# Patient Record
Sex: MALE | Race: ASIAN | NOT HISPANIC OR LATINO | URBAN - METROPOLITAN AREA
[De-identification: names, ages, dates, MRNs, and addresses within clinical notes are randomized per-mention and may not be internally consistent; named-entity substitution may affect disease eponyms.]

---

## 2020-10-05 ENCOUNTER — INPATIENT (INPATIENT)
Facility: HOSPITAL | Age: 55
LOS: 2 days | Discharge: HOME | End: 2020-10-08
Attending: HOSPITALIST | Admitting: HOSPITALIST
Payer: MEDICAID

## 2020-10-05 VITALS
HEART RATE: 121 BPM | OXYGEN SATURATION: 98 % | DIASTOLIC BLOOD PRESSURE: 112 MMHG | TEMPERATURE: 98 F | RESPIRATION RATE: 18 BRPM | SYSTOLIC BLOOD PRESSURE: 178 MMHG

## 2020-10-05 DIAGNOSIS — E78.5 HYPERLIPIDEMIA, UNSPECIFIED: ICD-10-CM

## 2020-10-05 DIAGNOSIS — Z95.818 PRESENCE OF OTHER CARDIAC IMPLANTS AND GRAFTS: Chronic | ICD-10-CM

## 2020-10-05 DIAGNOSIS — I10 ESSENTIAL (PRIMARY) HYPERTENSION: ICD-10-CM

## 2020-10-05 DIAGNOSIS — F10.239 ALCOHOL DEPENDENCE WITH WITHDRAWAL, UNSPECIFIED: ICD-10-CM

## 2020-10-05 DIAGNOSIS — F10.230 ALCOHOL DEPENDENCE WITH WITHDRAWAL, UNCOMPLICATED: ICD-10-CM

## 2020-10-05 DIAGNOSIS — Z29.9 ENCOUNTER FOR PROPHYLACTIC MEASURES, UNSPECIFIED: ICD-10-CM

## 2020-10-05 DIAGNOSIS — E10.65 TYPE 1 DIABETES MELLITUS WITH HYPERGLYCEMIA: ICD-10-CM

## 2020-10-05 LAB
ALBUMIN SERPL ELPH-MCNC: 4.7 G/DL — SIGNIFICANT CHANGE UP (ref 3.3–5)
ALP SERPL-CCNC: 62 U/L — SIGNIFICANT CHANGE UP (ref 40–120)
ALT FLD-CCNC: 28 U/L — SIGNIFICANT CHANGE UP (ref 4–41)
ANION GAP SERPL CALC-SCNC: 11 MMO/L — SIGNIFICANT CHANGE UP (ref 7–14)
ANION GAP SERPL CALC-SCNC: 15 MMO/L — HIGH (ref 7–14)
APPEARANCE UR: CLEAR — SIGNIFICANT CHANGE UP
AST SERPL-CCNC: 20 U/L — SIGNIFICANT CHANGE UP (ref 4–40)
B-OH-BUTYR SERPL-SCNC: 1.4 MMOL/L — HIGH (ref 0–0.4)
B-OH-BUTYR SERPL-SCNC: < 0.1 MMOL/L — SIGNIFICANT CHANGE UP (ref 0–0.4)
BACTERIA # UR AUTO: NEGATIVE — SIGNIFICANT CHANGE UP
BASE EXCESS BLDV CALC-SCNC: 2.6 MMOL/L — SIGNIFICANT CHANGE UP
BASOPHILS # BLD AUTO: 0.02 K/UL — SIGNIFICANT CHANGE UP (ref 0–0.2)
BASOPHILS NFR BLD AUTO: 0.2 % — SIGNIFICANT CHANGE UP (ref 0–2)
BILIRUB SERPL-MCNC: 0.5 MG/DL — SIGNIFICANT CHANGE UP (ref 0.2–1.2)
BILIRUB UR-MCNC: NEGATIVE — SIGNIFICANT CHANGE UP
BLOOD GAS VENOUS - CREATININE: 1.04 MG/DL — SIGNIFICANT CHANGE UP (ref 0.5–1.3)
BLOOD UR QL VISUAL: NEGATIVE — SIGNIFICANT CHANGE UP
BUN SERPL-MCNC: 19 MG/DL — SIGNIFICANT CHANGE UP (ref 7–23)
BUN SERPL-MCNC: 22 MG/DL — SIGNIFICANT CHANGE UP (ref 7–23)
CALCIUM SERPL-MCNC: 9 MG/DL — SIGNIFICANT CHANGE UP (ref 8.4–10.5)
CALCIUM SERPL-MCNC: 9.8 MG/DL — SIGNIFICANT CHANGE UP (ref 8.4–10.5)
CHLORIDE BLDV-SCNC: 99 MMOL/L — SIGNIFICANT CHANGE UP (ref 96–108)
CHLORIDE SERPL-SCNC: 92 MMOL/L — LOW (ref 98–107)
CHLORIDE SERPL-SCNC: 97 MMOL/L — LOW (ref 98–107)
CO2 SERPL-SCNC: 25 MMOL/L — SIGNIFICANT CHANGE UP (ref 22–31)
CO2 SERPL-SCNC: 27 MMOL/L — SIGNIFICANT CHANGE UP (ref 22–31)
COLOR SPEC: SIGNIFICANT CHANGE UP
CREAT SERPL-MCNC: 0.94 MG/DL — SIGNIFICANT CHANGE UP (ref 0.5–1.3)
CREAT SERPL-MCNC: 0.98 MG/DL — SIGNIFICANT CHANGE UP (ref 0.5–1.3)
EOSINOPHIL # BLD AUTO: 0.02 K/UL — SIGNIFICANT CHANGE UP (ref 0–0.5)
EOSINOPHIL NFR BLD AUTO: 0.2 % — SIGNIFICANT CHANGE UP (ref 0–6)
ETHANOL BLD-MCNC: < 10 MG/DL — SIGNIFICANT CHANGE UP
GAS PNL BLDV: 127 MMOL/L — LOW (ref 136–146)
GLUCOSE BLDC GLUCOMTR-MCNC: 125 MG/DL — HIGH (ref 70–99)
GLUCOSE BLDC GLUCOMTR-MCNC: 299 MG/DL — HIGH (ref 70–99)
GLUCOSE BLDC GLUCOMTR-MCNC: 317 MG/DL — HIGH (ref 70–99)
GLUCOSE BLDV-MCNC: 439 MG/DL — HIGH (ref 70–99)
GLUCOSE SERPL-MCNC: 369 MG/DL — HIGH (ref 70–99)
GLUCOSE SERPL-MCNC: 457 MG/DL — CRITICAL HIGH (ref 70–99)
GLUCOSE UR-MCNC: >1000 — HIGH
HCO3 BLDV-SCNC: 25 MMOL/L — SIGNIFICANT CHANGE UP (ref 20–27)
HCT VFR BLD CALC: 40.3 % — SIGNIFICANT CHANGE UP (ref 39–50)
HCT VFR BLDV CALC: 46.1 % — SIGNIFICANT CHANGE UP (ref 39–51)
HGB BLD-MCNC: 14.1 G/DL — SIGNIFICANT CHANGE UP (ref 13–17)
HGB BLDV-MCNC: 15.1 G/DL — SIGNIFICANT CHANGE UP (ref 13–17)
HYALINE CASTS # UR AUTO: NEGATIVE — SIGNIFICANT CHANGE UP
IMM GRANULOCYTES NFR BLD AUTO: 0.5 % — SIGNIFICANT CHANGE UP (ref 0–1.5)
KETONES UR-MCNC: HIGH
LACTATE BLDV-MCNC: 2.8 MMOL/L — HIGH (ref 0.5–2)
LEUKOCYTE ESTERASE UR-ACNC: NEGATIVE — SIGNIFICANT CHANGE UP
LYMPHOCYTES # BLD AUTO: 0.83 K/UL — LOW (ref 1–3.3)
LYMPHOCYTES # BLD AUTO: 8.9 % — LOW (ref 13–44)
MAGNESIUM SERPL-MCNC: 1.6 MG/DL — SIGNIFICANT CHANGE UP (ref 1.6–2.6)
MCHC RBC-ENTMCNC: 30.4 PG — SIGNIFICANT CHANGE UP (ref 27–34)
MCHC RBC-ENTMCNC: 35 % — SIGNIFICANT CHANGE UP (ref 32–36)
MCV RBC AUTO: 86.9 FL — SIGNIFICANT CHANGE UP (ref 80–100)
MONOCYTES # BLD AUTO: 0.47 K/UL — SIGNIFICANT CHANGE UP (ref 0–0.9)
MONOCYTES NFR BLD AUTO: 5 % — SIGNIFICANT CHANGE UP (ref 2–14)
NEUTROPHILS # BLD AUTO: 7.93 K/UL — HIGH (ref 1.8–7.4)
NEUTROPHILS NFR BLD AUTO: 85.2 % — HIGH (ref 43–77)
NITRITE UR-MCNC: NEGATIVE — SIGNIFICANT CHANGE UP
NRBC # FLD: 0 K/UL — SIGNIFICANT CHANGE UP (ref 0–0)
PCO2 BLDV: 51 MMHG — SIGNIFICANT CHANGE UP (ref 41–51)
PH BLDV: 7.36 PH — SIGNIFICANT CHANGE UP (ref 7.32–7.43)
PH UR: 6 — SIGNIFICANT CHANGE UP (ref 5–8)
PHOSPHATE SERPL-MCNC: 2.2 MG/DL — LOW (ref 2.5–4.5)
PHOSPHATE SERPL-MCNC: 3.8 MG/DL — SIGNIFICANT CHANGE UP (ref 2.5–4.5)
PLATELET # BLD AUTO: 174 K/UL — SIGNIFICANT CHANGE UP (ref 150–400)
PMV BLD: 8.9 FL — SIGNIFICANT CHANGE UP (ref 7–13)
PO2 BLDV: 32 MMHG — LOW (ref 35–40)
POTASSIUM BLDV-SCNC: 4.6 MMOL/L — HIGH (ref 3.4–4.5)
POTASSIUM SERPL-MCNC: 4.6 MMOL/L — SIGNIFICANT CHANGE UP (ref 3.5–5.3)
POTASSIUM SERPL-MCNC: 5 MMOL/L — SIGNIFICANT CHANGE UP (ref 3.5–5.3)
POTASSIUM SERPL-SCNC: 4.6 MMOL/L — SIGNIFICANT CHANGE UP (ref 3.5–5.3)
POTASSIUM SERPL-SCNC: 5 MMOL/L — SIGNIFICANT CHANGE UP (ref 3.5–5.3)
PROT SERPL-MCNC: 7.5 G/DL — SIGNIFICANT CHANGE UP (ref 6–8.3)
PROT UR-MCNC: 20 — SIGNIFICANT CHANGE UP
RBC # BLD: 4.64 M/UL — SIGNIFICANT CHANGE UP (ref 4.2–5.8)
RBC # FLD: 12 % — SIGNIFICANT CHANGE UP (ref 10.3–14.5)
RBC CASTS # UR COMP ASSIST: SIGNIFICANT CHANGE UP (ref 0–?)
SAO2 % BLDV: 55.4 % — LOW (ref 60–85)
SARS-COV-2 RNA SPEC QL NAA+PROBE: SIGNIFICANT CHANGE UP
SODIUM SERPL-SCNC: 132 MMOL/L — LOW (ref 135–145)
SODIUM SERPL-SCNC: 135 MMOL/L — SIGNIFICANT CHANGE UP (ref 135–145)
SP GR SPEC: 1.03 — SIGNIFICANT CHANGE UP (ref 1–1.04)
SQUAMOUS # UR AUTO: SIGNIFICANT CHANGE UP
TROPONIN T, HIGH SENSITIVITY: 16 NG/L — SIGNIFICANT CHANGE UP (ref ?–14)
UROBILINOGEN FLD QL: NORMAL — SIGNIFICANT CHANGE UP
WBC # BLD: 9.32 K/UL — SIGNIFICANT CHANGE UP (ref 3.8–10.5)
WBC # FLD AUTO: 9.32 K/UL — SIGNIFICANT CHANGE UP (ref 3.8–10.5)
WBC UR QL: SIGNIFICANT CHANGE UP (ref 0–?)

## 2020-10-05 PROCEDURE — 99285 EMERGENCY DEPT VISIT HI MDM: CPT

## 2020-10-05 PROCEDURE — 99223 1ST HOSP IP/OBS HIGH 75: CPT | Mod: GC

## 2020-10-05 PROCEDURE — 71045 X-RAY EXAM CHEST 1 VIEW: CPT | Mod: 26

## 2020-10-05 RX ORDER — ENOXAPARIN SODIUM 100 MG/ML
40 INJECTION SUBCUTANEOUS DAILY
Refills: 0 | Status: DISCONTINUED | OUTPATIENT
Start: 2020-10-05 | End: 2020-10-08

## 2020-10-05 RX ORDER — INSULIN LISPRO 100/ML
8 VIAL (ML) SUBCUTANEOUS ONCE
Refills: 0 | Status: COMPLETED | OUTPATIENT
Start: 2020-10-05 | End: 2020-10-05

## 2020-10-05 RX ORDER — INSULIN LISPRO 100/ML
VIAL (ML) SUBCUTANEOUS
Refills: 0 | Status: DISCONTINUED | OUTPATIENT
Start: 2020-10-05 | End: 2020-10-08

## 2020-10-05 RX ORDER — SODIUM,POTASSIUM PHOSPHATES 278-250MG
1 POWDER IN PACKET (EA) ORAL THREE TIMES A DAY
Refills: 0 | Status: COMPLETED | OUTPATIENT
Start: 2020-10-05 | End: 2020-10-06

## 2020-10-05 RX ORDER — INSULIN GLARGINE 100 [IU]/ML
24 INJECTION, SOLUTION SUBCUTANEOUS AT BEDTIME
Refills: 0 | Status: DISCONTINUED | OUTPATIENT
Start: 2020-10-06 | End: 2020-10-07

## 2020-10-05 RX ORDER — LOSARTAN POTASSIUM 100 MG/1
50 TABLET, FILM COATED ORAL DAILY
Refills: 0 | Status: DISCONTINUED | OUTPATIENT
Start: 2020-10-05 | End: 2020-10-08

## 2020-10-05 RX ORDER — INSULIN GLARGINE 100 [IU]/ML
24 INJECTION, SOLUTION SUBCUTANEOUS ONCE
Refills: 0 | Status: COMPLETED | OUTPATIENT
Start: 2020-10-05 | End: 2020-10-05

## 2020-10-05 RX ORDER — INSULIN LISPRO 100/ML
VIAL (ML) SUBCUTANEOUS AT BEDTIME
Refills: 0 | Status: DISCONTINUED | OUTPATIENT
Start: 2020-10-05 | End: 2020-10-08

## 2020-10-05 RX ORDER — DEXTROSE 50 % IN WATER 50 %
25 SYRINGE (ML) INTRAVENOUS ONCE
Refills: 0 | Status: DISCONTINUED | OUTPATIENT
Start: 2020-10-05 | End: 2020-10-08

## 2020-10-05 RX ORDER — GLUCAGON INJECTION, SOLUTION 0.5 MG/.1ML
1 INJECTION, SOLUTION SUBCUTANEOUS ONCE
Refills: 0 | Status: DISCONTINUED | OUTPATIENT
Start: 2020-10-05 | End: 2020-10-08

## 2020-10-05 RX ORDER — INSULIN GLARGINE 100 [IU]/ML
24 INJECTION, SOLUTION SUBCUTANEOUS AT BEDTIME
Refills: 0 | Status: DISCONTINUED | OUTPATIENT
Start: 2020-10-05 | End: 2020-10-05

## 2020-10-05 RX ORDER — DEXTROSE 50 % IN WATER 50 %
12.5 SYRINGE (ML) INTRAVENOUS ONCE
Refills: 0 | Status: DISCONTINUED | OUTPATIENT
Start: 2020-10-05 | End: 2020-10-08

## 2020-10-05 RX ORDER — INSULIN LISPRO 100/ML
4 VIAL (ML) SUBCUTANEOUS
Refills: 0 | Status: DISCONTINUED | OUTPATIENT
Start: 2020-10-05 | End: 2020-10-06

## 2020-10-05 RX ORDER — DEXTROSE 50 % IN WATER 50 %
15 SYRINGE (ML) INTRAVENOUS ONCE
Refills: 0 | Status: DISCONTINUED | OUTPATIENT
Start: 2020-10-05 | End: 2020-10-08

## 2020-10-05 RX ORDER — SODIUM CHLORIDE 9 MG/ML
2000 INJECTION INTRAMUSCULAR; INTRAVENOUS; SUBCUTANEOUS ONCE
Refills: 0 | Status: COMPLETED | OUTPATIENT
Start: 2020-10-05 | End: 2020-10-05

## 2020-10-05 RX ORDER — SODIUM CHLORIDE 9 MG/ML
1000 INJECTION, SOLUTION INTRAVENOUS
Refills: 0 | Status: DISCONTINUED | OUTPATIENT
Start: 2020-10-05 | End: 2020-10-08

## 2020-10-05 RX ORDER — FOLIC ACID 0.8 MG
1 TABLET ORAL DAILY
Refills: 0 | Status: DISCONTINUED | OUTPATIENT
Start: 2020-10-05 | End: 2020-10-08

## 2020-10-05 RX ORDER — AMLODIPINE BESYLATE 2.5 MG/1
5 TABLET ORAL DAILY
Refills: 0 | Status: DISCONTINUED | OUTPATIENT
Start: 2020-10-05 | End: 2020-10-08

## 2020-10-05 RX ORDER — THIAMINE MONONITRATE (VIT B1) 100 MG
100 TABLET ORAL DAILY
Refills: 0 | Status: COMPLETED | OUTPATIENT
Start: 2020-10-05 | End: 2020-10-08

## 2020-10-05 RX ORDER — THIAMINE MONONITRATE (VIT B1) 100 MG
500 TABLET ORAL ONCE
Refills: 0 | Status: COMPLETED | OUTPATIENT
Start: 2020-10-05 | End: 2020-10-05

## 2020-10-05 RX ORDER — ATORVASTATIN CALCIUM 80 MG/1
80 TABLET, FILM COATED ORAL AT BEDTIME
Refills: 0 | Status: DISCONTINUED | OUTPATIENT
Start: 2020-10-05 | End: 2020-10-08

## 2020-10-05 RX ORDER — INFLUENZA VIRUS VACCINE 15; 15; 15; 15 UG/.5ML; UG/.5ML; UG/.5ML; UG/.5ML
0.5 SUSPENSION INTRAMUSCULAR ONCE
Refills: 0 | Status: COMPLETED | OUTPATIENT
Start: 2020-10-05 | End: 2020-10-08

## 2020-10-05 RX ADMIN — SODIUM CHLORIDE 2000 MILLILITER(S): 9 INJECTION INTRAMUSCULAR; INTRAVENOUS; SUBCUTANEOUS at 13:17

## 2020-10-05 RX ADMIN — LOSARTAN POTASSIUM 50 MILLIGRAM(S): 100 TABLET, FILM COATED ORAL at 18:11

## 2020-10-05 RX ADMIN — Medication 1 TABLET(S): at 16:01

## 2020-10-05 RX ADMIN — Medication 50 MILLIGRAM(S): at 21:37

## 2020-10-05 RX ADMIN — INSULIN GLARGINE 24 UNIT(S): 100 INJECTION, SOLUTION SUBCUTANEOUS at 14:45

## 2020-10-05 RX ADMIN — Medication 1 MILLIGRAM(S): at 16:00

## 2020-10-05 RX ADMIN — Medication 1 PACKET(S): at 21:37

## 2020-10-05 RX ADMIN — Medication 8 UNIT(S): at 13:17

## 2020-10-05 RX ADMIN — Medication 6: at 18:31

## 2020-10-05 RX ADMIN — Medication 105 MILLIGRAM(S): at 14:45

## 2020-10-05 RX ADMIN — Medication 4 UNIT(S): at 18:31

## 2020-10-05 RX ADMIN — ATORVASTATIN CALCIUM 80 MILLIGRAM(S): 80 TABLET, FILM COATED ORAL at 21:37

## 2020-10-05 RX ADMIN — Medication 2 MILLIGRAM(S): at 11:50

## 2020-10-05 RX ADMIN — Medication 50 MILLIGRAM(S): at 16:00

## 2020-10-05 RX ADMIN — AMLODIPINE BESYLATE 5 MILLIGRAM(S): 2.5 TABLET ORAL at 19:36

## 2020-10-05 RX ADMIN — SODIUM CHLORIDE 2000 MILLILITER(S): 9 INJECTION INTRAMUSCULAR; INTRAVENOUS; SUBCUTANEOUS at 11:58

## 2020-10-05 NOTE — ED ADULT NURSE NOTE - OBJECTIVE STATEMENT
56 y/o m presents to the ED with lightheadedness. Patient states has h/o regular etoh use with previous admissions after stopping alcohol at o[facility], also with loop recorder for syncope, IDDM  htn-last etoh drink yesterday. This morning was driving around and began to feel lightheaded, improved significantly after an hour 54 y/o m presents to the ED with lightheadedness. Patient states has h/o regular etoh use with previous admissions after stopping alcohol at osh, also with loop recorder for syncope, IDDM  htn-last etoh drink yesterday. This morning was driving around and began to feel lightheaded, improved significantly after an hour  PIV Placed labs drawn as ordered Pt medicated with ativan see CIWA score

## 2020-10-05 NOTE — H&P ADULT - HISTORY OF PRESENT ILLNESS
Pt is a 56 yo M with a hx of alcohol use, HTN, uncontrolled DM, HLD presenting with lightheadedness and visual disturbances. Pt was hospitalized mid August 2020 at [facility] for alcohol withdrawal c/b seizure. Pt states that he typically drinks 1 shot/day but this weekend drank a bottle of vodka. Last drink last night 7pm. This morning was driving and reports feeling dizzy, having blurry vision and seeing a dark spot in his vision. Also reports mild nausea this am that has resolved. Denies vomiting, h/a, CP, SOB, abd pain, cough.    In ED: afebrile, -120s, -190/100, RR 14-20, % RA.  blood glucose 453,

## 2020-10-05 NOTE — ED PROVIDER NOTE - CLINICAL SUMMARY MEDICAL DECISION MAKING FREE TEXT BOX
Patient presents to the ED with lightheadedness since this am now improved, h/o syncope with loop recorder, dm, htn, etoh use with admissions for withdrawal. Patient tachycardic, hypertensive, +tongue fasciculation, dry mm, elevated fsg. Will check labs, ekg, cxr, hydrate to start treating volume depletion and hyperglycemia, ciwa protocol and ativan prn for likely etoh withdrawal, eval for diff including but not limited to elec disturbances, organ dysfunction, monitor on tele for arrythmia, and reass.

## 2020-10-05 NOTE — H&P ADULT - ASSESSMENT
Pt is a 56 yo M with a hx of alcohol use, HTN, uncontrolled DM, HLD presenting with lightheadedness, visual disturbances and nausea. Pt has hx of hospitalization for alcohol withdrawal c/b seizure in August. Last drink last night 7pm. Pt has tachycardia to 120, HTN to 190/100. Blood glucose 453, beta hydroxybutyrate 1.4.      Pt is a 56 yo M with a hx of alcohol use, HTN, uncontrolled DM, HLD presenting with lightheadedness, visual disturbances and nausea. Pt has hx of hospitalization for alcohol withdrawal c/b seizure in August. Last drink last night 7pm. Pt has tachycardia to 120, HTN to 190/100. Blood glucose 453, beta hydroxybutyrate 1.4. HbA1c 10.7.

## 2020-10-05 NOTE — H&P ADULT - NSHPSOCIALHISTORY_GEN_ALL_CORE
Pt lives at home with 23 year old son and brother in law. Wife recently passed away at [facility] due to COVID. Pt states that he began drinking heavily after her passing in April. He works as a  for a sheet [employer] company.  Pt denies hx of STDs. Denies tobacco use.

## 2020-10-05 NOTE — H&P ADULT - PROBLEM SELECTOR PLAN 1
-Librium taper w Ativan PRN  -s/p high dose thiamine in ED, c/w oral thiamine  -f/u EtOH level  -f/u repeat BMP  -c/w home folic acid 1mg  -multivitamin

## 2020-10-05 NOTE — H&P ADULT - ATTENDING COMMENTS
Briefly a 55M with ETOH abuse with recent hospitalization for ETOH withdrawal in Aug 2020, syncope in past now s/p ILR, HLD, DM2 on Lantus, HTN, presents with dizziness/lightheadedness and tremors concerning for ETOH withdrawal.  Pt also found to have mild DKA with hyperglycemia + urinary ketones and mildly elevated BHB in setting of missed doses of insulin.      #ETOH withdrawal: continue CIWA monitoring (score 0 currently), agree with librium taper with PRN Ativan.  SW/SBIRT evaluation for ETOH counseling programs.  Resume MVI/Thiamine/Folate.  Pt's wife recently passed away from COVID likely exacerbating ETOH use disorder.    Consider EP evaluation for ILR readings if dizziness persists.     #Insulin dependent DM2: missed doses of insulin.  Received Lantus 24U in ER and now on HISS. S/p 2L NS bolus, check repeat BMP now with BHB to ensure anion gap is closing.  Resume basal/bolus regimen

## 2020-10-05 NOTE — ED PROVIDER NOTE - NS ED ROS FT
ROS:  GENERAL: No fever, no chills  EYES: no change in vision  HEENT: no trouble swallowing, no trouble speaking  CARDIAC: no chest pain  PULMONARY: no cough, no shortness of breath  GI: no abdominal pain, no constipation  : No dysuria, no frequency, no change in appearance, or odor of urine  SKIN: no rashes  NEURO: no headache, no weakness, +lightheaded  MSK: No joint pain

## 2020-10-05 NOTE — ED PROVIDER NOTE - PHYSICAL EXAMINATION
GEN - NAD; nontoxic appearing; A+O x3   HEAD - NC/AT   EYES- PERRL, EOMI  ENT: Airway patent, dry mm, +tongue fasciculations, Oral cavity and pharynx normal.   NECK: Neck supple, no masses.  PULMONARY - CTA b/l, symmetric breath sounds.   CARDIAC -s1s2, RRR, no M,G,R  ABDOMEN - +BS, ND, NT, soft, no guarding, no rebound, no masses   BACK - no CVA tenderness, Normal  spine   EXTREMITIES - FROM, no edema   SKIN - no rash or bruising   NEUROLOGIC - ao3, cn2-12 intact, 5/5 strength in all extremities, sensation grossly intact  PSYCH -calm, cooperative

## 2020-10-05 NOTE — ED ADULT TRIAGE NOTE - CHIEF COMPLAINT QUOTE
p/t with ETOH, c/o of feeling dizzy since this am, mild tremors to extremities noted, last alcohol intake yesterday, fs [id number]

## 2020-10-05 NOTE — ED PROVIDER NOTE - OBJECTIVE STATEMENT
54 y/o m presents to the ED with lightheadedness. Patient states states has h/o regular etoh use with previous admissions after stopping alcohol at osh, also with loop recorder for syncope, IDDM (24U Lanus qhs), htn-last etoh drink yesterday. This morning was driving around and began to feel lightheaded, improved significantly after an hour but has not completely resolved, does have mild nausea associated. No ha, new vision changes (has baseline r. visual field blindness), cp, sob, cough, vomiting, abd pain, diarrhea, dysuria. Denies h/o withdrawal seizures. 56 y/o m presents to the ED with lightheadedness. Patient states has h/o regular etoh use with previous admissions after stopping alcohol at osh, also with loop recorder for syncope, IDDM (24U Lanus qhs), htn-last etoh drink yesterday. This morning was driving around and began to feel lightheaded, improved significantly after an hour but has not completely resolved, does have mild nausea associated. No ha, new vision changes (has baseline r. visual field blindness), cp, sob, cough, vomiting, abd pain, diarrhea, dysuria. Denies h/o withdrawal seizures.

## 2020-10-05 NOTE — H&P ADULT - NSHPPHYSICALEXAM_GEN_ALL_CORE
Vital Signs Last 24 Hrs  T(C): 36.8 (05 Oct 2020 15:58), Max: 37 (05 Oct 2020 11:45)  T(F): 98.2 (05 Oct 2020 15:58), Max: 98.6 (05 Oct 2020 11:45)  HR: 112 (05 Oct 2020 15:58) (111 - 121)  BP: 156/92 (05 Oct 2020 15:58) (156/92 - 190/100)  BP(mean): --  RR: 18 (05 Oct 2020 15:58) (14 - 20)  SpO2: 100% (05 Oct 2020 15:58) (98% - 100%)      GENERAL: NAD, lying in bed comfortably  HEAD:  Atraumatic, Normocephalic  EYES: EOMI, PERRLA, conjunctiva and sclera clear  ENT: Moist mucous membranes  NECK: Supple, No JVD  CHEST/LUNG: Clear to auscultation bilaterally; No rales, rhonchi, wheezing, or rubs. Unlabored respirations  HEART: +tachycardic, reg rhythm; No murmurs, rubs, or gallops  ABDOMEN: Bowel sounds present; NTND  EXTREMITIES:  2+ Peripheral Pulses, brisk capillary refill. No clubbing, cyanosis, or edema  NERVOUS SYSTEM:  Alert & Oriented X3, speech clear. No deficits. +1 tremulousness   MSK: FROM all 4 extremities, full and equal strength  SKIN: No rashes or lesions

## 2020-10-06 ENCOUNTER — TRANSCRIPTION ENCOUNTER (OUTPATIENT)
Age: 55
End: 2020-10-06

## 2020-10-06 LAB
ALBUMIN SERPL ELPH-MCNC: 3.9 G/DL — SIGNIFICANT CHANGE UP (ref 3.3–5)
ALP SERPL-CCNC: 49 U/L — SIGNIFICANT CHANGE UP (ref 40–120)
ALT FLD-CCNC: 21 U/L — SIGNIFICANT CHANGE UP (ref 4–41)
ANION GAP SERPL CALC-SCNC: 13 MMO/L — SIGNIFICANT CHANGE UP (ref 7–14)
AST SERPL-CCNC: 18 U/L — SIGNIFICANT CHANGE UP (ref 4–40)
BILIRUB SERPL-MCNC: 0.5 MG/DL — SIGNIFICANT CHANGE UP (ref 0.2–1.2)
BUN SERPL-MCNC: 18 MG/DL — SIGNIFICANT CHANGE UP (ref 7–23)
CALCIUM SERPL-MCNC: 8.7 MG/DL — SIGNIFICANT CHANGE UP (ref 8.4–10.5)
CHLORIDE SERPL-SCNC: 99 MMOL/L — SIGNIFICANT CHANGE UP (ref 98–107)
CO2 SERPL-SCNC: 26 MMOL/L — SIGNIFICANT CHANGE UP (ref 22–31)
CREAT SERPL-MCNC: 0.9 MG/DL — SIGNIFICANT CHANGE UP (ref 0.5–1.3)
CULTURE RESULTS: SIGNIFICANT CHANGE UP
GLUCOSE BLDC GLUCOMTR-MCNC: 112 MG/DL — HIGH (ref 70–99)
GLUCOSE SERPL-MCNC: 98 MG/DL — SIGNIFICANT CHANGE UP (ref 70–99)
HCT VFR BLD CALC: 38.9 % — LOW (ref 39–50)
HGB BLD-MCNC: 12.8 G/DL — LOW (ref 13–17)
MAGNESIUM SERPL-MCNC: 1.7 MG/DL — SIGNIFICANT CHANGE UP (ref 1.6–2.6)
MCHC RBC-ENTMCNC: 29.6 PG — SIGNIFICANT CHANGE UP (ref 27–34)
MCHC RBC-ENTMCNC: 32.9 % — SIGNIFICANT CHANGE UP (ref 32–36)
MCV RBC AUTO: 89.8 FL — SIGNIFICANT CHANGE UP (ref 80–100)
NRBC # FLD: 0 K/UL — SIGNIFICANT CHANGE UP (ref 0–0)
PHOSPHATE SERPL-MCNC: 3.7 MG/DL — SIGNIFICANT CHANGE UP (ref 2.5–4.5)
PLATELET # BLD AUTO: 158 K/UL — SIGNIFICANT CHANGE UP (ref 150–400)
PMV BLD: 9.2 FL — SIGNIFICANT CHANGE UP (ref 7–13)
POTASSIUM SERPL-MCNC: 3.5 MMOL/L — SIGNIFICANT CHANGE UP (ref 3.5–5.3)
POTASSIUM SERPL-SCNC: 3.5 MMOL/L — SIGNIFICANT CHANGE UP (ref 3.5–5.3)
PROT SERPL-MCNC: 6 G/DL — SIGNIFICANT CHANGE UP (ref 6–8.3)
RBC # BLD: 4.33 M/UL — SIGNIFICANT CHANGE UP (ref 4.2–5.8)
RBC # FLD: 12 % — SIGNIFICANT CHANGE UP (ref 10.3–14.5)
SODIUM SERPL-SCNC: 138 MMOL/L — SIGNIFICANT CHANGE UP (ref 135–145)
SPECIMEN SOURCE: SIGNIFICANT CHANGE UP
WBC # BLD: 4.76 K/UL — SIGNIFICANT CHANGE UP (ref 3.8–10.5)
WBC # FLD AUTO: 4.76 K/UL — SIGNIFICANT CHANGE UP (ref 3.8–10.5)

## 2020-10-06 PROCEDURE — 99232 SBSQ HOSP IP/OBS MODERATE 35: CPT | Mod: GC

## 2020-10-06 RX ORDER — INSULIN LISPRO 100/ML
8 VIAL (ML) SUBCUTANEOUS
Refills: 0 | Status: DISCONTINUED | OUTPATIENT
Start: 2020-10-06 | End: 2020-10-07

## 2020-10-06 RX ADMIN — ENOXAPARIN SODIUM 40 MILLIGRAM(S): 100 INJECTION SUBCUTANEOUS at 11:46

## 2020-10-06 RX ADMIN — Medication 1 PACKET(S): at 05:35

## 2020-10-06 RX ADMIN — Medication 1 PACKET(S): at 14:51

## 2020-10-06 RX ADMIN — AMLODIPINE BESYLATE 5 MILLIGRAM(S): 2.5 TABLET ORAL at 05:35

## 2020-10-06 RX ADMIN — Medication 4 UNIT(S): at 17:35

## 2020-10-06 RX ADMIN — Medication 100 MILLIGRAM(S): at 11:46

## 2020-10-06 RX ADMIN — Medication 6: at 17:35

## 2020-10-06 RX ADMIN — INSULIN GLARGINE 24 UNIT(S): 100 INJECTION, SOLUTION SUBCUTANEOUS at 21:50

## 2020-10-06 RX ADMIN — Medication 50 MILLIGRAM(S): at 03:48

## 2020-10-06 RX ADMIN — Medication 4 UNIT(S): at 11:48

## 2020-10-06 RX ADMIN — Medication 4 UNIT(S): at 08:50

## 2020-10-06 RX ADMIN — Medication 4: at 21:50

## 2020-10-06 RX ADMIN — Medication 1 TABLET(S): at 11:46

## 2020-10-06 RX ADMIN — LOSARTAN POTASSIUM 50 MILLIGRAM(S): 100 TABLET, FILM COATED ORAL at 05:35

## 2020-10-06 RX ADMIN — ATORVASTATIN CALCIUM 80 MILLIGRAM(S): 80 TABLET, FILM COATED ORAL at 21:50

## 2020-10-06 RX ADMIN — Medication 6: at 11:48

## 2020-10-06 RX ADMIN — Medication 1 MILLIGRAM(S): at 11:46

## 2020-10-06 RX ADMIN — Medication 50 MILLIGRAM(S): at 14:51

## 2020-10-06 NOTE — PROGRESS NOTE ADULT - SUBJECTIVE AND OBJECTIVE BOX
RATNA SOSASH  55y  Male      Patient is a 55y old Male who presents with a chief complaint of Lightheadedness (05 Oct 2020 16:37)      INTERVAL HPI/OVERNIGHT EVENTS: Pt seen at bedside. HEBER overnight. Pt states that he feels "much better than yesterday".  Reports some dizziness on standing when he gets up to use the bathroom. Denies blurry vision, tremulousness, insomnia, anxiety. Reports good appetite, good sleep.       REVIEW OF SYSTEMS:  CONSTITUTIONAL: No fever, weight loss, or fatigue  EYES: No eye pain, visual disturbances, or discharge  ENMT:  No difficulty hearing, tinnitus, vertigo; No sinus or throat pain  NECK: No pain or stiffness  BREASTS: No pain, masses, or nipple discharge  RESPIRATORY: No cough, wheezing, chills or hemoptysis; No shortness of breath  CARDIOVASCULAR: No chest pain, palpitations, dizziness, or leg swelling  GASTROINTESTINAL: No abdominal or epigastric pain. No nausea, vomiting, or hematemesis; No diarrhea or constipation. No melena or hematochezia.  GENITOURINARY: No dysuria, frequency, hematuria, or incontinence  NEUROLOGICAL: +headaches, +dizziness on standing, memory loss, loss of strength, numbness, or tremors  SKIN: No itching, burning, rashes, or lesions   LYMPH NODES: No enlarged glands  ENDOCRINE: No heat or cold intolerance; No hair loss  MUSCULOSKELETAL: No joint pain or swelling; No muscle, back, or extremity pain  PSYCHIATRIC: No depression, anxiety, mood swings, or difficulty sleeping  HEME/LYMPH: No easy bruising, or bleeding gums  ALLERY AND IMMUNOLOGIC: No hives or eczema  FAMILY HISTORY:    T(C): 36.4 (10-06-20 @ 05:34), Max: 37 (10-05-20 @ 11:45)  HR: 69 (10-06-20 @ 05:34) (69 - 121)  BP: 128/88 (10-06-20 @ 05:34) (116/83 - 190/100)  RR: 17 (10-06-20 @ 05:34) (14 - 20)  SpO2: 100% (10-06-20 @ 05:34) (98% - 100%)      PHYSICAL EXAM:  GENERAL: NAD, well-groomed, well-developed  HEAD:  Atraumatic, Normocephalic  EYES: EOMI, PERRLA, conjunctiva and sclera clear  ENMT: No tonsillar erythema, exudates, or enlargement; Moist mucous membranes, Good dentition, No lesions  NECK: Supple, No JVD, Normal thyroid  NERVOUS SYSTEM:  Alert & Oriented X3, Good concentration; Motor Strength 5/5 B/L upper and lower extremities; DTRs 2+ intact and symmetric  CHEST/LUNG: Clear to percussion bilaterally; No rales, rhonchi, wheezing, or rubs  HEART: Regular rate and rhythm; No murmurs, rubs, or gallops  ABDOMEN: Soft, Nontender, Nondistended; Bowel sounds present  EXTREMITIES:  2+ Peripheral Pulses, No clubbing, cyanosis, or edema  LYMPH: No lymphadenopathy noted  SKIN: No rashes or lesions    Consultant(s) Notes Reviewed:  [x ] YES  [ ] NO  Care Discussed with Consultants/Other Providers [ x] YES  [ ] NO    LABS:                        12.8   4.76  )-----------( 158      ( 06 Oct 2020 05:40 )             38.9     10-    138  |  99  |  18  ----------------------------<  98  3.5   |  26  |  0.90    Ca    8.7      06 Oct 2020 05:40  Phos  3.7     10-06  Mg     1.7     10-06    TPro  6.0  /  Alb  3.9  /  TBili  0.5  /  DBili  x   /  AST  18  /  ALT  21  /  AlkPhos  49  10-06      Urinalysis Basic - ( 05 Oct 2020 13:00 )    Color: LIGHT YELLOW / Appearance: CLEAR / S.031 / pH: 6.0  Gluc: >1000 / Ketone: MODERATE  / Bili: NEGATIVE / Urobili: NORMAL   Blood: NEGATIVE / Protein: 20 / Nitrite: NEGATIVE   Leuk Esterase: NEGATIVE / RBC: 0-2 / WBC 0-2   Sq Epi: OCC / Non Sq Epi: x / Bacteria: NEGATIVE      55y  Male    RADIOLOGY & ADDITIONAL TESTS:    Imaging Personally Reviewed:  [ ] YES  [ ] NO  amLODIPine   Tablet 5 milliGRAM(s) Oral daily  atorvastatin 80 milliGRAM(s) Oral at bedtime  chlordiazePOXIDE   Oral   chlordiazePOXIDE 50 milliGRAM(s) Oral every 8 hours  dextrose 40% Gel 15 Gram(s) Oral once PRN  dextrose 5%. 1000 milliLiter(s) IV Continuous <Continuous>  dextrose 50% Injectable 12.5 Gram(s) IV Push once  dextrose 50% Injectable 25 Gram(s) IV Push once  dextrose 50% Injectable 25 Gram(s) IV Push once  enoxaparin Injectable 40 milliGRAM(s) SubCutaneous daily  folic acid 1 milliGRAM(s) Oral daily  glucagon  Injectable 1 milliGRAM(s) IntraMuscular once PRN  influenza   Vaccine 0.5 milliLiter(s) IntraMuscular once  insulin glargine Injectable (LANTUS) 24 Unit(s) SubCutaneous at bedtime  insulin lispro (HumaLOG) corrective regimen sliding scale   SubCutaneous three times a day before meals  insulin lispro (HumaLOG) corrective regimen sliding scale   SubCutaneous at bedtime  insulin lispro Injectable (HumaLOG) 4 Unit(s) SubCutaneous three times a day before meals  LORazepam   Injectable 2 milliGRAM(s) IV Push every 2 hours PRN  LORazepam   Injectable 2 milliGRAM(s) IV Push every 1 hour PRN  losartan 50 milliGRAM(s) Oral daily  multivitamin 1 Tablet(s) Oral daily  potassium phosphate / sodium phosphate Powder (PHOS-NaK) 1 Packet(s) Oral three times a day  thiamine 100 milliGRAM(s) Oral daily

## 2020-10-06 NOTE — DISCHARGE NOTE PROVIDER - NSDCCPCAREPLAN_GEN_ALL_CORE_FT
PRINCIPAL DISCHARGE DIAGNOSIS  Diagnosis: Alcohol withdrawal  Assessment and Plan of Treatment:       SECONDARY DISCHARGE DIAGNOSES  Diagnosis: Essential hypertension  Assessment and Plan of Treatment: Essential hypertension    Diagnosis: Hyperglycemia due to type 1 diabetes mellitus  Assessment and Plan of Treatment: Hyperglycemia due to type 1 diabetes mellitus     PRINCIPAL DISCHARGE DIAGNOSIS  Diagnosis: Alcohol withdrawal  Assessment and Plan of Treatment: You came into the hospital with dizziness, blurry vision, shakey hands, unsteadiness walking and nausea.  These symptoms are due to alcohol withdrawal. In the hospital you were treated with librium, a medication that can treat the symptoms of alcohol withdrawal. You were kept in the hospital to monitor you as alcohol withdrawal can also cause seizures.      SECONDARY DISCHARGE DIAGNOSES  Diagnosis: Hyperglycemia due to type 1 diabetes mellitus  Assessment and Plan of Treatment: When you came to the hospital, your blood sugar was high, to 453. Your hemoglobin A1c was also high, 10.7, which shows that your blood sugar is not being controlled well. The goal hemoglobin A1c is less than 7. It is important to take your insulin as prescribed, check your blood sugar regularly and eat healthily.    Diagnosis: Essential hypertension  Assessment and Plan of Treatment: When you came to the hospital, your blood pressure was high, up to 190/100.  It is important for you to take your blood pressure medication (atenolol) every day as prescribed.     PRINCIPAL DISCHARGE DIAGNOSIS  Diagnosis: Alcohol withdrawal  Assessment and Plan of Treatment: You came into the hospital with dizziness, blurry vision, shakey hands, unsteadiness walking and nausea.  These symptoms are due to alcohol withdrawal. In the hospital you were treated with librium, a medication that can treat the symptoms of alcohol withdrawal. You were kept in the hospital to monitor you as alcohol withdrawal can also cause seizures.      SECONDARY DISCHARGE DIAGNOSES  Diagnosis: Hyperglycemia due to type 1 diabetes mellitus  Assessment and Plan of Treatment: When you came to the hospital, your blood sugar was high, to 453. Your hemoglobin A1c was also high, 10.7, which shows that your blood sugar is not being controlled well. The goal hemoglobin A1c is less than 7. It is important to take your insulin as prescribed, check your blood sugar regularly and eat healthily.    Diagnosis: Essential hypertension  Assessment and Plan of Treatment: When you came to the hospital, your blood pressure was high, up to 190/100.  It is important for you to take your blood pressure medication every day as prescribed.

## 2020-10-06 NOTE — DISCHARGE NOTE PROVIDER - NSDCMRMEDTOKEN_GEN_ALL_CORE_FT
amLODIPine 5 mg oral tablet: 1 tab(s) orally once a day  atorvastatin 80 mg oral tablet: 1 tab(s) orally once a day  Basaglar KwikPen 100 units/mL subcutaneous solution: 24 unit(s) subcutaneous once a day (at bedtime)  folic acid 1 mg oral tablet: 1 tab(s) orally once a day  Januvia 100 mg oral tablet: 1 tab(s) orally once a day  losartan 50 mg oral tablet: 1 tab(s) orally once a day  metFORMIN 1000 mg oral tablet: 1 tab(s) orally 2 times a day   amLODIPine 5 mg oral tablet: 1 tab(s) orally once a day  atorvastatin 80 mg oral tablet: 1 tab(s) orally once a day  Basaglar KwikPen 100 units/mL subcutaneous solution: 24 unit(s) subcutaneous once a day (at bedtime)  folic acid 1 mg oral tablet: 1 tab(s) orally once a day  losartan 50 mg oral tablet: 1 tab(s) orally once a day   amLODIPine 5 mg oral tablet: 1 tab(s) orally once a day  atorvastatin 80 mg oral tablet: 1 tab(s) orally once a day  Basaglar KwikPen 100 units/mL subcutaneous solution: 30 unit(s) subcutaneous once a day (at bedtime)   folic acid 1 mg oral tablet: 1 tab(s) orally once a day  HumaLOG KwikPen 100 units/mL injectable solution: 11 unit(s) injectable 3 times a day   losartan 50 mg oral tablet: 1 tab(s) orally once a day   alcohol swabs : Apply topically to affected area 4 times a day   amLODIPine 5 mg oral tablet: 1 tab(s) orally once a day  atorvastatin 80 mg oral tablet: 1 tab(s) orally once a day  Basaglar KwikPen 100 units/mL subcutaneous solution: 30 unit(s) subcutaneous once a day (at bedtime)   folic acid 1 mg oral tablet: 1 tab(s) orally once a day  HumaLOG KwikPen 100 units/mL injectable solution: 11 unit(s) injectable 3 times a day   Insulin Pen Needles, 4mm: 1 application subcutaneously 4 times a day. ** Use with insulin pen **   lancets: 1 application subcutaneously 4 times a day   losartan 50 mg oral tablet: 1 tab(s) orally once a day

## 2020-10-06 NOTE — PROGRESS NOTE ADULT - PROBLEM SELECTOR PLAN 1
-Librium taper w Ativan PRN  -s/p high dose thiamine in ED, c/w oral thiamine  -EtOH level <10  -repeat beta <0.1  -c/w home folic acid 1mg  -multivitamin

## 2020-10-06 NOTE — DISCHARGE NOTE PROVIDER - CARE PROVIDER_API CALL
[facility]  INTERNAL MEDICINE  [address] [place] [address], [place] [address]  Phone: [phone]  Fax: [phone]  Established Patient  Follow Up Time: 1 month   Charlene Francis  INTERNAL MEDICINE  [address], [address]  [place] [address]  Phone: [phone]  Fax: [phone]  Established Patient  Follow Up Time: 1 month    Anel Diaz)  EndocrinologyMetabDiabetes; Internal Medicine  [address]  [place] [address]  Phone: [phone]  Fax: [phone]  Follow Up Time:

## 2020-10-06 NOTE — PROGRESS NOTE ADULT - ASSESSMENT
Pt is a 56 yo M with a hx of alcohol use, HTN, uncontrolled DM, HLD presenting with lightheadedness, visual disturbances and nausea. Pt has hx of hospitalization for alcohol withdrawal c/b seizure in August. Last drink 10/4 7pm. In ED, had tachycardia to 120, HTN to 190/100. Found to be in mild DKA with blood glucose 453, beta hydroxybutyrate 1.4, ketones in urine due to recent missed insulin dose. HbA1c 10.7.

## 2020-10-06 NOTE — DISCHARGE NOTE PROVIDER - PROVIDER TOKENS
PROVIDER:[[id number]],FOLLOWUP:[1 month],ESTABLISHEDPATIENT:[T]] PROVIDER:[TOKEN:[[id number]],FOLLOWUP:[1 month],ESTABLISHEDPATIENT:[T]],PROVIDER:[TOKEN:[[id number]]]

## 2020-10-06 NOTE — DISCHARGE NOTE PROVIDER - HOSPITAL COURSE
Pt is a 56 yo M with a hx of alcohol use, HTN, uncontrolled DM, HLD presented with lightheadedness, visual disturbances and nausea. Pt has history of hospitalization for alcohol withdrawal in August. In the ED pt has tachycardia to 120, HTN to 190/100. Blood glucose 453, beta hydroxybutyrate 1.4. HbA1c 10.7.   Mr. Boris Hernandez is a 56 yo M with a hx of alcohol use, HTN, uncontrolled DM, HLD presented with lightheadedness, visual disturbances and nausea. Pt has history of hospitalization for alcohol withdrawal in August. In the ED pt was found to have tachycardia to 120, HTN to 190/100. Blood glucose 453, beta hydroxybutyrate 1.4. HbA1c 10.7.    Pt was treated with a librium taper    Mr. Boris Hernandez is a 54 yo M with a hx of alcohol use, HTN, uncontrolled DM, HLD  who presented with lightheadedness, visual disturbances and nausea. Pt has history of hospitalization for alcohol withdrawal in August. In the ED pt was found to have tachycardia to 120, HTN to 190/100. Blood glucose 453, beta hydroxybutyrate 1.4. HbA1c 10.7, CIWA 2.  Pt was treated with a librium taper  with resolution of his  withdrawal symptoms. Blood glucose was persistently elevated, endocrine consulted to adjust his insulin regimen.    Mr. Boris Hernandez is a 54 yo M with a hx of alcohol use, HTN, uncontrolled DM, HLD  who presented with lightheadedness, visual disturbances and nausea. Pt has history of hospitalization for alcohol withdrawal in August. In the ED pt was found to have tachycardia to 120, HTN to 190/100. Blood glucose 453, beta hydroxybutyrate 1.4. HbA1c 10.7, CIWA 2.  Pt was treated with a librium taper  with resolution of his  withdrawal symptoms. Blood glucose was persistently elevated, endocrine consulted to adjust his insulin regimen. Discharged on 30U of Basaglar QHS and 11U of Humalog premeals three times per day also sent additional diabetic supplies. Patient walking well, spoke with PCP and discussed patient has had a history of SDH in the past no indication for CTH inpatient as patient has no focal neurologic deficits despite initially feeling unsteady on his feet. Patient is hemodynamically stable and medically optimized for a safe discharge.    Consults:  - Endo  - SBIRT    Mr. Boris Hernandez is a 54 yo M with a hx of alcohol use, HTN, uncontrolled DM, HLD  who presented with lightheadedness, visual disturbances and nausea. Pt has history of hospitalization for alcohol withdrawal in August. In the ED pt was found to have tachycardia to 120, HTN to 190/100. Blood glucose 453, beta hydroxybutyrate 1.4. HbA1c 10.7, CIWA 2.  Pt was treated with a librium taper  with resolution of his  withdrawal symptoms. Blood glucose was persistently elevated, endocrine consulted to adjust his insulin regimen. Discharged on 30U of Basaglar QHS and 11U of Humalog premeals three times per day also sent additional diabetic supplies. Patient walking well, spoke with PCP and discussed patient has had a history of SDH in the past no indication for CTH inpatient as patient has no focal neurologic deficits despite initially feeling unsteady on his feet. Patient is hemodynamically stable and medically optimized for a safe discharge. Endocrine office will call patient to set up an appointment.     Consults:  - Endo  - SBIRT

## 2020-10-07 DIAGNOSIS — E11.10 TYPE 2 DIABETES MELLITUS WITH KETOACIDOSIS WITHOUT COMA: ICD-10-CM

## 2020-10-07 DIAGNOSIS — E78.49 OTHER HYPERLIPIDEMIA: ICD-10-CM

## 2020-10-07 PROCEDURE — 99233 SBSQ HOSP IP/OBS HIGH 50: CPT | Mod: GC

## 2020-10-07 PROCEDURE — 99223 1ST HOSP IP/OBS HIGH 75: CPT | Mod: GC

## 2020-10-07 RX ORDER — INSULIN GLARGINE 100 [IU]/ML
28 INJECTION, SOLUTION SUBCUTANEOUS AT BEDTIME
Refills: 0 | Status: DISCONTINUED | OUTPATIENT
Start: 2020-10-07 | End: 2020-10-07

## 2020-10-07 RX ORDER — INSULIN LISPRO 100/ML
11 VIAL (ML) SUBCUTANEOUS
Refills: 0 | Status: DISCONTINUED | OUTPATIENT
Start: 2020-10-07 | End: 2020-10-08

## 2020-10-07 RX ORDER — INSULIN GLARGINE 100 [IU]/ML
30 INJECTION, SOLUTION SUBCUTANEOUS AT BEDTIME
Refills: 0 | Status: DISCONTINUED | OUTPATIENT
Start: 2020-10-07 | End: 2020-10-08

## 2020-10-07 RX ADMIN — AMLODIPINE BESYLATE 5 MILLIGRAM(S): 2.5 TABLET ORAL at 05:12

## 2020-10-07 RX ADMIN — Medication 50 MILLIGRAM(S): at 03:15

## 2020-10-07 RX ADMIN — Medication 2: at 22:40

## 2020-10-07 RX ADMIN — Medication 8 UNIT(S): at 08:32

## 2020-10-07 RX ADMIN — Medication 1 MILLIGRAM(S): at 12:21

## 2020-10-07 RX ADMIN — Medication 1 TABLET(S): at 12:21

## 2020-10-07 RX ADMIN — ENOXAPARIN SODIUM 40 MILLIGRAM(S): 100 INJECTION SUBCUTANEOUS at 12:22

## 2020-10-07 RX ADMIN — Medication 50 MILLIGRAM(S): at 15:07

## 2020-10-07 RX ADMIN — Medication 2: at 17:25

## 2020-10-07 RX ADMIN — INSULIN GLARGINE 30 UNIT(S): 100 INJECTION, SOLUTION SUBCUTANEOUS at 22:41

## 2020-10-07 RX ADMIN — LOSARTAN POTASSIUM 50 MILLIGRAM(S): 100 TABLET, FILM COATED ORAL at 05:12

## 2020-10-07 RX ADMIN — ATORVASTATIN CALCIUM 80 MILLIGRAM(S): 80 TABLET, FILM COATED ORAL at 21:33

## 2020-10-07 RX ADMIN — Medication 4: at 08:31

## 2020-10-07 RX ADMIN — Medication 8 UNIT(S): at 12:21

## 2020-10-07 RX ADMIN — Medication 4: at 12:21

## 2020-10-07 RX ADMIN — Medication 100 MILLIGRAM(S): at 12:21

## 2020-10-07 RX ADMIN — Medication 11 UNIT(S): at 17:25

## 2020-10-07 NOTE — PROGRESS NOTE ADULT - SUBJECTIVE AND OBJECTIVE BOX
KOBI SOSA  55y  Male      Patient is a 55y old  Male who presents with a chief complaint of Lightheadedness (06 Oct 2020 13:37)      INTERVAL HPI/OVERNIGHT EVENTS: Pt seen at bedside. Pt had nosebleed overnight which he states is common for him. Pt continues to endorse some dizziness on standing. No n/v. Blood glucose yesterday uncontrolled on current insulin regimen.      REVIEW OF SYSTEMS:  CONSTITUTIONAL: No fever, weight loss, or fatigue  EYES: No eye pain, visual disturbances, or discharge  ENMT:  No difficulty hearing, tinnitus, vertigo; No sinus or throat pain  NECK: No pain or stiffness  BREASTS: No pain, masses, or nipple discharge  RESPIRATORY: No cough, wheezing, chills or hemoptysis; No shortness of breath  CARDIOVASCULAR: No chest pain, palpitations, dizziness, or leg swelling  GASTROINTESTINAL: No abdominal or epigastric pain. No nausea, vomiting, or hematemesis; No diarrhea or constipation. No melena or hematochezia.  GENITOURINARY: No dysuria, frequency, hematuria, or incontinence  NEUROLOGICAL: No headaches, memory loss, loss of strength, numbness, or tremors  SKIN: No itching, burning, rashes, or lesions   LYMPH NODES: No enlarged glands  ENDOCRINE: No heat or cold intolerance; No hair loss  MUSCULOSKELETAL: No joint pain or swelling; No muscle, back, or extremity pain  PSYCHIATRIC: No depression, anxiety, mood swings, or difficulty sleeping  HEME/LYMPH: No easy bruising, or bleeding gums  ALLERY AND IMMUNOLOGIC: No hives or eczema  FAMILY HISTORY:    T(C): 36.4 (10-07-20 @ 08:00), Max: 36.9 (10-06-20 @ 11:50)  HR: 87 (10-07-20 @ 08:00) (69 - 88)  BP: 137/99 (10-07-20 @ 08:00) (127/91 - 137/99)  RR: 16 (10-07-20 @ 08:00) (16 - 18)  SpO2: 100% (10-07-20 @ 08:00) (99% - 100%)  Wt(kg): --Vital Signs Last 24 Hrs      PHYSICAL EXAM:  GENERAL: NAD, well-groomed, well-developed  HEAD:  Atraumatic, Normocephalic  EYES: EOMI, PERRLA, conjunctiva and sclera clear  ENMT: No tonsillar erythema, exudates, or enlargement; Moist mucous membranes, Good dentition, No lesions  NECK: Supple, No JVD, Normal thyroid  NERVOUS SYSTEM: +Pt can walk but appears slightly unsteady, sensation intact in feet and legs. No tremors with outstretched arms. Alert & Oriented X3, Good concentration; Motor Strength 5/5 B/L upper and lower extremities  CHEST/LUNG: Clear to percussion bilaterally; No rales, rhonchi, wheezing, or rubs  HEART: Regular rate and rhythm; No murmurs, rubs, or gallops  ABDOMEN: Soft, Nontender, Nondistended; Bowel sounds present  EXTREMITIES:  2+ Peripheral Pulses, No clubbing, cyanosis, or edema  LYMPH: No lymphadenopathy noted  SKIN: No rashes or lesions    Consultant(s) Notes Reviewed:  [x ] YES  [ ] NO  Care Discussed with Consultants/Other Providers [ x] YES  [ ] NO    LABS:                        12.8   4.76  )-----------( 158      ( 06 Oct 2020 05:40 )             38.9     10-    138  |  99  |  18  ----------------------------<  98  3.5   |  26  |  0.90    Ca    8.7      06 Oct 2020 05:40  Phos  3.7     10-06  Mg     1.7     10-    TPro  6.0  /  Alb  3.9  /  TBili  0.5  /  DBili  x   /  AST  18  /  ALT  21  /  AlkPhos  49  10-06      Urinalysis Basic - ( 05 Oct 2020 13:00 )    Color: LIGHT YELLOW / Appearance: CLEAR / S.031 / pH: 6.0  Gluc: >1000 / Ketone: MODERATE  / Bili: NEGATIVE / Urobili: NORMAL   Blood: NEGATIVE / Protein: 20 / Nitrite: NEGATIVE   Leuk Esterase: NEGATIVE / RBC: 0-2 / WBC 0-2   Sq Epi: OCC / Non Sq Epi: x / Bacteria: NEGATIVE        RADIOLOGY & ADDITIONAL TESTS:    Imaging Personally Reviewed:  [ ] YES  [ ] NO  amLODIPine   Tablet 5 milliGRAM(s) Oral daily  atorvastatin 80 milliGRAM(s) Oral at bedtime  chlordiazePOXIDE 50 milliGRAM(s) Oral every 12 hours  dextrose 40% Gel 15 Gram(s) Oral once PRN  dextrose 5%. 1000 milliLiter(s) IV Continuous <Continuous>  dextrose 50% Injectable 12.5 Gram(s) IV Push once  dextrose 50% Injectable 25 Gram(s) IV Push once  dextrose 50% Injectable 25 Gram(s) IV Push once  enoxaparin Injectable 40 milliGRAM(s) SubCutaneous daily  folic acid 1 milliGRAM(s) Oral daily  glucagon  Injectable 1 milliGRAM(s) IntraMuscular once PRN  influenza   Vaccine 0.5 milliLiter(s) IntraMuscular once  insulin glargine Injectable (LANTUS) 24 Unit(s) SubCutaneous at bedtime  insulin lispro (HumaLOG) corrective regimen sliding scale   SubCutaneous three times a day before meals  insulin lispro (HumaLOG) corrective regimen sliding scale   SubCutaneous at bedtime  insulin lispro Injectable (HumaLOG) 8 Unit(s) SubCutaneous three times a day before meals  losartan 50 milliGRAM(s) Oral daily  multivitamin 1 Tablet(s) Oral daily  thiamine 100 milliGRAM(s) Oral daily

## 2020-10-07 NOTE — CONSULT NOTE ADULT - SUBJECTIVE AND OBJECTIVE BOX
HPI:  Pt is a 54 yo M with a hx of alcohol use, HTN, uncontrolled DM, HLD presenting with lightheadedness and visual disturbances. Pt was hospitalized mid August 2020 at [facility] for alcohol withdrawal c/b seizure. Consulted for DKA.    Endocrine History:  T2DM for 2-3 years follows with PCP with hgb a1c of 10.7 on basaglar 24 units metformin 1000 mg BID and januvia 100 mg qday. Checks predinner FS and it is usually 200s wiht no hypoglycemia. Patient has relapsed with alcohol and eating worse after his wife passed away earlier this year. No known complications of diabetes.         PAST MEDICAL & SURGICAL HISTORY:  EtOH dependence    HTN (hypertension)    Diabetes    Status post placement of implantable loop recorder        FAMILY HISTORY: Mother has T2DM       Social History: 1/2 bottle of vodka on weekends. No tobacco or illlicit drug use.     Outpatient Medications: Home Medications:  amLODIPine 5 mg oral tablet: 1 tab(s) orally once a day (05 Oct 2020 14:52)  atorvastatin 80 mg oral tablet: 1 tab(s) orally once a day (05 Oct 2020 14:51)  Basaglar KwikPen 100 units/mL subcutaneous solution: 24 unit(s) subcutaneous once a day (at bedtime) (05 Oct 2020 14:52)  folic acid 1 mg oral tablet: 1 tab(s) orally once a day (05 Oct 2020 14:51)  Januvia 100 mg oral tablet: 1 tab(s) orally once a day (05 Oct 2020 14:52)  losartan 50 mg oral tablet: 1 tab(s) orally once a day (05 Oct 2020 14:52)  metFORMIN 1000 mg oral tablet: 1 tab(s) orally 2 times a day (05 Oct 2020 14:52)      MEDICATIONS  (STANDING):  amLODIPine   Tablet 5 milliGRAM(s) Oral daily  atorvastatin 80 milliGRAM(s) Oral at bedtime  dextrose 5%. 1000 milliLiter(s) (50 mL/Hr) IV Continuous <Continuous>  dextrose 50% Injectable 12.5 Gram(s) IV Push once  dextrose 50% Injectable 25 Gram(s) IV Push once  dextrose 50% Injectable 25 Gram(s) IV Push once  enoxaparin Injectable 40 milliGRAM(s) SubCutaneous daily  folic acid 1 milliGRAM(s) Oral daily  influenza   Vaccine 0.5 milliLiter(s) IntraMuscular once  insulin glargine Injectable (LANTUS) 30 Unit(s) SubCutaneous at bedtime  insulin lispro (HumaLOG) corrective regimen sliding scale   SubCutaneous three times a day before meals  insulin lispro (HumaLOG) corrective regimen sliding scale   SubCutaneous at bedtime  insulin lispro Injectable (HumaLOG) 11 Unit(s) SubCutaneous three times a day before meals  losartan 50 milliGRAM(s) Oral daily  multivitamin 1 Tablet(s) Oral daily  thiamine 100 milliGRAM(s) Oral daily    MEDICATIONS  (PRN):  dextrose 40% Gel 15 Gram(s) Oral once PRN Blood Glucose LESS THAN 70 milliGRAM(s)/deciliter  glucagon  Injectable 1 milliGRAM(s) IntraMuscular once PRN Glucose LESS THAN 70 milligrams/deciliter      Allergies    No Known Allergies    Intolerances      Review of Systems:  Constitutional: No fever, chills   Neuro: No tremors, headache   Cardiovascular: No chest pain, palpitations  Respiratory: No SOB, no cough  GI: No nausea, vomiting, abdominal pain  : No dysuria, polyuria   Skin: no rash, ulcers   Psych: no depression, anxiety   Endocrine: no polyphagia, polydipsia     ALL OTHER SYSTEMS REVIEWED AND NEGATIVE        PHYSICAL EXAM:  VITALS: T(C): 36.4 (10-07-20 @ 12:53)  T(F): 97.6 (10-07-20 @ 12:53), Max: 98.2 (10-06-20 @ 17:50)  HR: 94 (10-07-20 @ 12:53) (69 - 94)  BP: 127/87 (10-07-20 @ 12:53) (127/87 - 137/99)  RR:  (16 - 17)  SpO2:  (98% - 100%)  Wt(kg): --  GENERAL: NAD, well-groomed, well-developed  EYES: No proptosis, anicteric  HEENT:  Atraumatic, Normocephalic, moist mucous membranes  RESPIRATORY: Clear to auscultation bilaterally; No rales, rhonchi, wheezing  CARDIOVASCULAR: Regular rate and rhythm; No murmurs  GI: Soft, nontender, non distended, normal bowel sounds  SKIN: Dry, intact, No rashes or lesions  NEURO: AOx3, moves all extremities spontaneously   PSYCH: Reactive affect, euthymic mood    POCT Blood Glucose.: 223 mg/dL (10-07-20 @ 12:10)  POCT Blood Glucose.: 233 mg/dL (10-07-20 @ 08:27)  POCT Blood Glucose.: 340 mg/dL (10-06-20 @ 21:48)  POCT Blood Glucose.: 257 mg/dL (10-06-20 @ 17:28)  POCT Blood Glucose.: 283 mg/dL (10-06-20 @ 11:46)  POCT Blood Glucose.: 112 mg/dL (10-06-20 @ 08:22)  POCT Blood Glucose.: 125 mg/dL (10-05-20 @ 21:17)  POCT Blood Glucose.: 299 mg/dL (10-05-20 @ 18:19)  POCT Blood Glucose.: 317 mg/dL (10-05-20 @ 13:58)  POCT Blood Glucose.: 453 mg/dL (10-05-20 @ 11:00)                            12.8   4.76  )-----------( 158      ( 06 Oct 2020 05:40 )             38.9       10-06    138  |  99  |  18  ----------------------------<  98  3.5   |  26  |  0.90    EGFR if : 113  EGFR if non : 97    Ca    8.7      10-06  Mg     1.7     10-06  Phos  3.7     10-06    TPro  6.0  /  Alb  3.9  /  TBili  0.5  /  DBili  x   /  AST  18  /  ALT  21  /  AlkPhos  49  10-06      Thyroid Function Tests:          Hemoglobin A1C     Radiology:

## 2020-10-07 NOTE — PROVIDER CONTACT NOTE (OTHER) - ASSESSMENT
mild nasal bleeding right nostril - not actively/profusely bleeding, not in distress, alert and responsive, v/s stable; as per patient, it happened before

## 2020-10-07 NOTE — CONSULT NOTE ADULT - ASSESSMENT
54 yo M with a hx of alcohol use, HTN, uncontrolled DM, HLD presenting with lightheadedness and visual disturbances. Pt was hospitalized mid August 2020 at [facility] for alcohol withdrawal c/b seizure. Consulted for DKA.    #DKA- DKA vs alcoholic ketoacidosis. Glucose 457 with AG of 15 and BHOB of 1.4. Lactate 2.8.   patient motivated to change his life around given this wake up call.   -lantus 30 units qhs  -humalog 11 units tidac  -humalog moderate dose correction scale tidac and qhs   -nutrition consult     discharge  basal bolus insulin. DC metformin and januvia.   [facility]  [address] [place] [address], [address], [place] [address]  [phone]    #HTN  -continue norvasc bp at goal     #HLD   -continue lipitor   -lipid panel     Harjinder Baez MD, Endocrine Fellow   Pager  from 9-5PM. After hours and on weekends please call [phone] 56 yo M with a hx of alcohol use, HTN, uncontrolled DM, HLD presenting with lightheadedness and visual disturbances. Pt was hospitalized mid August 2020 at [facility] for alcohol withdrawal c/b seizure. Consulted for DKA.    #DKA- DKA vs alcoholic ketoacidosis. Glucose 457 with AG of 15 and BHOB of 1.4. Lactate 2.8.   patient motivated to change his life around given this wake up call.   -lantus 30 units qhs  -humalog 11 units tidac  -humalog moderate dose correction scale tidac and qhs   -nutrition consult     discharge  basal bolus insulin. DC metformin and januvia given etoh use and risk for pancreatitis    [facility]  [address] [place] [address], [address], [place] [address]  [phone]    #HTN  -continue norvasc bp at goal     #HLD   -continue lipitor   -lipid panel     Harjinder Baez MD, Endocrine Fellow   Pager  from 9-5PM. After hours and on weekends please call [phone]

## 2020-10-07 NOTE — CONSULT NOTE ADULT - ATTENDING COMMENTS
Anel Diaz MD  Pager [phone] ([facility])/ [phone] ([facility]) [please provide 10 digit call back number]  Nights and weekends: [phone]  Please note that this patient may be followed by a different provider tomorrow. If no answer or after hours, please contact [phone].  For final dc reccomendations, please call [phone] or page the endocrine fellow on call.

## 2020-10-07 NOTE — PROGRESS NOTE ADULT - PROBLEM SELECTOR PLAN 2
-blood glucose yesterday highest 340  -c/w home lantus 24 u  -humalog 8 TID  - pt on metformin and januvia at home  -consult endocrine -blood glucose yesterday highest 340  -c/w home lantus 28u  -humalog 8 TID  - pt on metformin and januvia at home  -consult endocrine

## 2020-10-07 NOTE — PROGRESS NOTE ADULT - PROBLEM SELECTOR PLAN 1
-Librium taper w Ativan PRN  -s/p high dose thiamine in ED, c/w oral thiamine  -c/w home folic acid 1mg  -multivitamin

## 2020-10-08 ENCOUNTER — TRANSCRIPTION ENCOUNTER (OUTPATIENT)
Age: 55
End: 2020-10-08

## 2020-10-08 VITALS
SYSTOLIC BLOOD PRESSURE: 118 MMHG | OXYGEN SATURATION: 99 % | TEMPERATURE: 98 F | HEART RATE: 78 BPM | RESPIRATION RATE: 17 BRPM | DIASTOLIC BLOOD PRESSURE: 85 MMHG

## 2020-10-08 PROCEDURE — 99232 SBSQ HOSP IP/OBS MODERATE 35: CPT

## 2020-10-08 PROCEDURE — 99238 HOSP IP/OBS DSCHRG MGMT 30/<: CPT | Mod: GC

## 2020-10-08 RX ORDER — SITAGLIPTIN 50 MG/1
1 TABLET, FILM COATED ORAL
Qty: 0 | Refills: 0 | DISCHARGE

## 2020-10-08 RX ORDER — INSULIN GLARGINE 100 [IU]/ML
24 INJECTION, SOLUTION SUBCUTANEOUS
Qty: 0 | Refills: 0 | DISCHARGE

## 2020-10-08 RX ORDER — ISOPROPYL ALCOHOL, BENZOCAINE .7; .06 ML/ML; ML/ML
1 SWAB TOPICAL
Qty: 100 | Refills: 1
Start: 2020-10-08 | End: 2020-11-26

## 2020-10-08 RX ORDER — INSULIN LISPRO 100/ML
11 VIAL (ML) SUBCUTANEOUS
Qty: 900 | Refills: 0
Start: 2020-10-08 | End: 2020-11-06

## 2020-10-08 RX ORDER — INSULIN GLARGINE 100 [IU]/ML
30 INJECTION, SOLUTION SUBCUTANEOUS
Qty: 900 | Refills: 0
Start: 2020-10-08 | End: 2020-11-06

## 2020-10-08 RX ORDER — METFORMIN HYDROCHLORIDE 850 MG/1
1 TABLET ORAL
Qty: 0 | Refills: 0 | DISCHARGE

## 2020-10-08 RX ADMIN — INFLUENZA VIRUS VACCINE 0.5 MILLILITER(S): 15; 15; 15; 15 SUSPENSION INTRAMUSCULAR at 14:55

## 2020-10-08 RX ADMIN — ENOXAPARIN SODIUM 40 MILLIGRAM(S): 100 INJECTION SUBCUTANEOUS at 12:30

## 2020-10-08 RX ADMIN — Medication 1 TABLET(S): at 12:30

## 2020-10-08 RX ADMIN — Medication 11 UNIT(S): at 08:39

## 2020-10-08 RX ADMIN — Medication 2: at 12:31

## 2020-10-08 RX ADMIN — Medication 1 MILLIGRAM(S): at 12:29

## 2020-10-08 RX ADMIN — Medication 2: at 08:38

## 2020-10-08 RX ADMIN — AMLODIPINE BESYLATE 5 MILLIGRAM(S): 2.5 TABLET ORAL at 05:17

## 2020-10-08 RX ADMIN — Medication 11 UNIT(S): at 12:31

## 2020-10-08 RX ADMIN — LOSARTAN POTASSIUM 50 MILLIGRAM(S): 100 TABLET, FILM COATED ORAL at 05:17

## 2020-10-08 RX ADMIN — Medication 100 MILLIGRAM(S): at 12:38

## 2020-10-08 NOTE — DISCHARGE NOTE NURSING/CASE MANAGEMENT/SOCIAL WORK - PATIENT PORTAL LINK FT
You can access the FollowMyHealth Patient Portal offered by [facility] by registering at the following website: [email or web address]. By joining [facility]’s FollowMyHealth portal, you will also be able to view your health information using other applications (apps) compatible with our system.

## 2020-10-08 NOTE — SBIRT NOTE ADULT - NSSBIRTBRIEFINTDET_GEN_A_CORE
Screening results were reviewed with the patient and patient was provided information about healthy guidelines and potential negative consequences associated with level of risk. Motivation and readiness to reduce or stop use was discussed and goals and activities to make changes were suggested/offered. Provided patient with information for AA and [employer]. Pt receptive to engagement in consult and agreeable to reviewing healthy drinking guidelines. This writer provided pt with [facility] Addiction Services at [facility]: [address], [address], [address] [place] [address] p: [phone], [facility] Crisis walk in clinic p:[phone], [facility] Treatment/Care Services for Substance Use List. This writer additionally reviewed "Rethink Drinking" booklet from [employer] - [place] Department of Health and Human Services.

## 2020-10-08 NOTE — PROGRESS NOTE ADULT - ATTENDING COMMENTS
Pt seen and examined, chart and labs reviewed.  Pt feels much better today, no tremors noted on exam, reports resolution of dizziness.  Pt does express motivation to quit alcohol and denies any major depression or SI/HI despite loss of his wife this year 2/2 COVID.      #ETOH withdrawal: will expedite taper today, CIWA remains 0-2.  SBIRT to meet with pt re: ETOH cessation programs. Continue MVI/Thiamine/Folate  #Uncontrolled DM2: A1c 10 initially presented with mild DKA, now resolved.  Diabetes reeducation, pt already familiar with FSG checks and insulin administration. Will have RN to bedside teaching again  #HTN: BP acceptable, resume current PO meds  #Dispo: anticipate dc home in AM
Pt seen and examined, chart and labs reviewed.  Pt feels well, tremors resolved, no visual/auditory hallucinations/tongue fasciculations.  On ambulation, pt noted to feel slightly dizzy, but appeared stable with minimal fall risk.  FSG have been uncontrolled in past 24 hours despite basal/bolus regimen.  Pt does not check her FSG regularly but does take his meds as scheduled.  Pt also admits to poor dietary adherence.     #ETOH withdrawal: completed PO librium taper, may dc CIWA monitoring.  SBIRT to meet with pt re: ETOH cessation programs. Continue MVI/Thiamine/Folate  #Uncontrolled DM2: A1c 10 initially presented with mild DKA, now resolved.  Diabetes reeducation, pt already familiar with FSG checks and insulin administration. RN provided bedside teaching.  Would increase basal insulin; pt will likely need basal/bolus regimen at home, endo to be consulted.   #HTN: BP acceptable, resume current PO meds  #Dispo: anticipate dc home tomorrow
Pt seen and examined, chart and labs reviewed.  Pt feels well, tremors resolved, no visual/auditory hallucinations/tongue fasciculations.  On ambulation, pt noted to feel slightly dizzy, but appeared stable with minimal fall risk. FSG better controlled with uptitrated basal/bolus regimen.      #ETOH withdrawal: completed PO librium taper.  SBIRT to meet with pt re: ETOH cessation programs. Continue MVI/Thiamine/Folate  #Uncontrolled DM2: A1c 10 initially presented with mild DKA, now resolved.  Diabetes reeducation, pt already familiar with FSG checks and insulin administration. RN provided bedside teaching.  Pt is comfortable with basal/bolus regimen, inquiring about how to keep his insulin refrigerated while at work and he was amenable with carrying a cooler.    #HTN: BP acceptable, resume current PO meds  #Dispo: medically stable for dc home today. DC time: 22 minutes

## 2020-10-08 NOTE — PROGRESS NOTE ADULT - PROBLEM SELECTOR PROBLEM 2
Essential hypertension
Hyperglycemia due to type 1 diabetes mellitus

## 2020-10-08 NOTE — PROGRESS NOTE ADULT - NUTRITIONAL ASSESSMENT
This patient has been assessed with a concern for Malnutrition and has been determined to have a diagnosis/diagnoses of Moderate protein-calorie malnutrition.    This patient is being managed with:   Diet Regular-  Consistent Carbohydrate {No Snacks} (CSTCHO)  DASH/TLC {Sodium & Cholesterol Restricted} (DASH)  Entered: Oct  5 2020  2:41PM

## 2020-10-08 NOTE — PROGRESS NOTE ADULT - PROBLEM SELECTOR PLAN 2
-uncontrolled blood glucose level A1c 10.7, in hospital high blood glucose  -f/u endocrine recs below:    -lantus 30 units qhs  -humalog 11 units tidac  -humalog moderate dose correction scale tidac and qhs   -nutrition consult   -DC metformin and januvia given etoh use and risk for pancreatitis

## 2020-10-08 NOTE — PROGRESS NOTE ADULT - SUBJECTIVE AND OBJECTIVE BOX
KOBI SOSA  55y  Male      Patient is a 55y old  Male who presents with a chief complaint of Lightheadedness (06 Oct 2020 13:37)      INTERVAL HPI/OVERNIGHT EVENTS: Pt seen at bedside. Pt continues to endorse some dizziness on standing. No n/v.     REVIEW OF SYSTEMS:  CONSTITUTIONAL: No fever, weight loss, or fatigue  EYES: No eye pain, visual disturbances, or discharge  ENMT:  No difficulty hearing, tinnitus, vertigo; No sinus or throat pain  NECK: No pain or stiffness  BREASTS: No pain, masses, or nipple discharge  RESPIRATORY: No cough, wheezing, chills or hemoptysis; No shortness of breath  CARDIOVASCULAR: No chest pain, palpitations, dizziness, or leg swelling  GASTROINTESTINAL: No abdominal or epigastric pain. No nausea, vomiting, or hematemesis; No diarrhea or constipation. No melena or hematochezia.  GENITOURINARY: No dysuria, frequency, hematuria, or incontinence  NEUROLOGICAL: No headaches, memory loss, loss of strength, numbness, or tremors  SKIN: No itching, burning, rashes, or lesions   LYMPH NODES: No enlarged glands  ENDOCRINE: No heat or cold intolerance; No hair loss  MUSCULOSKELETAL: No joint pain or swelling; No muscle, back, or extremity pain  PSYCHIATRIC: No depression, anxiety, mood swings, or difficulty sleeping  HEME/LYMPH: No easy bruising, or bleeding gums  ALLERY AND IMMUNOLOGIC: No hives or eczema  FAMILY HISTORY:    Vital Signs Last 24 Hrs  T(C): 36.6 (08 Oct 2020 05:15), Max: 36.6 (08 Oct 2020 05:15)  T(F): 97.8 (08 Oct 2020 05:15), Max: 97.8 (08 Oct 2020 05:15)  HR: 78 (08 Oct 2020 05:15) (78 - 94)  BP: 128/92 (08 Oct 2020 05:15) (127/87 - 137/99)  BP(mean): --  RR: 16 (08 Oct 2020 05:15) (16 - 17)  SpO2: 100% (08 Oct 2020 05:15) (98% - 100%)    PHYSICAL EXAM:  GENERAL: NAD, well-groomed, well-developed  HEAD:  Atraumatic, Normocephalic  EYES: EOMI, PERRLA, conjunctiva and sclera clear  ENMT: No tonsillar erythema, exudates, or enlargement; Moist mucous membranes, Good dentition, No lesions  NECK: Supple, No JVD, Normal thyroid  NERVOUS SYSTEM: +Pt can walk but appears slightly unsteady, sensation intact in feet and legs, intact proprioception in toes. No tremors with outstretched arms. Alert & Oriented X3, Good concentration; Motor Strength 5/5 B/L upper and lower extremities  CHEST/LUNG: Clear to percussion bilaterally; No rales, rhonchi, wheezing, or rubs  HEART: Regular rate and rhythm; No murmurs, rubs, or gallops  ABDOMEN: Soft, Nontender, Nondistended; Bowel sounds present  EXTREMITIES:  2+ Peripheral Pulses, No clubbing, cyanosis, or edema  LYMPH: No lymphadenopathy noted  SKIN: No rashes or lesions    Consultant(s) Notes Reviewed:  [x ] YES  [ ] NO  Care Discussed with Consultants/Other Providers [ x] YES  [ ] NO    LABS:      CAPILLARY BLOOD GLUCOSE      POCT Blood Glucose.: 252 mg/dL (07 Oct 2020 22:39)  POCT Blood Glucose.: 165 mg/dL (07 Oct 2020 17:17)  POCT Blood Glucose.: 223 mg/dL (07 Oct 2020 12:10)  POCT Blood Glucose.: 233 mg/dL (07 Oct 2020 08:27)          RADIOLOGY & ADDITIONAL TESTS:    MEDICATIONS  (STANDING):  amLODIPine   Tablet 5 milliGRAM(s) Oral daily  atorvastatin 80 milliGRAM(s) Oral at bedtime  dextrose 5%. 1000 milliLiter(s) (50 mL/Hr) IV Continuous <Continuous>  dextrose 50% Injectable 12.5 Gram(s) IV Push once  dextrose 50% Injectable 25 Gram(s) IV Push once  dextrose 50% Injectable 25 Gram(s) IV Push once  enoxaparin Injectable 40 milliGRAM(s) SubCutaneous daily  folic acid 1 milliGRAM(s) Oral daily  influenza   Vaccine 0.5 milliLiter(s) IntraMuscular once  insulin glargine Injectable (LANTUS) 30 Unit(s) SubCutaneous at bedtime  insulin lispro (HumaLOG) corrective regimen sliding scale   SubCutaneous three times a day before meals  insulin lispro (HumaLOG) corrective regimen sliding scale   SubCutaneous at bedtime  insulin lispro Injectable (HumaLOG) 11 Unit(s) SubCutaneous three times a day before meals  losartan 50 milliGRAM(s) Oral daily  multivitamin 1 Tablet(s) Oral daily  thiamine 100 milliGRAM(s) Oral daily    MEDICATIONS  (PRN):  dextrose 40% Gel 15 Gram(s) Oral once PRN Blood Glucose LESS THAN 70 milliGRAM(s)/deciliter  glucagon  Injectable 1 milliGRAM(s) IntraMuscular once PRN Glucose LESS THAN 70 milligrams/deciliter

## 2020-10-08 NOTE — DIETITIAN INITIAL EVALUATION ADULT. - PERTINENT MEDS FT
MEDICATIONS  (STANDING):  amLODIPine   Tablet 5 milliGRAM(s) Oral daily  atorvastatin 80 milliGRAM(s) Oral at bedtime  dextrose 5%. 1000 milliLiter(s) (50 mL/Hr) IV Continuous <Continuous>  dextrose 50% Injectable 12.5 Gram(s) IV Push once  dextrose 50% Injectable 25 Gram(s) IV Push once  dextrose 50% Injectable 25 Gram(s) IV Push once  enoxaparin Injectable 40 milliGRAM(s) SubCutaneous daily  folic acid 1 milliGRAM(s) Oral daily  influenza   Vaccine 0.5 milliLiter(s) IntraMuscular once  insulin glargine Injectable (LANTUS) 30 Unit(s) SubCutaneous at bedtime  insulin lispro (HumaLOG) corrective regimen sliding scale   SubCutaneous three times a day before meals  insulin lispro (HumaLOG) corrective regimen sliding scale   SubCutaneous at bedtime  insulin lispro Injectable (HumaLOG) 11 Unit(s) SubCutaneous three times a day before meals  losartan 50 milliGRAM(s) Oral daily  multivitamin 1 Tablet(s) Oral daily  thiamine 100 milliGRAM(s) Oral daily

## 2020-10-08 NOTE — DIETITIAN INITIAL EVALUATION ADULT. - OTHER INFO
Pt. admitted w mild DKA.  Also with Hx of ETOH abuse.     States he typically eats a light breakfast, lunch which often consistent of rice/beans, and dinner as his largest meal.  Drinks water. No juice, no soda. Recently has started drinking Glucerna ~1 bottle/day, but not daily.   However, per Pt., decreased appetite x 2 months PTA with resultant weight loss attributed to recent passing of his wife.      Self monitors blood glucose no more than 1x daily.  Does this pre-meal in afternoon with values of at least 200mg/dL.  Denies ever experiencing hypoglycemia.    Pt. only able to teach back only a few sources of carbohydrates.    RDN provided extensive verbal & printed nutrition education re: therapeutic diet.  Discussed carbohydrate food sources, consistent [fiber dense] carbohydrate intake & carb. counting, hypoglycemia prevention/management, & nutrition label reading.  Discouraged consumption of concentrated sweetened beverages.  Also encouraged heart healthy food choices, self glucose monitoring and emphasized endocrinology f/u. Advised on increased risk of hypoglycemia with ETOH consumption.  Pt. receptive to education.     Pt.

## 2020-10-08 NOTE — SBIRT NOTE ADULT - NSSBIRTALCPASSREFTXDET_GEN_A_CORE
This writer provided warm hand off to [employer] :  - [phone] in order to link pt to 120 day care coordination program: [facility] - p: [phone]. Consents electronically signed and pt enrolled, awaiting assignment to care coordinator.

## 2020-10-08 NOTE — PROGRESS NOTE ADULT - PROBLEM SELECTOR PLAN 1
-Librium taper w Ativan PRN  -s/p high dose thiamine in ED, c/w oral thiamine  -c/w home folic acid 1mg  -multivitamin -Librium taper completed 10/7  -s/p high dose thiamine in ED, c/w oral thiamine  -c/w home folic acid 1mg  -multivitamin

## 2020-10-08 NOTE — SBIRT NOTE ADULT - NSSBIRTUNABLESCROTHER_GEN_A_CORE
HC attempted to reach out to patient telephonically. No answer, voicemail left. HC will reattempt contact in 1 hour.

## 2020-10-08 NOTE — PROGRESS NOTE ADULT - SUBJECTIVE AND OBJECTIVE BOX
Chief Complaint: uncontrolled dm2    History:  patient reports good appetite.  May be dc'd today.  Wants to follow up with endcorinology office- Dr. Diaz.  Denies n/v.  Denies s/s of hypoglycemia    MEDICATIONS  (STANDING):  amLODIPine   Tablet 5 milliGRAM(s) Oral daily  atorvastatin 80 milliGRAM(s) Oral at bedtime  dextrose 5%. 1000 milliLiter(s) (50 mL/Hr) IV Continuous <Continuous>  dextrose 50% Injectable 12.5 Gram(s) IV Push once  dextrose 50% Injectable 25 Gram(s) IV Push once  dextrose 50% Injectable 25 Gram(s) IV Push once  enoxaparin Injectable 40 milliGRAM(s) SubCutaneous daily  folic acid 1 milliGRAM(s) Oral daily  insulin glargine Injectable (LANTUS) 30 Unit(s) SubCutaneous at bedtime  insulin lispro (HumaLOG) corrective regimen sliding scale   SubCutaneous three times a day before meals  insulin lispro (HumaLOG) corrective regimen sliding scale   SubCutaneous at bedtime  insulin lispro Injectable (HumaLOG) 11 Unit(s) SubCutaneous three times a day before meals  losartan 50 milliGRAM(s) Oral daily  multivitamin 1 Tablet(s) Oral daily    MEDICATIONS  (PRN):  dextrose 40% Gel 15 Gram(s) Oral once PRN Blood Glucose LESS THAN 70 milliGRAM(s)/deciliter  glucagon  Injectable 1 milliGRAM(s) IntraMuscular once PRN Glucose LESS THAN 70 milligrams/deciliter      Allergies    No Known Allergies    Intolerances      Review of Systems:  Constitutional: No fever  ALL OTHER SYSTEMS REVIEWED AND NEGATIVE        PHYSICAL EXAM:  VITALS: T(C): 36.6 (10-08-20 @ 13:58)  T(F): 97.9 (10-08-20 @ 13:58), Max: 97.9 (10-08-20 @ 13:58)  HR: 78 (10-08-20 @ 13:58) (78 - 84)  BP: 118/85 (10-08-20 @ 13:58) (118/85 - 135/90)  RR:  (16 - 17)  SpO2:  (99% - 100%)  Wt(kg): --  GENERAL: NAD, well-developed  GI: Soft, nontender, non distended  SKIN: Dry, intact, No rashes or lesions  PSYCH: Alert and oriented x 3, normal affect, normal mood      CAPILLARY BLOOD GLUCOSE  POCT Blood Glucose.: 180 mg/dL (08 Oct 2020 12:10)  POCT Blood Glucose.: 161 mg/dL (08 Oct 2020 08:15)  POCT Blood Glucose.: 252 mg/dL (07 Oct 2020 22:39)  POCT Blood Glucose.: 165 mg/dL (07 Oct 2020 17:17)      10-06    138  |  99  |  18  ----------------------------<  98  3.5   |  26  |  0.90    EGFR if : 113  EGFR if non : 97    Ca    8.7      10-06  Mg     1.7     10-06  Phos  3.7     10-06    TPro  6.0  /  Alb  3.9  /  TBili  0.5  /  DBili  x   /  AST  18  /  ALT  21  /  AlkPhos  49  10-06

## 2020-10-08 NOTE — DIETITIAN INITIAL EVALUATION ADULT. - PERTINENT LABORATORY DATA
10-06 Na138 mmol/L Glu 98 mg/dL K+ 3.5 mmol/L Cr  0.90 mg/dL BUN 18 mg/dL 10-06 Phos 3.7 mg/dL 10-06 Alb 3.9 g/dL  Hba1c 10.7%      POCT Blood Glucose.: 180 mg/dL (08 Oct 2020 12:10)  POCT Blood Glucose.: 161 mg/dL (08 Oct 2020 08:15)  POCT Blood Glucose.: 252 mg/dL (07 Oct 2020 22:39)  POCT Blood Glucose.: 165 mg/dL (07 Oct 2020 17:17)

## 2020-10-08 NOTE — SBIRT NOTE ADULT - NSSBIRTALCPOSREINDET_GEN_A_CORE
Pt AAOx3, euthymic mood and congruent affect, speech clear and concise, behavior appropriate to this writer.   Full screen positive. Brief Intervention Performed. Passive Referral To Treatment Attempted. Screening results were reviewed with the patient and patient was provided information about healthy guidelines and potential negative consequences associated with level of risk. Motivation and readiness to reduce or stop use was discussed and goals and activities to make changes were suggested/offered.

## 2020-10-08 NOTE — CHART NOTE - FINDINGS AS BASED ON:
Food acceptance and intake status from observations by staff/Comprehensive nutrition assessment and consultation/Patient Education/Intervention secondary to interdisciplinary rounds

## 2020-10-08 NOTE — PROGRESS NOTE ADULT - ASSESSMENT
56 yo M with a hx of alcohol use, HTN, uncontrolled DM, HLD presenting with lightheadedness and visual disturbances. Pt was hospitalized mid August 2020 at [facility] for alcohol withdrawal c/b seizure. Consulted for DKA.    #DKA- DKA vs alcoholic ketoacidosis in setting of type 2 DM  -continue lantus 30 units qhs  -continue humalog 11 units tidac  -continue humalog moderate dose correction scale tidac and qhs   -nutrition consult - done    discharge  basal bolus insulin.  Please send  humalog kwikpen as test script to check insurance coverage.  Ok to send with current doses  If Humalog not covered- can try alternating with one of following  novolog/apidra/admelog/fiasp  Patient was on basaglar at home- can continue this - basaglar 30 units sq qhs- PEN   DC metformin and januvia given etoh use and risk for pancreatitis      [facility]  [address] [place] [address], [address], [place] [address]  [phone]    Office will call patient to schedule appointments with Dr. Diaz    #HTN  -continue norvasc bp at goal     #HLD   -continue lipitor   -lipid panel

## 2020-10-12 PROBLEM — E11.9 TYPE 2 DIABETES MELLITUS WITHOUT COMPLICATIONS: Chronic | Status: ACTIVE | Noted: 2020-10-05

## 2020-10-12 PROBLEM — F10.20 ALCOHOL DEPENDENCE, UNCOMPLICATED: Chronic | Status: ACTIVE | Noted: 2020-10-05

## 2020-10-12 PROBLEM — I10 ESSENTIAL (PRIMARY) HYPERTENSION: Chronic | Status: ACTIVE | Noted: 2020-10-05

## 2021-01-27 ENCOUNTER — APPOINTMENT (OUTPATIENT)
Dept: ENDOCRINOLOGY | Facility: CLINIC | Age: 56
End: 2021-01-27

## 2022-02-25 ENCOUNTER — INPATIENT (INPATIENT)
Facility: HOSPITAL | Age: 57
LOS: 5 days | Discharge: HOME | End: 2022-03-03
Attending: HOSPITALIST | Admitting: HOSPITALIST
Payer: MEDICAID

## 2022-02-25 VITALS
OXYGEN SATURATION: 94 % | HEART RATE: 110 BPM | RESPIRATION RATE: 20 BRPM | TEMPERATURE: 99 F | HEIGHT: 67 IN | SYSTOLIC BLOOD PRESSURE: 162 MMHG | DIASTOLIC BLOOD PRESSURE: 99 MMHG

## 2022-02-25 DIAGNOSIS — Z95.818 PRESENCE OF OTHER CARDIAC IMPLANTS AND GRAFTS: Chronic | ICD-10-CM

## 2022-02-25 DIAGNOSIS — I21.3 ST ELEVATION (STEMI) MYOCARDIAL INFARCTION OF UNSPECIFIED SITE: ICD-10-CM

## 2022-02-25 LAB
A1C WITH ESTIMATED AVERAGE GLUCOSE RESULT: 10.5 % — HIGH (ref 4–5.6)
ALBUMIN SERPL ELPH-MCNC: 4.1 G/DL — SIGNIFICANT CHANGE UP (ref 3.3–5)
ALP SERPL-CCNC: 63 U/L — SIGNIFICANT CHANGE UP (ref 40–120)
ALT FLD-CCNC: 74 U/L — HIGH (ref 4–41)
ANION GAP SERPL CALC-SCNC: 14 MMOL/L — SIGNIFICANT CHANGE UP (ref 7–14)
APPEARANCE UR: CLEAR — SIGNIFICANT CHANGE UP
APTT BLD: 112.5 SEC — HIGH (ref 27–36.3)
APTT BLD: 31.5 SEC — SIGNIFICANT CHANGE UP (ref 27–36.3)
APTT BLD: 32.7 SEC — SIGNIFICANT CHANGE UP (ref 27–36.3)
AST SERPL-CCNC: 259 U/L — HIGH (ref 4–40)
B PERT DNA SPEC QL NAA+PROBE: SIGNIFICANT CHANGE UP
B PERT+PARAPERT DNA PNL SPEC NAA+PROBE: SIGNIFICANT CHANGE UP
BASOPHILS # BLD AUTO: 0.03 K/UL — SIGNIFICANT CHANGE UP (ref 0–0.2)
BASOPHILS NFR BLD AUTO: 0.2 % — SIGNIFICANT CHANGE UP (ref 0–2)
BILIRUB SERPL-MCNC: 0.4 MG/DL — SIGNIFICANT CHANGE UP (ref 0.2–1.2)
BILIRUB UR-MCNC: NEGATIVE — SIGNIFICANT CHANGE UP
BLD GP AB SCN SERPL QL: NEGATIVE — SIGNIFICANT CHANGE UP
BLOOD GAS VENOUS COMPREHENSIVE RESULT: SIGNIFICANT CHANGE UP
BORDETELLA PARAPERTUSSIS (RAPRVP): SIGNIFICANT CHANGE UP
BUN SERPL-MCNC: 26 MG/DL — HIGH (ref 7–23)
C PNEUM DNA SPEC QL NAA+PROBE: SIGNIFICANT CHANGE UP
CALCIUM SERPL-MCNC: 9 MG/DL — SIGNIFICANT CHANGE UP (ref 8.4–10.5)
CHLORIDE SERPL-SCNC: 100 MMOL/L — SIGNIFICANT CHANGE UP (ref 98–107)
CK MB BLD-MCNC: 4.8 % — HIGH (ref 0–2.5)
CK MB CFR SERPL CALC: 149.2 NG/ML — HIGH
CK MB CFR SERPL CALC: 160.3 NG/ML — HIGH
CK SERPL-CCNC: 3078 U/L — HIGH (ref 30–200)
CO2 SERPL-SCNC: 22 MMOL/L — SIGNIFICANT CHANGE UP (ref 22–31)
COLOR SPEC: COLORLESS — SIGNIFICANT CHANGE UP
CREAT SERPL-MCNC: 1.18 MG/DL — SIGNIFICANT CHANGE UP (ref 0.5–1.3)
DIFF PNL FLD: NEGATIVE — SIGNIFICANT CHANGE UP
EOSINOPHIL # BLD AUTO: 0.02 K/UL — SIGNIFICANT CHANGE UP (ref 0–0.5)
EOSINOPHIL NFR BLD AUTO: 0.1 % — SIGNIFICANT CHANGE UP (ref 0–6)
ESTIMATED AVERAGE GLUCOSE: 255 — SIGNIFICANT CHANGE UP
FLUAV AG NPH QL: SIGNIFICANT CHANGE UP
FLUAV SUBTYP SPEC NAA+PROBE: SIGNIFICANT CHANGE UP
FLUBV AG NPH QL: SIGNIFICANT CHANGE UP
FLUBV RNA SPEC QL NAA+PROBE: SIGNIFICANT CHANGE UP
GLUCOSE BLDC GLUCOMTR-MCNC: 141 MG/DL — HIGH (ref 70–99)
GLUCOSE BLDC GLUCOMTR-MCNC: 208 MG/DL — HIGH (ref 70–99)
GLUCOSE BLDC GLUCOMTR-MCNC: 248 MG/DL — HIGH (ref 70–99)
GLUCOSE SERPL-MCNC: 256 MG/DL — HIGH (ref 70–99)
GLUCOSE UR QL: ABNORMAL
HADV DNA SPEC QL NAA+PROBE: SIGNIFICANT CHANGE UP
HCOV 229E RNA SPEC QL NAA+PROBE: SIGNIFICANT CHANGE UP
HCOV HKU1 RNA SPEC QL NAA+PROBE: SIGNIFICANT CHANGE UP
HCOV NL63 RNA SPEC QL NAA+PROBE: SIGNIFICANT CHANGE UP
HCOV OC43 RNA SPEC QL NAA+PROBE: SIGNIFICANT CHANGE UP
HCT VFR BLD CALC: 36.1 % — LOW (ref 39–50)
HCT VFR BLD CALC: 38 % — LOW (ref 39–50)
HCT VFR BLD CALC: 42.4 % — SIGNIFICANT CHANGE UP (ref 39–50)
HGB BLD-MCNC: 12 G/DL — LOW (ref 13–17)
HGB BLD-MCNC: 12.9 G/DL — LOW (ref 13–17)
HGB BLD-MCNC: 14.2 G/DL — SIGNIFICANT CHANGE UP (ref 13–17)
HMPV RNA SPEC QL NAA+PROBE: SIGNIFICANT CHANGE UP
HPIV1 RNA SPEC QL NAA+PROBE: SIGNIFICANT CHANGE UP
HPIV2 RNA SPEC QL NAA+PROBE: SIGNIFICANT CHANGE UP
HPIV3 RNA SPEC QL NAA+PROBE: SIGNIFICANT CHANGE UP
HPIV4 RNA SPEC QL NAA+PROBE: SIGNIFICANT CHANGE UP
IANC: 11.61 K/UL — HIGH (ref 1.5–8.5)
IMM GRANULOCYTES NFR BLD AUTO: 0.4 % — SIGNIFICANT CHANGE UP (ref 0–1.5)
INR BLD: 1.03 RATIO — SIGNIFICANT CHANGE UP (ref 0.88–1.16)
KETONES UR-MCNC: NEGATIVE — SIGNIFICANT CHANGE UP
LACTATE BLDV-MCNC: 0.8 MMOL/L — SIGNIFICANT CHANGE UP (ref 0.5–2)
LEUKOCYTE ESTERASE UR-ACNC: NEGATIVE — SIGNIFICANT CHANGE UP
LYMPHOCYTES # BLD AUTO: 1.67 K/UL — SIGNIFICANT CHANGE UP (ref 1–3.3)
LYMPHOCYTES # BLD AUTO: 11.7 % — LOW (ref 13–44)
M PNEUMO DNA SPEC QL NAA+PROBE: SIGNIFICANT CHANGE UP
MCHC RBC-ENTMCNC: 29.4 PG — SIGNIFICANT CHANGE UP (ref 27–34)
MCHC RBC-ENTMCNC: 29.7 PG — SIGNIFICANT CHANGE UP (ref 27–34)
MCHC RBC-ENTMCNC: 30.1 PG — SIGNIFICANT CHANGE UP (ref 27–34)
MCHC RBC-ENTMCNC: 33.2 GM/DL — SIGNIFICANT CHANGE UP (ref 32–36)
MCHC RBC-ENTMCNC: 33.5 GM/DL — SIGNIFICANT CHANGE UP (ref 32–36)
MCHC RBC-ENTMCNC: 33.9 GM/DL — SIGNIFICANT CHANGE UP (ref 32–36)
MCV RBC AUTO: 87.6 FL — SIGNIFICANT CHANGE UP (ref 80–100)
MCV RBC AUTO: 88.5 FL — SIGNIFICANT CHANGE UP (ref 80–100)
MCV RBC AUTO: 90 FL — SIGNIFICANT CHANGE UP (ref 80–100)
MONOCYTES # BLD AUTO: 0.87 K/UL — SIGNIFICANT CHANGE UP (ref 0–0.9)
MONOCYTES NFR BLD AUTO: 6.1 % — SIGNIFICANT CHANGE UP (ref 2–14)
NEUTROPHILS # BLD AUTO: 11.61 K/UL — HIGH (ref 1.8–7.4)
NEUTROPHILS NFR BLD AUTO: 81.5 % — HIGH (ref 43–77)
NITRITE UR-MCNC: NEGATIVE — SIGNIFICANT CHANGE UP
NRBC # BLD: 0 /100 WBCS — SIGNIFICANT CHANGE UP
NRBC # FLD: 0 K/UL — SIGNIFICANT CHANGE UP
NT-PROBNP SERPL-SCNC: 1473 PG/ML — HIGH
PH UR: 6 — SIGNIFICANT CHANGE UP (ref 5–8)
PLATELET # BLD AUTO: 186 K/UL — SIGNIFICANT CHANGE UP (ref 150–400)
PLATELET # BLD AUTO: 223 K/UL — SIGNIFICANT CHANGE UP (ref 150–400)
PLATELET # BLD AUTO: 250 K/UL — SIGNIFICANT CHANGE UP (ref 150–400)
POTASSIUM SERPL-MCNC: 5.1 MMOL/L — SIGNIFICANT CHANGE UP (ref 3.5–5.3)
POTASSIUM SERPL-SCNC: 5.1 MMOL/L — SIGNIFICANT CHANGE UP (ref 3.5–5.3)
PROCALCITONIN SERPL-MCNC: 0.46 NG/ML — HIGH (ref 0.02–0.1)
PROT SERPL-MCNC: 7.1 G/DL — SIGNIFICANT CHANGE UP (ref 6–8.3)
PROT UR-MCNC: NEGATIVE — SIGNIFICANT CHANGE UP
PROTHROM AB SERPL-ACNC: 11.9 SEC — SIGNIFICANT CHANGE UP (ref 10.5–13.4)
RAPID RVP RESULT: SIGNIFICANT CHANGE UP
RBC # BLD: 4.08 M/UL — LOW (ref 4.2–5.8)
RBC # BLD: 4.34 M/UL — SIGNIFICANT CHANGE UP (ref 4.2–5.8)
RBC # BLD: 4.71 M/UL — SIGNIFICANT CHANGE UP (ref 4.2–5.8)
RBC # FLD: 13.3 % — SIGNIFICANT CHANGE UP (ref 10.3–14.5)
RBC # FLD: 13.4 % — SIGNIFICANT CHANGE UP (ref 10.3–14.5)
RBC # FLD: 13.5 % — SIGNIFICANT CHANGE UP (ref 10.3–14.5)
RH IG SCN BLD-IMP: POSITIVE — SIGNIFICANT CHANGE UP
RSV RNA NPH QL NAA+NON-PROBE: SIGNIFICANT CHANGE UP
RSV RNA SPEC QL NAA+PROBE: SIGNIFICANT CHANGE UP
RV+EV RNA SPEC QL NAA+PROBE: SIGNIFICANT CHANGE UP
SARS-COV-2 RNA SPEC QL NAA+PROBE: SIGNIFICANT CHANGE UP
SARS-COV-2 RNA SPEC QL NAA+PROBE: SIGNIFICANT CHANGE UP
SODIUM SERPL-SCNC: 136 MMOL/L — SIGNIFICANT CHANGE UP (ref 135–145)
SP GR SPEC: 1.02 — SIGNIFICANT CHANGE UP (ref 1–1.05)
TROPONIN T, HIGH SENSITIVITY RESULT: 1917 NG/L — CRITICAL HIGH
TROPONIN T, HIGH SENSITIVITY RESULT: CRITICAL HIGH NG/L
TSH SERPL-MCNC: 1.15 UIU/ML — SIGNIFICANT CHANGE UP (ref 0.27–4.2)
UROBILINOGEN FLD QL: SIGNIFICANT CHANGE UP
WBC # BLD: 12.62 K/UL — HIGH (ref 3.8–10.5)
WBC # BLD: 14.26 K/UL — HIGH (ref 3.8–10.5)
WBC # BLD: 15.8 K/UL — HIGH (ref 3.8–10.5)
WBC # FLD AUTO: 12.62 K/UL — HIGH (ref 3.8–10.5)
WBC # FLD AUTO: 14.26 K/UL — HIGH (ref 3.8–10.5)
WBC # FLD AUTO: 15.8 K/UL — HIGH (ref 3.8–10.5)

## 2022-02-25 PROCEDURE — 93306 TTE W/DOPPLER COMPLETE: CPT | Mod: 26

## 2022-02-25 PROCEDURE — 93458 L HRT ARTERY/VENTRICLE ANGIO: CPT | Mod: 26,59

## 2022-02-25 PROCEDURE — 92941 PRQ TRLML REVSC TOT OCCL AMI: CPT | Mod: LC

## 2022-02-25 PROCEDURE — 93010 ELECTROCARDIOGRAM REPORT: CPT

## 2022-02-25 PROCEDURE — 33967 INSERT I-AORT PERCUT DEVICE: CPT

## 2022-02-25 PROCEDURE — 99222 1ST HOSP IP/OBS MODERATE 55: CPT | Mod: 25

## 2022-02-25 PROCEDURE — 71045 X-RAY EXAM CHEST 1 VIEW: CPT | Mod: 26

## 2022-02-25 PROCEDURE — 99291 CRITICAL CARE FIRST HOUR: CPT | Mod: 25

## 2022-02-25 RX ORDER — DEXTROSE 50 % IN WATER 50 %
12.5 SYRINGE (ML) INTRAVENOUS ONCE
Refills: 0 | Status: DISCONTINUED | OUTPATIENT
Start: 2022-02-25 | End: 2022-03-03

## 2022-02-25 RX ORDER — DEXTROSE 50 % IN WATER 50 %
25 SYRINGE (ML) INTRAVENOUS ONCE
Refills: 0 | Status: DISCONTINUED | OUTPATIENT
Start: 2022-02-25 | End: 2022-03-03

## 2022-02-25 RX ORDER — HEPARIN SODIUM 5000 [USP'U]/ML
INJECTION INTRAVENOUS; SUBCUTANEOUS
Qty: 25000 | Refills: 0 | Status: DISCONTINUED | OUTPATIENT
Start: 2022-02-25 | End: 2022-02-25

## 2022-02-25 RX ORDER — HEPARIN SODIUM 5000 [USP'U]/ML
4000 INJECTION INTRAVENOUS; SUBCUTANEOUS ONCE
Refills: 0 | Status: COMPLETED | OUTPATIENT
Start: 2022-02-25 | End: 2022-02-25

## 2022-02-25 RX ORDER — DEXTROSE 50 % IN WATER 50 %
15 SYRINGE (ML) INTRAVENOUS ONCE
Refills: 0 | Status: DISCONTINUED | OUTPATIENT
Start: 2022-02-25 | End: 2022-03-03

## 2022-02-25 RX ORDER — INSULIN LISPRO 100/ML
VIAL (ML) SUBCUTANEOUS AT BEDTIME
Refills: 0 | Status: DISCONTINUED | OUTPATIENT
Start: 2022-02-25 | End: 2022-03-03

## 2022-02-25 RX ORDER — SODIUM CHLORIDE 9 MG/ML
1000 INJECTION, SOLUTION INTRAVENOUS
Refills: 0 | Status: DISCONTINUED | OUTPATIENT
Start: 2022-02-25 | End: 2022-03-03

## 2022-02-25 RX ORDER — HEPARIN SODIUM 5000 [USP'U]/ML
1000 INJECTION INTRAVENOUS; SUBCUTANEOUS
Qty: 25000 | Refills: 0 | Status: DISCONTINUED | OUTPATIENT
Start: 2022-02-25 | End: 2022-02-28

## 2022-02-25 RX ORDER — FUROSEMIDE 40 MG
40 TABLET ORAL DAILY
Refills: 0 | Status: DISCONTINUED | OUTPATIENT
Start: 2022-02-25 | End: 2022-02-26

## 2022-02-25 RX ORDER — ACETAMINOPHEN 500 MG
1000 TABLET ORAL ONCE
Refills: 0 | Status: COMPLETED | OUTPATIENT
Start: 2022-02-25 | End: 2022-02-25

## 2022-02-25 RX ORDER — VANCOMYCIN HCL 1 G
1000 VIAL (EA) INTRAVENOUS EVERY 12 HOURS
Refills: 0 | Status: DISCONTINUED | OUTPATIENT
Start: 2022-02-25 | End: 2022-03-01

## 2022-02-25 RX ORDER — TICAGRELOR 90 MG/1
90 TABLET ORAL EVERY 12 HOURS
Refills: 0 | Status: DISCONTINUED | OUTPATIENT
Start: 2022-02-26 | End: 2022-03-03

## 2022-02-25 RX ORDER — ASPIRIN/CALCIUM CARB/MAGNESIUM 324 MG
324 TABLET ORAL ONCE
Refills: 0 | Status: COMPLETED | OUTPATIENT
Start: 2022-02-25 | End: 2022-02-25

## 2022-02-25 RX ORDER — PIPERACILLIN AND TAZOBACTAM 4; .5 G/20ML; G/20ML
3.38 INJECTION, POWDER, LYOPHILIZED, FOR SOLUTION INTRAVENOUS EVERY 8 HOURS
Refills: 0 | Status: DISCONTINUED | OUTPATIENT
Start: 2022-02-25 | End: 2022-02-26

## 2022-02-25 RX ORDER — ASPIRIN/CALCIUM CARB/MAGNESIUM 324 MG
81 TABLET ORAL DAILY
Refills: 0 | Status: DISCONTINUED | OUTPATIENT
Start: 2022-02-26 | End: 2022-03-03

## 2022-02-25 RX ORDER — PIPERACILLIN AND TAZOBACTAM 4; .5 G/20ML; G/20ML
3.38 INJECTION, POWDER, LYOPHILIZED, FOR SOLUTION INTRAVENOUS ONCE
Refills: 0 | Status: COMPLETED | OUTPATIENT
Start: 2022-02-25 | End: 2022-02-25

## 2022-02-25 RX ORDER — ATORVASTATIN CALCIUM 80 MG/1
80 TABLET, FILM COATED ORAL AT BEDTIME
Refills: 0 | Status: DISCONTINUED | OUTPATIENT
Start: 2022-02-25 | End: 2022-03-03

## 2022-02-25 RX ORDER — GLUCAGON INJECTION, SOLUTION 0.5 MG/.1ML
1 INJECTION, SOLUTION SUBCUTANEOUS ONCE
Refills: 0 | Status: DISCONTINUED | OUTPATIENT
Start: 2022-02-25 | End: 2022-03-03

## 2022-02-25 RX ORDER — CHLORHEXIDINE GLUCONATE 213 G/1000ML
1 SOLUTION TOPICAL
Refills: 0 | Status: DISCONTINUED | OUTPATIENT
Start: 2022-02-25 | End: 2022-03-03

## 2022-02-25 RX ORDER — HEPARIN SODIUM 5000 [USP'U]/ML
1000 INJECTION INTRAVENOUS; SUBCUTANEOUS
Qty: 25000 | Refills: 0 | Status: DISCONTINUED | OUTPATIENT
Start: 2022-02-25 | End: 2022-02-25

## 2022-02-25 RX ORDER — ACETAMINOPHEN 500 MG
650 TABLET ORAL EVERY 6 HOURS
Refills: 0 | Status: DISCONTINUED | OUTPATIENT
Start: 2022-02-25 | End: 2022-03-03

## 2022-02-25 RX ORDER — INSULIN GLARGINE 100 [IU]/ML
10 INJECTION, SOLUTION SUBCUTANEOUS AT BEDTIME
Refills: 0 | Status: DISCONTINUED | OUTPATIENT
Start: 2022-02-25 | End: 2022-02-27

## 2022-02-25 RX ORDER — HEPARIN SODIUM 5000 [USP'U]/ML
4000 INJECTION INTRAVENOUS; SUBCUTANEOUS EVERY 6 HOURS
Refills: 0 | Status: DISCONTINUED | OUTPATIENT
Start: 2022-02-25 | End: 2022-02-25

## 2022-02-25 RX ORDER — TICAGRELOR 90 MG/1
180 TABLET ORAL ONCE
Refills: 0 | Status: COMPLETED | OUTPATIENT
Start: 2022-02-25 | End: 2022-02-25

## 2022-02-25 RX ORDER — INSULIN LISPRO 100/ML
VIAL (ML) SUBCUTANEOUS
Refills: 0 | Status: DISCONTINUED | OUTPATIENT
Start: 2022-02-25 | End: 2022-03-03

## 2022-02-25 RX ADMIN — TICAGRELOR 180 MILLIGRAM(S): 90 TABLET ORAL at 10:47

## 2022-02-25 RX ADMIN — HEPARIN SODIUM 800 UNIT(S)/HR: 5000 INJECTION INTRAVENOUS; SUBCUTANEOUS at 10:48

## 2022-02-25 RX ADMIN — Medication 1000 MILLIGRAM(S): at 17:54

## 2022-02-25 RX ADMIN — PIPERACILLIN AND TAZOBACTAM 25 GRAM(S): 4; .5 INJECTION, POWDER, LYOPHILIZED, FOR SOLUTION INTRAVENOUS at 21:24

## 2022-02-25 RX ADMIN — Medication 250 MILLIGRAM(S): at 18:03

## 2022-02-25 RX ADMIN — CHLORHEXIDINE GLUCONATE 1 APPLICATION(S): 213 SOLUTION TOPICAL at 16:40

## 2022-02-25 RX ADMIN — Medication 650 MILLIGRAM(S): at 17:10

## 2022-02-25 RX ADMIN — HEPARIN SODIUM 10 UNIT(S)/HR: 5000 INJECTION INTRAVENOUS; SUBCUTANEOUS at 19:22

## 2022-02-25 RX ADMIN — Medication 650 MILLIGRAM(S): at 14:09

## 2022-02-25 RX ADMIN — INSULIN GLARGINE 10 UNIT(S): 100 INJECTION, SOLUTION SUBCUTANEOUS at 21:24

## 2022-02-25 RX ADMIN — HEPARIN SODIUM 4000 UNIT(S): 5000 INJECTION INTRAVENOUS; SUBCUTANEOUS at 10:47

## 2022-02-25 RX ADMIN — Medication 400 MILLIGRAM(S): at 16:39

## 2022-02-25 RX ADMIN — ATORVASTATIN CALCIUM 80 MILLIGRAM(S): 80 TABLET, FILM COATED ORAL at 21:23

## 2022-02-25 RX ADMIN — PIPERACILLIN AND TAZOBACTAM 200 GRAM(S): 4; .5 INJECTION, POWDER, LYOPHILIZED, FOR SOLUTION INTRAVENOUS at 16:38

## 2022-02-25 RX ADMIN — Medication 324 MILLIGRAM(S): at 10:47

## 2022-02-25 NOTE — ED ADULT TRIAGE NOTE - CHIEF COMPLAINT QUOTE
pt reports intermittent generalized chest pain radiating to back that began two days ago with worsening symptoms today. pt also reports SOB that began yesterday. pt denies fevers, chills, n/v/d. PMH of HTN, diabetes, loop recorder.

## 2022-02-25 NOTE — H&P ADULT - ATTENDING COMMENTS
55 year old man with history of ETOH abuse, fever, chills, and chest pain with late presentation inferolateral MI.  SP [facility] with PCI x LCX. LVEDP 36 with lasix 40 mg IV x 1    Meds:  ASA  Brilinta  Heparin gtt  Lasix 40 mg IV BID  Atorvastatin  Vanco  Gabrielasyn    #Neuro/Psych-Known ETOH use  Monitor for signs of withdrawl  #CV-Inferolateral STEMI  -Continue ASA/Brilinta/Statin  -FU TTE  -IABP in place  -Continue Lasix 40 mg IV BID  #ID- Febrile with WBC and LLung opacity  Repeat RVP  Continue ABx

## 2022-02-25 NOTE — H&P ADULT - NSHPPHYSICALEXAM_GEN_ALL_CORE
PHYSICAL EXAM:  GENERAL: NAD  HEAD:  Atraumatic, Normocephalic  EYES: EOMI, Sclera clear  ENT: Moist mucous membranes  NECK: Supple, No JVD; no palpable pre-auricular, post-auricular, occipital, mandibular, submental, supra-clavicular, or infra-clavicular lymph nodes   CHEST/LUNG: Clear to auscultation bilaterally; No rales, rhonchi, wheezing, or rubs. Unlabored respirations  HEART: Regular rate and rhythm; could not fully discern murmurs w. intraballon pump in place  ABDOMEN: Soft, Nontender to light and deep palpation, Nondistended but obese abdomen  EXTREMITIES: 2+ Peripheral radial pulses, brisk capillary refill. No clubbing, cyanosis, or bilateral LE edema; R groin without signs of infection or hematoma   NERVOUS SYSTEM: Alert & Oriented x3, speech clear  SKIN: No rashes or lesions PHYSICAL EXAM:  GENERAL: NAD  HEAD:  Atraumatic, Normocephalic  EYES: EOMI, Sclera clear  ENT: Moist mucous membranes  NECK: No palpable pre-auricular, post-auricular, occipital, mandibular, submental, supra-clavicular, or infra-clavicular lymph nodes   CHEST/LUNG: Clear to auscultation bilaterally; No rales, rhonchi, wheezing, or rubs. Unlabored respirations  HEART: Regular rate and rhythm; could not fully discern murmurs w. intraballoon pump in place  ABDOMEN: Soft, Nontender to light and deep palpation, Nondistended but obese abdomen  EXTREMITIES: 2+ Peripheral radial pulses, No bilateral LE edema; R groin without signs of infection or hematoma   NERVOUS SYSTEM: Alert & Oriented x3, speech clear  SKIN: No rashes or lesions

## 2022-02-25 NOTE — H&P ADULT - HISTORY OF PRESENT ILLNESS
54 yo M with a hx of alcohol use, HTN, uncontrolled DM, hospitalization for alcohol withdrawal; TIA on aspirin presented w. acute onset of exertional pressure-like 10/10 chest pain associated with sweats and shortness of breath that started yesterday and persisted constantly throughout the night. Patient says he tried Advil and rest with mild alleviation. However, he experienced no fevers, chills, dizziness,      54 yo M with a hx of alcohol use, HTN, uncontrolled DM, hospitalization for alcohol withdrawal; TIA on aspirin presented w. acute onset of exertional pressure-like 10/10 chest pain w. radiating and associated with sweats and shortness of breath that started yesterday and persisted constantly throughout the night. Patient says he tried Advil and rest with mild alleviation. 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 Yesterday, he also experienced subjective fever and dry coughing. However, patient reports absence of headache, sudden weakness, dizziness, lightheadedness, abdominal pain, urinary symptoms, GI symptoms, or LE edema. His last EtOH drink was ~2 weeks ago.    CCU Course: Patient was febrile to 101.6F with shivers but otherwise asymptomatic; UA negative; Blood cultures collected. Hemodynamically stable and A&Ox4 and saturating within normal range on room air.

## 2022-02-25 NOTE — H&P ADULT - NSHPREVIEWOFSYSTEMS_GEN_ALL_CORE
REVIEW OF SYSTEMS: As indicated above; otherwise negative    CONSTITUTIONAL: No weakness, fevers or chills  EYES/ENT: No visual changes; no vertigo or throat pain   NECK: No pain or stiffness  RESPIRATORY: No cough, wheezing, hemoptysis; No shortness of breath  CARDIOVASCULAR: No chest pain or palpitations  GASTROINTESTINAL: No abdominal or epigastric pain. No nausea, vomiting, or hematemesis; no diarrhea or constipation; no melena or hematochezia.  GENITOURINARY: No dysuria, frequency or hematuria  NEUROLOGICAL: No numbness or weakness  SKIN: No itching, rashes REVIEW OF SYSTEMS: As indicated above; otherwise negative    CONSTITUTIONAL: Chills  EYES/ENT: No visual changes; no vertigo or throat pain   RESPIRATORY: No cough, wheezing, hemoptysis; No shortness of breath  CARDIOVASCULAR: No chest pain or palpitations  GASTROINTESTINAL: No abdominal or epigastric pain. No nausea, vomiting, or hematemesis; no diarrhea or constipation; no melena or hematochezia.  GENITOURINARY: No dysuria, frequency or hematuria  NEUROLOGICAL: No numbness or weakness  SKIN: No itching, rashes

## 2022-02-25 NOTE — ED PROVIDER NOTE - PHYSICAL EXAMINATION
CONSTITUTIONAL: Well-developed; well-nourished; in acute distress.   SKIN: warm, dry  HEAD: Normocephalic; atraumatic.  EYES: no conjunctival injection. PERRL.   ENT: No nasal discharge; airway clear.  NECK: Supple; non tender.  CARD: S1, S2 normal; no murmurs, gallops, or rubs.   RESP: No wheezes, rales or rhonchi.   ABD: soft ntnd, no guarding, no distention, no rigidity.   EXT:  No cyanosis or edema.   NEURO: Alert, oriented, grossly unremarkable  PSYCH: Cooperative, appropriate.

## 2022-02-25 NOTE — ED PROVIDER NOTE - PROGRESS NOTE DETAILS
[place] DO PGY-2: ED attending Dr. Crystal spoke w/ cards regarding STEMI Miller, PGY3 - pt evaluated by cardiology bedside, plan aspirin/brilinta > cath lab ED intake-[facility] The patient is a 56y Male non smoker who has a past medical and surgery history of DM2 HTN HLD EtOH abuse implantable loop recorder PTED with [facility] that has been present since Sunday but became worse since 2pm yesterday; pain has been constant    Vital Signs Last 24 Hrs  T(F): 98.7 HR: 110 BP: 162/99 RR: 20 SpO2: 94% (25 Feb 2022 10:00) (94% - 94%)  PE: as described; my additions and exceptions are noted in the chart    DATA:  EKG: 10:22 am aware of EKG; EKG showed NSR@96 possible LAE; c/w 10/5/20 EKG new CAMERON present in v4-6 and inferior leads c/w ILWMI  LAB: Pending at time of evaluation    IMPRESSION/RISK:  Dx=ILWSTEMI    Consideration include Stat cath lab activation needed   Plan  cardiology fellow notified 10:20am after EKG sent thru [email or web address]   patient to go to spot 11 for definitive care  Pt signed out to Dr Mercedes 10:25am

## 2022-02-25 NOTE — ED PROVIDER NOTE - ATTENDING CONTRIBUTION TO CARE
CRITICAL CARE TIME WAS NECESSARY TO TREAT OR PREVENT LIFE THREATENING DETERIORATION FROM THE FOLLOWING CONDITIONS:  [X  ] Heart Failure and/or Circulatory Collapse and or MI/NSTEMI  [  ] Sepsis [  ] Respiratory failure  [  ]CNS Failure or Compromise    [  ]Dehydration [  ]Renal Failure [  ]Hepatic Failure Sepsis [  ]Endocrine Crisis  [  ]Metabolic Crisis  [  ]Toxidrome   [  ]Trauma    CRITICAL CARE TIME WAS SPENT BY ME IN THE FOLLOWING ACTIVITIES:  [X  ] Performance of the History and Physical Examination [X  ] Review of Old Charts [   ] IV Access and or Obtaining Necessary Diagnostic Tests   [X  ] Ordering and Performing Treatments and Interventions:   Specifically:  [  ] Ventilator Management [  ] Vascular Access Procedures [  ] NGT Placement  [  ] Transcutaneous Pacing  [ X ] Establishment of Goals of Care with the Patient or Their Designated Representative  [X  ] Developing and Evaluating Treatment Plan with Consultants and/or the Primary Provider  [X  ] Serial Reevaluation of the Patients Condition and Response to Therapeutic Measures   Specifically: [ X ] Interpretation of Cardiac and Respiratory Parameters  [X  ]Ordering and Interpretating  Diagnostic Studies  Comments: CRITICAL CARE TIME WAS NECESSARY TO TREAT OR PREVENT LIFE THREATENING DETERIORATION FROM THE FOLLOWING CONDITIONS:  [X  ] Heart Failure and/or Circulatory Collapse and or MI/NSTEMI  [  ] Sepsis [  ] Respiratory failure  [  ]CNS Failure or Compromise    [  ]Dehydration [  ]Renal Failure [  ]Hepatic Failure Sepsis [  ]Endocrine Crisis  [  ]Metabolic Crisis  [  ]Toxidrome   [  ]Trauma    CRITICAL CARE TIME WAS SPENT BY ME IN THE FOLLOWING ACTIVITIES:  [X  ] Performance of the History and Physical Examination [X  ] Review of Old Charts [   ] IV Access and or Obtaining Necessary Diagnostic Tests   [X  ] Ordering and Performing Treatments and Interventions:   Specifically:  [  ] Ventilator Management [  ] Vascular Access Procedures [  ] NGT Placement  [  ] Transcutaneous Pacing  [ X ] Establishment of Goals of Care with the Patient or Their Designated Representative  [X  ] Developing and Evaluating Treatment Plan with Consultants and/or the Primary Provider  [X  ] Serial Reevaluation of the Patients Condition and Response to Therapeutic Measures   Specifically: [ X ] Interpretation of Cardiac and Respiratory Parameters  [X  ]Ordering and Interpretating  Diagnostic Studies  Comments:    Dr. Mercedes: Pt was seen and evaluated by me. Pt is a 57 y/o with PMHx of HTN, DM type 2, and previous TIA on aspirin who presented to the ED for chest pain X 2 days. 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 Pt denies any fever, chills, nausea, vomiting, or abd pain. Lungs CTA b/l. RRR. Abd soft, non-tender. No LE swelling or calf tenderness. Abnormal EKG showing CAMERON in V5, V6, II, III, & avF  Concern for MI  Labs, EKG, CXR, Cardio, ASA

## 2022-02-25 NOTE — H&P ADULT - NSHPLABSRESULTS_GEN_ALL_CORE
LABS:                        14.2   14.26 )-----------( 223      ( 25 Feb 2022 10:40 )             42.4     02-25    136  |  100  |  26<H>  ----------------------------<  256<H>  5.1   |  22  |  1.18    Ca    9.0      25 Feb 2022 10:40    TPro  7.1  /  Alb  4.1  /  TBili  0.4  /  DBili  x   /  AST  259<H>  /  ALT  74<H>  /  AlkPhos  63  02-25    PT/INR - ( 25 Feb 2022 10:40 )   PT: 11.9 sec;   INR: 1.03 ratio         PTT - ( 25 Feb 2022 10:40 )  PTT:31.5 sec  CARDIAC MARKERS ( 25 Feb 2022 10:40 )  x     / x     / x     / x     / 160.3 ng/mL

## 2022-02-25 NOTE — H&P ADULT - NSHPSOCIALHISTORY_GEN_ALL_CORE
Drinks ~4 beers and 1 pint of vodka on weekends. Has not had any EtOH for over the past 2 weeks. 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

## 2022-02-25 NOTE — H&P ADULT - NSICDXPASTMEDICALHX_GEN_ALL_CORE_FT
PAST MEDICAL HISTORY:  Diabetes     EtOH dependence     HTN (hypertension)     Transient cerebral ischemic attack

## 2022-02-25 NOTE — PATIENT PROFILE ADULT - FALL HARM RISK - UNIVERSAL INTERVENTIONS
Bed in lowest position, wheels locked, appropriate side rails in place/Call bell, personal items and telephone in reach/Instruct patient to call for assistance before getting out of bed or chair/Non-slip footwear when patient is out of bed/[place] to call system/Physically safe environment - no spills, clutter or unnecessary equipment/Purposeful Proactive Rounding/Room/bathroom lighting operational, light cord in reach

## 2022-02-25 NOTE — ED PROVIDER NOTE - CLINICAL SUMMARY MEDICAL DECISION MAKING FREE TEXT BOX
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 no hx stents. ekg concerning for lateral STEMI. STEMI code activated. plan labs cxr aspirin/brilinta cath lab

## 2022-02-25 NOTE — H&P ADULT - ASSESSMENT
55 y M with a hx of alcohol use, HTN, uncontrolled DM, hospitalization for alcohol withdrawal; TIA on aspirin presented with acute onset of typical angina and found to have an inferolateral STEMI w. 100% occlusion both proximal LCxs and distal OM /p DESx1. Admitted to CCU for further management.     NEURO:  -Currently A&Ox3  -No active issues    CV:  #Typical angina w. inferolateral wall STEMI (+trops, +EKG changes)  - S/p DESx1 stent:  - On heparin gtt for intraballoon aortic pump    - DAPT: ASA 81 & ticagrelor 90mg BID for at least 1 year  - Hold off B-blockers and reassess for tomorrow   - Hold off ACE-I  will introduce as tolerated once patient is on BB's and renal function with appropriate function  - Started atorvastatin 80mg QD  -Trend troponin to peak  - Ordered TTE stat    PULM:  -No active issues    RENAL:  -SCr 1.18 with unknown baseline  -unremarkable electrolyte derangements  -trend BMP  -euvolemic on exam; monitor urine output    GI:  #Transaminitis: likely in setting of recent inferolateral MI and EtOH use history  -trend LFT's  -DASH/TLC carbs consistent diet       ENDO:  #DM2: HbA1c   - Low Insulin Sliding Scale      HEMATOLOGIC:  #mild normochromic normocytic anemia in setting of recent inferolateral MI Hgb 12.9  -monitor for now    #DVT prophylaxis  -on heparin and DAPT    ID:  #leukocytosis i.s.o inferolaterla MI  -WBC 15.80 from 14.3  -Patient is shivering but w.o fevers or signs of infection  continue to monitor off antibiotics for now      SKIN:  #Lines/tubes: IV peripheral access and intraballoon aortic pump    Dispo:  -pending clinical improvement 55 y M with a hx of alcohol use, HTN, uncontrolled DM, hospitalization for alcohol withdrawal; TIA on aspirin presented with acute onset of typical angina and found to have an inferolateral STEMI w. 100% occlusion both proximal LCxs and distal OM /p DESx1. Admitted to CCU for further management.     NEURO:  -Currently A&Ox3  -No active issues    CV:  #Typical angina w. inferolateral wall STEMI (+trops, +EKG changes)  - S/p DESx1 stent:  - On heparin gtt for intraballoon aortic pump; ordered CXR for morning to assess balloon placement     - DAPT: ASA 81 & ticagrelor 90mg BID for at least 1 year  - Hold off B-blockers and reassess for tomorrow   - Hold off ACE-I will introduce as tolerated once patient is on BB's and renal function with appropriate function  - Started atorvastatin 80mg QD  - Trend troponin to peak  - Ordered TTE stat    PULM:  -WBC elevated w. coughing and subjective fevers that started yesterday and now shivering  -CXR appears to have consolidation in left lung field but questionable; pending finalized recs    RENAL:  -SCr 1.18 with unknown baseline  -unremarkable electrolyte derangements  -trend BMP  -euvolemic on exam; monitor urine output strict i&    GI:  #Transaminitis: likely in setting of recent inferolateral MI and EtOH use history  -Trend LFT's  -DASH/TLC carbs consistent diet     ENDO:  #DM2: HbA1c   - Low Insulin Sliding Scale    HEMATOLOGIC:  #mild normochromic normocytic anemia in setting of recent inferolateral STEMI Hgb 12.9  -monitor for now    #DVT prophylaxis  -on heparin and DAPT    ID:  #SIRS+ w. leukocytosis and fever 101.6F i.s.o inferolateral STEMI  -WBC 15.80 from 14.3  MI and/or possible infection  -CXR appears to have probable pneumonia but finalized read pending   -Patient is shivering and had coughing yesterday and subjective fevers  -UA unremarkable; f.u BCx pending   -plan to start broad spectrum abx    SKIN:  #Lines/tubes: IV peripheral access and intraballoon aortic pump    Dispo:  -pending clinical improvement

## 2022-02-25 NOTE — ED ADULT NURSE REASSESSMENT NOTE - NS ED NURSE REASSESS COMMENT FT1
pt awake and AxOx4, taken to cath lab at this time with cardiology MD and ED RN at bedside. Pt transported on zoll and on 2L NC.

## 2022-02-25 NOTE — ED PROVIDER NOTE - NS ED ROS FT
CONSTITUTIONAL - No fever, No diaphoresis, No weight change  SKIN - No rash  HEMATOLOGIC - No abnormal bleeding or bruising  EYES - No eye pain, No blurred vision  ENT - No change in hearing, No sore throat, No neck pain, No rhinorrhea, No ear pain  RESPIRATORY +shortness of breath, No cough  CARDIAC  +chest pain, No palpitations  GI - No abdominal pain, No nausea, No vomiting, No diarrhea, No constipation  - No dysuria, no frequency, no hematuria.   MUSCULOSKELETAL - No joint pain, No swelling, No back pain  NEUROLOGIC - No numbness, No focal weakness, No headache, No dizziness

## 2022-02-25 NOTE — ED PROVIDER NOTE - CROS ED [facility] ALL NEG
[FreeTextEntry1] : Problem\par 1) High grade cervical dysplasia\par \par Previous Therapy\par 1) pap  4/29/21\par    a) ASCUS\par 2) Colposcopy 5/13/21\par    a) Cervix 9, 12 CIN2/3\par    b) Cervix 6, ECC wnl\par \par \par Here today for follow up. Initially planned LEEP procedure today but patient is hesitant. Feeling well, has questions about HPV vaccination as well as observation versus treatment.  negative...

## 2022-02-25 NOTE — ED PROVIDER NOTE - SEVERE SEPSIS ALERT DETAILS
Dr. Mercedes: Noted elevated WBC and lactate but given presentation more likely an MI over infectious related.

## 2022-02-25 NOTE — ED ADULT NURSE NOTE - OBJECTIVE STATEMENT
pt presenting to room 11 AxOx4 ambulatory at baseline with c/o left sided CP, SOB x several days. PMH loop recorder s/p stroke, HTN, DM. Denies any other cardiac history. Pt states his wife  several days ago due to covid. Pt arriving to room with labored breathing. Pallor/diaphoresis not noted. O2 sat 88% on RA- placed on 2L NC with improvement to 98%. IV established with 20g to RAC and 18g to LAC. Labs drawn and sent. VS as noted. pt sinus tachy on cardiac monitor and placed on zoll. Medications administered as per MD orders. Cardiology at bedside. Pt to be taken to cath lab due to EKG showing STEMI.

## 2022-02-25 NOTE — ED PROVIDER NOTE - OBJECTIVE STATEMENT
57 y/o M w/ hx of HTN and DM p/w CP for 2 days. Acutely worse yesterday. Also endorsing sob. Never smoker. Does not follow up w/ outside cardiologist. EKG showing STEMI. NKDA 57 y/o M w/ hx of HTN DM, TIA on aspirin p/w CP for 2 days. 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 Never smoker. Does not follow up w/ outside cardiologist. EKG showing STEMI. NKDA  Denies recent illness.

## 2022-02-26 LAB
ALBUMIN SERPL ELPH-MCNC: 3.3 G/DL — SIGNIFICANT CHANGE UP (ref 3.3–5)
ALP SERPL-CCNC: 47 U/L — SIGNIFICANT CHANGE UP (ref 40–120)
ALT FLD-CCNC: 52 U/L — HIGH (ref 4–41)
ANION GAP SERPL CALC-SCNC: 11 MMOL/L — SIGNIFICANT CHANGE UP (ref 7–14)
APTT BLD: 47.9 SEC — HIGH (ref 27–36.3)
APTT BLD: 57 SEC — HIGH (ref 27–36.3)
APTT BLD: 64.3 SEC — HIGH (ref 27–36.3)
AST SERPL-CCNC: 162 U/L — HIGH (ref 4–40)
BILIRUB SERPL-MCNC: 0.7 MG/DL — SIGNIFICANT CHANGE UP (ref 0.2–1.2)
BUN SERPL-MCNC: 25 MG/DL — HIGH (ref 7–23)
CALCIUM SERPL-MCNC: 8.1 MG/DL — LOW (ref 8.4–10.5)
CHLORIDE SERPL-SCNC: 98 MMOL/L — SIGNIFICANT CHANGE UP (ref 98–107)
CHOLEST SERPL-MCNC: 171 MG/DL — SIGNIFICANT CHANGE UP
CK MB BLD-MCNC: 2.5 % — SIGNIFICANT CHANGE UP (ref 0–2.5)
CK MB CFR SERPL CALC: 43.3 NG/ML — HIGH
CK SERPL-CCNC: 1706 U/L — HIGH (ref 30–200)
CO2 SERPL-SCNC: 24 MMOL/L — SIGNIFICANT CHANGE UP (ref 22–31)
CREAT SERPL-MCNC: 1.52 MG/DL — HIGH (ref 0.5–1.3)
GLUCOSE BLDC GLUCOMTR-MCNC: 233 MG/DL — HIGH (ref 70–99)
GLUCOSE BLDC GLUCOMTR-MCNC: 285 MG/DL — HIGH (ref 70–99)
GLUCOSE BLDC GLUCOMTR-MCNC: 301 MG/DL — HIGH (ref 70–99)
GLUCOSE BLDC GLUCOMTR-MCNC: 316 MG/DL — HIGH (ref 70–99)
GLUCOSE SERPL-MCNC: 270 MG/DL — HIGH (ref 70–99)
HCT VFR BLD CALC: 32.9 % — LOW (ref 39–50)
HDLC SERPL-MCNC: 82 MG/DL — SIGNIFICANT CHANGE UP
HGB BLD-MCNC: 11.2 G/DL — LOW (ref 13–17)
LEGIONELLA AG UR QL: NEGATIVE — SIGNIFICANT CHANGE UP
LIPID PNL WITH DIRECT LDL SERPL: 78 MG/DL — SIGNIFICANT CHANGE UP
MAGNESIUM SERPL-MCNC: 1.7 MG/DL — SIGNIFICANT CHANGE UP (ref 1.6–2.6)
MCHC RBC-ENTMCNC: 30 PG — SIGNIFICANT CHANGE UP (ref 27–34)
MCHC RBC-ENTMCNC: 34 GM/DL — SIGNIFICANT CHANGE UP (ref 32–36)
MCV RBC AUTO: 88.2 FL — SIGNIFICANT CHANGE UP (ref 80–100)
NON HDL CHOLESTEROL: 89 MG/DL — SIGNIFICANT CHANGE UP
NRBC # BLD: 0 /100 WBCS — SIGNIFICANT CHANGE UP
NRBC # FLD: 0 K/UL — SIGNIFICANT CHANGE UP
PHOSPHATE SERPL-MCNC: 2.7 MG/DL — SIGNIFICANT CHANGE UP (ref 2.5–4.5)
PLATELET # BLD AUTO: 175 K/UL — SIGNIFICANT CHANGE UP (ref 150–400)
POTASSIUM SERPL-MCNC: 3.8 MMOL/L — SIGNIFICANT CHANGE UP (ref 3.5–5.3)
POTASSIUM SERPL-SCNC: 3.8 MMOL/L — SIGNIFICANT CHANGE UP (ref 3.5–5.3)
PROT SERPL-MCNC: 5.8 G/DL — LOW (ref 6–8.3)
RBC # BLD: 3.73 M/UL — LOW (ref 4.2–5.8)
RBC # FLD: 13.6 % — SIGNIFICANT CHANGE UP (ref 10.3–14.5)
SODIUM SERPL-SCNC: 133 MMOL/L — LOW (ref 135–145)
TRIGL SERPL-MCNC: 57 MG/DL — SIGNIFICANT CHANGE UP
TROPONIN T, HIGH SENSITIVITY RESULT: 5591 NG/L — CRITICAL HIGH
WBC # BLD: 11.46 K/UL — HIGH (ref 3.8–10.5)
WBC # FLD AUTO: 11.46 K/UL — HIGH (ref 3.8–10.5)

## 2022-02-26 PROCEDURE — 71045 X-RAY EXAM CHEST 1 VIEW: CPT | Mod: 26

## 2022-02-26 PROCEDURE — 99233 SBSQ HOSP IP/OBS HIGH 50: CPT

## 2022-02-26 RX ORDER — AZITHROMYCIN 500 MG/1
500 TABLET, FILM COATED ORAL DAILY
Refills: 0 | Status: DISCONTINUED | OUTPATIENT
Start: 2022-02-26 | End: 2022-02-27

## 2022-02-26 RX ORDER — FOLIC ACID 0.8 MG
1 TABLET ORAL DAILY
Refills: 0 | Status: DISCONTINUED | OUTPATIENT
Start: 2022-02-27 | End: 2022-03-03

## 2022-02-26 RX ORDER — THIAMINE MONONITRATE (VIT B1) 100 MG
100 TABLET ORAL DAILY
Refills: 0 | Status: COMPLETED | OUTPATIENT
Start: 2022-02-27 | End: 2022-03-01

## 2022-02-26 RX ORDER — METOPROLOL TARTRATE 50 MG
12.5 TABLET ORAL DAILY
Refills: 0 | Status: DISCONTINUED | OUTPATIENT
Start: 2022-02-26 | End: 2022-02-26

## 2022-02-26 RX ORDER — AMLODIPINE BESYLATE 2.5 MG/1
1 TABLET ORAL
Qty: 0 | Refills: 0 | DISCHARGE

## 2022-02-26 RX ORDER — CEFEPIME 1 G/1
1000 INJECTION, POWDER, FOR SOLUTION INTRAMUSCULAR; INTRAVENOUS EVERY 12 HOURS
Refills: 0 | Status: COMPLETED | OUTPATIENT
Start: 2022-02-26 | End: 2022-03-02

## 2022-02-26 RX ORDER — FOLIC ACID 0.8 MG
1 TABLET ORAL
Qty: 0 | Refills: 0 | DISCHARGE

## 2022-02-26 RX ORDER — MAGNESIUM SULFATE 500 MG/ML
2 VIAL (ML) INJECTION ONCE
Refills: 0 | Status: COMPLETED | OUTPATIENT
Start: 2022-02-26 | End: 2022-02-26

## 2022-02-26 RX ORDER — POTASSIUM CHLORIDE 20 MEQ
20 PACKET (EA) ORAL ONCE
Refills: 0 | Status: COMPLETED | OUTPATIENT
Start: 2022-02-26 | End: 2022-02-26

## 2022-02-26 RX ORDER — ATORVASTATIN CALCIUM 80 MG/1
1 TABLET, FILM COATED ORAL
Qty: 0 | Refills: 0 | DISCHARGE

## 2022-02-26 RX ADMIN — PIPERACILLIN AND TAZOBACTAM 25 GRAM(S): 4; .5 INJECTION, POWDER, LYOPHILIZED, FOR SOLUTION INTRAVENOUS at 05:03

## 2022-02-26 RX ADMIN — Medication 8: at 11:41

## 2022-02-26 RX ADMIN — Medication 650 MILLIGRAM(S): at 07:56

## 2022-02-26 RX ADMIN — Medication 650 MILLIGRAM(S): at 18:09

## 2022-02-26 RX ADMIN — ATORVASTATIN CALCIUM 80 MILLIGRAM(S): 80 TABLET, FILM COATED ORAL at 21:21

## 2022-02-26 RX ADMIN — Medication 6: at 07:56

## 2022-02-26 RX ADMIN — Medication 650 MILLIGRAM(S): at 00:06

## 2022-02-26 RX ADMIN — Medication 40 MILLIGRAM(S): at 05:02

## 2022-02-26 RX ADMIN — HEPARIN SODIUM 12 UNIT(S)/HR: 5000 INJECTION INTRAVENOUS; SUBCUTANEOUS at 11:11

## 2022-02-26 RX ADMIN — HEPARIN SODIUM 12 UNIT(S)/HR: 5000 INJECTION INTRAVENOUS; SUBCUTANEOUS at 03:33

## 2022-02-26 RX ADMIN — CEFEPIME 100 MILLIGRAM(S): 1 INJECTION, POWDER, FOR SOLUTION INTRAMUSCULAR; INTRAVENOUS at 17:12

## 2022-02-26 RX ADMIN — HEPARIN SODIUM 12 UNIT(S)/HR: 5000 INJECTION INTRAVENOUS; SUBCUTANEOUS at 19:00

## 2022-02-26 RX ADMIN — CHLORHEXIDINE GLUCONATE 1 APPLICATION(S): 213 SOLUTION TOPICAL at 06:17

## 2022-02-26 RX ADMIN — INSULIN GLARGINE 10 UNIT(S): 100 INJECTION, SOLUTION SUBCUTANEOUS at 21:18

## 2022-02-26 RX ADMIN — Medication 4: at 21:19

## 2022-02-26 RX ADMIN — TICAGRELOR 90 MILLIGRAM(S): 90 TABLET ORAL at 05:02

## 2022-02-26 RX ADMIN — Medication 20 MILLIEQUIVALENT(S): at 05:02

## 2022-02-26 RX ADMIN — Medication 81 MILLIGRAM(S): at 11:10

## 2022-02-26 RX ADMIN — Medication 1 MILLIGRAM(S): at 23:13

## 2022-02-26 RX ADMIN — TICAGRELOR 90 MILLIGRAM(S): 90 TABLET ORAL at 17:12

## 2022-02-26 RX ADMIN — Medication 650 MILLIGRAM(S): at 16:56

## 2022-02-26 RX ADMIN — Medication 650 MILLIGRAM(S): at 08:50

## 2022-02-26 RX ADMIN — Medication 250 MILLIGRAM(S): at 17:12

## 2022-02-26 RX ADMIN — Medication 650 MILLIGRAM(S): at 00:36

## 2022-02-26 RX ADMIN — Medication 25 GRAM(S): at 03:25

## 2022-02-26 RX ADMIN — AZITHROMYCIN 255 MILLIGRAM(S): 500 TABLET, FILM COATED ORAL at 11:10

## 2022-02-26 RX ADMIN — Medication 250 MILLIGRAM(S): at 05:03

## 2022-02-26 RX ADMIN — Medication 4: at 16:53

## 2022-02-26 NOTE — PROGRESS NOTE ADULT - SUBJECTIVE AND OBJECTIVE BOX
Patient is a 56y old  Male who presents with a chief complaint of STEMI (2022 13:25)      INTERVAL HISTORYOVERNIGHT EVENTS:  -No acute events    SUBJECTIVE  - Patient seen and evaluated at bedside  - Patient reports presence of   - Patient reports absence of fevers, chills, HA, lighted headedness, dizziness, nausea, emesis, chest pain, dyspnea, palpitations, abd pain, diarrhea, urinary symptoms, skin color changes or rashes, or LE edema     MEDICATIONS:  MEDICATIONS  (STANDING):  aspirin enteric coated 81 milliGRAM(s) Oral daily  atorvastatin 80 milliGRAM(s) Oral at bedtime  chlorhexidine 4% Liquid 1 Application(s) Topical <User Schedule>  dextrose 40% Gel 15 Gram(s) Oral once  dextrose 5%. 1000 milliLiter(s) (50 mL/Hr) IV Continuous <Continuous>  dextrose 5%. 1000 milliLiter(s) (100 mL/Hr) IV Continuous <Continuous>  dextrose 50% Injectable 25 Gram(s) IV Push once  dextrose 50% Injectable 12.5 Gram(s) IV Push once  dextrose 50% Injectable 25 Gram(s) IV Push once  furosemide   Injectable 40 milliGRAM(s) IV Push daily  glucagon  Injectable 1 milliGRAM(s) IntraMuscular once  heparin  Infusion 1000 Unit(s)/Hr (12 mL/Hr) IV Continuous <Continuous>  insulin glargine Injectable (LANTUS) 10 Unit(s) SubCutaneous at bedtime  insulin lispro (ADMELOG) corrective regimen sliding scale   SubCutaneous three times a day before meals  insulin lispro (ADMELOG) corrective regimen sliding scale   SubCutaneous at bedtime  piperacillin/tazobactam IVPB.. 3.375 Gram(s) IV Intermittent every 8 hours  ticagrelor 90 milliGRAM(s) Oral every 12 hours  vancomycin  IVPB 1000 milliGRAM(s) IV Intermittent every 12 hours    MEDICATIONS  (PRN):  acetaminophen     Tablet .. 650 milliGRAM(s) Oral every 6 hours PRN Temp greater or equal to 38C (100.4F), Moderate Pain (4 - 6)      ALLERGIES:  No Known Allergies    OBJECTIVE:  ICU Vital Signs Last 24 Hrs  T(C): 37.5 (2022 04:00), Max: 39.2 (2022 16:25)  T(F): 99.5 (2022 04:00), Max: 102.5 (2022 16:25)  HR: 82 (2022 06:00) (76 - 110)  BP: 150/107 (2022 10:44) (150/107 - 162/99)  RR: 21 (2022 06:00) (18 - 34)  SpO2: 94% (2022 06:00) (94% - 100%)    POCT Blood Glucose.: 248 mg/dL (2022 21:22)  POCT Blood Glucose.: 141 mg/dL (2022 17:02)  POCT Blood Glucose.: 208 mg/dL (2022 12:56)  POCT Blood Glucose.: 240 mg/dL (2022 10:04)  POCT Blood Glucose.: 248 mg/dL (2022 21:22)    I&O's Summary    2022 07:01  -  2022 06:40  --------------------------------------------------------  IN: 1104 mL / OUT: 1850 mL / NET: -746 mL      Daily Height in cm: 170.18 (2022 12:30)    Daily Weight in k.9 (2022 05:00)    PHYSICAL EXAM:  General: NAD, well-groomed, well-developed  Eyes: Conjunctiva and sclera clear  ENMT: Moist mucous membranes  Neck: No palpable pre-auricular, post-auricular, occipital, mandibular, submental, supra-clavicular, or infra-clavicular lymph nodes   Chest: Clear to auscultation bilaterally; no rales, rhonchi, or wheezing  Heart: Regular rate and rhythm; normal S1 and S2; no murmurs, rubs, or gallops  Abd: Soft, nontender, nondistended  Nervous System: AAOX3  Psych: Appropriate affect   Ext: no peripheral LE edema bilaterally  Lines/Tubes: IV peripheral    LABS:                        11.2   11.46 )-----------( 175      ( 2022 02:19 )             32.9     02-26    133<L>  |  98  |  25<H>  ----------------------------<  270<H>  3.8   |  24  |  1.52<H>    Ca    8.1<L>      2022 02:20  Phos  2.7       Mg     1.70         TPro  5.8<L>  /  Alb  3.3  /  TBili  0.7  /  DBili  x   /  AST  162<H>  /  ALT  52<H>  /  AlkPhos  47      LIVER FUNCTIONS - ( 2022 02:20 )  Alb: 3.3 g/dL / Pro: 5.8 g/dL / ALK PHOS: 47 U/L / ALT: 52 U/L / AST: 162 U/L / GGT: x           PT/INR - ( 2022 10:40 )   PT: 11.9 sec;   INR: 1.03 ratio         PTT - ( 2022 02:20 )  PTT:47.9 sec  CKMB Units: 43.3 ng/mL ( @ 02:20)  Creatine Kinase, Serum: 1706 U/L ( @ 02:20)  Creatine Kinase, Serum: 3078 U/L ( @ 15:05)  CKMB Units: 149.2 ng/mL ( @ 15:05)  CKMB Units: 160.3 ng/mL ( @ 10:40)    CARDIAC MARKERS ( 2022 02:20 )  x     / x     / 1706 U/L / x     / 43.3 ng/mL  CARDIAC MARKERS ( 2022 15:05 )  x     / x     / 3078 U/L / x     / 149.2 ng/mL  CARDIAC MARKERS ( 2022 10:40 )  x     / x     / x     / x     / 160.3 ng/mL      Urinalysis Basic - ( 2022 13:23 )    Color: Colorless / Appearance: Clear / S.019 / pH: x  Gluc: x / Ketone: Negative  / Bili: Negative / Urobili: <2 mg/dL   Blood: x / Protein: Negative / Nitrite: Negative   Leuk Esterase: Negative / RBC: x / WBC x   Sq Epi: x / Non Sq Epi: x / Bacteria: x      RADIOLOGY & ADDITIONAL TESTS:    Consultant(s) Notes Reviewed:  Yes    Care Discussed with Consultants/Other Providers [ x] YES  [ ] NO   Patient is a 56y old  Male who presents with a chief complaint of STEMI (2022 13:25)      INTERVAL HISTORYOVERNIGHT EVENTS:  -No acute events    SUBJECTIVE  - Patient seen and evaluated at bedside  - Patient reports presence of feeling cold  - Patient reports absence of fevers, dizziness, nausea, chest pain, dyspnea, palpitations, abd pain, diarrhea, urinary symptoms, or LE edema     MEDICATIONS:  MEDICATIONS  (STANDING):  aspirin enteric coated 81 milliGRAM(s) Oral daily  atorvastatin 80 milliGRAM(s) Oral at bedtime  chlorhexidine 4% Liquid 1 Application(s) Topical <User Schedule>  dextrose 40% Gel 15 Gram(s) Oral once  dextrose 5%. 1000 milliLiter(s) (50 mL/Hr) IV Continuous <Continuous>  dextrose 5%. 1000 milliLiter(s) (100 mL/Hr) IV Continuous <Continuous>  dextrose 50% Injectable 25 Gram(s) IV Push once  dextrose 50% Injectable 12.5 Gram(s) IV Push once  dextrose 50% Injectable 25 Gram(s) IV Push once  furosemide   Injectable 40 milliGRAM(s) IV Push daily  glucagon  Injectable 1 milliGRAM(s) IntraMuscular once  heparin  Infusion 1000 Unit(s)/Hr (12 mL/Hr) IV Continuous <Continuous>  insulin glargine Injectable (LANTUS) 10 Unit(s) SubCutaneous at bedtime  insulin lispro (ADMELOG) corrective regimen sliding scale   SubCutaneous three times a day before meals  insulin lispro (ADMELOG) corrective regimen sliding scale   SubCutaneous at bedtime  piperacillin/tazobactam IVPB.. 3.375 Gram(s) IV Intermittent every 8 hours  ticagrelor 90 milliGRAM(s) Oral every 12 hours  vancomycin  IVPB 1000 milliGRAM(s) IV Intermittent every 12 hours    MEDICATIONS  (PRN):  acetaminophen     Tablet .. 650 milliGRAM(s) Oral every 6 hours PRN Temp greater or equal to 38C (100.4F), Moderate Pain (4 - 6)      ALLERGIES:  No Known Allergies    OBJECTIVE:  ICU Vital Signs Last 24 Hrs  T(C): 37.5 (2022 04:00), Max: 39.2 (2022 16:25)  T(F): 99.5 (2022 04:00), Max: 102.5 (2022 16:25)  HR: 82 (2022 06:00) (76 - 110)  BP: 150/107 (2022 10:44) (150/107 - 162/99)  RR: 21 (2022 06:00) (18 - 34)  SpO2: 94% (2022 06:00) (94% - 100%)    POCT Blood Glucose.: 248 mg/dL (2022 21:22)  POCT Blood Glucose.: 141 mg/dL (2022 17:02)  POCT Blood Glucose.: 208 mg/dL (2022 12:56)  POCT Blood Glucose.: 240 mg/dL (2022 10:04)  POCT Blood Glucose.: 248 mg/dL (2022 21:22)    I&O's Summary    2022 07:01  -  2022 06:40  --------------------------------------------------------  IN: 1104 mL / OUT: 1850 mL / NET: -746 mL      Daily Height in cm: 170.18 (2022 12:30)    Daily Weight in k.9 (2022 05:00)    PHYSICAL EXAM:  GENERAL: NAD  HEAD:  Atraumatic, Normocephalic  EYES: EOMI, Sclera clear  ENT: Moist mucous membranes  NECK: Supple, No JVD; no palpable pre-auricular, post-auricular, occipital, mandibular, submental, supra-clavicular, or infra-clavicular lymph nodes   CHEST/LUNG: Clear to auscultation bilaterally; No rales, rhonchi, wheezing, or rubs. Unlabored respirations  HEART: Regular rate and rhythm; could not fully discern murmurs w. intraballoon pump in place  ABDOMEN: Soft, Nontender to light and deep palpation, Nondistended but obese abdomen  EXTREMITIES: 2+ Peripheral radial pulses, brisk capillary refill. No clubbing, cyanosis, or bilateral LE edema; R groin without signs of infection or hematoma   NERVOUS SYSTEM: Alert & Oriented x3, speech clear  SKIN: No rashes or lesions    LABS:                        11.2   11.46 )-----------( 175      ( 2022 02:19 )             32.9         133<L>  |  98  |  25<H>  ----------------------------<  270<H>  3.8   |  24  |  1.52<H>    Ca    8.1<L>      2022 02:20  Phos  2.7       Mg     1.70         TPro  5.8<L>  /  Alb  3.3  /  TBili  0.7  /  DBili  x   /  AST  162<H>  /  ALT  52<H>  /  AlkPhos  47      LIVER FUNCTIONS - ( 2022 02:20 )  Alb: 3.3 g/dL / Pro: 5.8 g/dL / ALK PHOS: 47 U/L / ALT: 52 U/L / AST: 162 U/L / GGT: x           PT/INR - ( 2022 10:40 )   PT: 11.9 sec;   INR: 1.03 ratio         PTT - ( 2022 02:20 )  PTT:47.9 sec  CKMB Units: 43.3 ng/mL ( @ 02:20)  Creatine Kinase, Serum: 1706 U/L ( @ 02:20)  Creatine Kinase, Serum: 3078 U/L ( @ 15:05)  CKMB Units: 149.2 ng/mL ( @ 15:05)  CKMB Units: 160.3 ng/mL ( @ 10:40)    CARDIAC MARKERS ( 2022 02:20 )  x     / x     / 1706 U/L / x     / 43.3 ng/mL  CARDIAC MARKERS ( 2022 15:05 )  x     / x     / 3078 U/L / x     / 149.2 ng/mL  CARDIAC MARKERS ( 2022 10:40 )  x     / x     / x     / x     / 160.3 ng/mL      Urinalysis Basic - ( 2022 13:23 )    Color: Colorless / Appearance: Clear / S.019 / pH: x  Gluc: x / Ketone: Negative  / Bili: Negative / Urobili: <2 mg/dL   Blood: x / Protein: Negative / Nitrite: Negative   Leuk Esterase: Negative / RBC: x / WBC x   Sq Epi: x / Non Sq Epi: x / Bacteria: x      RADIOLOGY & ADDITIONAL TESTS:    Consultant(s) Notes Reviewed:  Yes    Care Discussed with Consultants/Other Providers [ x] YES  [ ] NO

## 2022-02-26 NOTE — PROGRESS NOTE ADULT - ASSESSMENT
55 y M with a hx of alcohol use, HTN, uncontrolled DM, hospitalization for alcohol withdrawal; TIA on aspirin presented with acute onset of typical angina and found to have an inferolateral STEMI w. 100% occlusion both proximal LCxs and distal OM /p DESx1. Admitted to CCU for further management.     NEURO:  -Currently A&Ox3  -No active issues    CV:  #Typical angina w. inferolateral wall STEMI (+trops, +EKG changes)  - S/p DESx1 stent to proximal LCx  -TTE indicates severe anterolateral hypokinesis, severe apical hypokinesis, and diaphragmatic akinesis. EF 35%  - On heparin gtt for intraballoon aortic pump; ordered CXR for morning to assess balloon placement     - DAPT: ASA 81 & ticagrelor 90mg BID for at least 1 year  - Hold off B-blockers and reassess for tomorrow   - Hold off ACE-I will introduce as tolerated once patient is on BB's and renal function with appropriate function  - Started atorvastatin 80mg QD  - Trend troponin to peak    PULM:  -WBC elevated w. coughing and subjective fevers that started yesterday and now shivering  -CXR appears to have consolidation in left lung field but questionable; pending finalized recs    RENAL:  -SCr 1.18 with unknown baseline  -unremarkable electrolyte derangements  -trend BMP  -euvolemic on exam; monitor urine output strict i&    GI:  #Transaminitis: likely in setting of recent inferolateral MI and EtOH use history  -Trend LFT's  -DASH/TLC carbs consistent diet     ENDO:  #DM2: HbA1c   - Low Insulin Sliding Scale    HEMATOLOGIC:  #mild normochromic normocytic anemia in setting of recent inferolateral STEMI Hgb 12.9  -monitor for now    #DVT prophylaxis  -on heparin and DAPT    ID:  #SIRS+ w. leukocytosis and fever 101.6F i.s.o inferolateral STEMI  -WBC 15.80 from 14.3  MI and/or possible infection  -CXR appears to have probable pneumonia but finalized read pending   -Patient is shivering and had coughing yesterday and subjective fevers  -UA unremarkable; f.u BCx pending   -plan to start broad spectrum abx    SKIN:  #Lines/tubes: IV peripheral access and intraballoon aortic pump    Dispo:  -pending clinical improvement 55 y M with a hx of alcohol use, HTN, uncontrolled DM, hospitalization for alcohol withdrawal; TIA on aspirin presented with acute onset of typical angina and found to have an inferolateral STEMI w. 100% occlusion both proximal LCxs and distal OM /p DESx1. Admitted to CCU for further management.     NEURO:  -Currently A&Ox3  -No active issues    CV:  #Typical angina w. inferolateral wall STEMI (+trops, +EKG changes)  - S/p DESx1 stent to proximal LCx  -TTE indicates severe anterolateral hypokinesis, severe apical hypokinesis, and diaphragmatic akinesis. EF 35%  - On heparin gtt for intraballoon aortic pump; ordered CXR for morning to assess balloon placement     - DAPT: ASA 81 & ticagrelor 90mg BID for at least 1 year  - Hold off B-blockers and reassess for tomorrow   - Hold off ACE-I will introduce as tolerated once patient is on BB's and renal function with appropriate function  - Started atorvastatin 80mg QD  - Trend troponin to peak    PULM:  -WBC elevated w. coughing and subjective fevers that started yesterday and now shivering  -CXR appears unremarkable for pneumonia    RENAL:  #HOA likely prerenal i.s.o recent MI  -SCr 1.52 from 1.18 with unknown baseline  -unremarkable electrolyte derangements  -trend BMP  -euvolemic on exam; strict I&O's; monitor urine output       GI:  #Transaminitis: likely in setting of recent inferolateral MI and EtOH use history  -Trend LFT's  -DASH/TLC carbs consistent diet     ENDO:  #DM2: HbA1c   - Low Insulin Sliding Scale    HEMATOLOGIC:  #mild normochromic normocytic anemia in setting of recent inferolateral STEMI Hgb 12.9  -monitor for now    #DVT prophylaxis  -on heparin and DAPT    ID:  #SIRS+ w. leukocytosis (~15) and fever 101.6F i.s.o inferolateral STEMI  -downtrending WBC 11.5 from 12.6 likely MI and/or possible infection  -CXR appears unremarkable   -UA unremarkable; f.u BCx pending   -Cont. broad spectrum abx    SKIN:  #Lines/tubes: IV peripheral access and intraballoon aortic pump    Dispo:  -pending clinical improvement 55 y M with a hx of alcohol use, HTN, uncontrolled DM, hospitalization for alcohol withdrawal; TIA on aspirin presented with acute onset of typical angina and found to have an inferolateral STEMI w. 100% occlusion both proximal LCxs and distal OM /p DESx1. Admitted to CCU for further management.     NEURO:  -Currently A&Ox3  -No active issues    CV:  #Typical angina w. inferolateral wall STEMI (+trops, +EKG changes)  - S/p DESx1 stent to proximal LCx  -TTE indicates severe anterolateral hypokinesis, severe apical hypokinesis, and diaphragmatic akinesis. EF 35%  - On heparin gtt for intraballoon aortic pump; ordered CXR for morning to assess balloon placement     - DAPT: ASA 81 & ticagrelor 90mg BID for at least 1 year  - Hold off B-blockers and reassess for tomorrow   - Hold off ACE-I will introduce as tolerated once patient is on BB's and renal function with appropriate function  - Started atorvastatin 80mg QD  - Trend troponin to peak    PULM:  -WBC elevated w. coughing and subjective fevers that started yesterday and now shivering  -CXR appears unremarkable for pneumonia    RENAL:  #HOA likely prerenal i.s.o recent MI  -SCr 1.52 from 1.18 with unknown baseline  -unremarkable electrolyte derangements  -trend BMP  -euvolemic on exam; strict I&O's; monitor urine output     GI:  #Transaminitis: likely in setting of recent inferolateral MI and EtOH use history  -Trend LFT's  -DASH/TLC carbs consistent diet     ENDO:  #DM2: HbA1c   - Low Insulin Sliding Scale    HEMATOLOGIC:  #mild normochromic normocytic anemia in setting of recent inferolateral STEMI Hgb 12.9  -monitor for now    #DVT prophylaxis  -on heparin and DAPT    ID:  #SIRS+ w. leukocytosis (~15) and fever 101.6F i.s.o inferolateral STEMI  -downtrending WBC 11.5 from 12.6 likely MI and/or possible infection  -CXR appears unremarkable   -UA unremarkable; f.u BCx pending   -Cont. broad spectrum abx    SKIN:  #Lines/tubes: IV peripheral access and intraballoon aortic pump    Dispo:  -pending clinical improvement 55 y M with a hx of alcohol use, HTN, uncontrolled DM, hospitalization for alcohol withdrawal; TIA on aspirin presented with acute onset of typical angina and found to have an inferolateral STEMI w. 100% occlusion both proximal LCxs and distal OM /p DESx1. Admitted to CCU for further management.     NEURO:  -Currently A&Ox3  -No active issues    CV:  #Typical angina w. inferolateral wall STEMI (+trops, +EKG changes)  - S/p DESx1 stent to proximal LCx  -TTE indicates severe anterolateral hypokinesis, severe apical hypokinesis, and diaphragmatic akinesis. EF 35%  - On heparin gtt for intraballoon aortic pump; ordered CXR for morning to assess balloon placement     - DAPT: ASA 81 & ticagrelor 90mg BID for at least 1 year  - Hold off B-blockers and reassess tomorrow   - Hold off ACE-I but will introduce as tolerated once patient is on BB's and renal function with appropriate function  - Cont w. atorvastatin 80mg QD  - troponins peaked to >10,000 but downtrending to ~5500    PULM:  -WBC elevated initially had. coughing prior to admission and subjective fevers as well; shivering upon CCU admission  -CXR appears unremarkable for pneumonia    RENAL:  #HOA likely prerenal i.s.o recent MI vs infection vs medications  -SCr 1.52 from 1.18 with unknown baseline  -unremarkable electrolyte derangements  -trend SCr; avoid nephrotoxic meds and renally dose accordingly if need be  -euvolemic on exam; strict I&O's; monitor urine output     GI:  #Transaminitis: likely in setting of recent inferolateral MI and EtOH use history  -Trend LFT's  -DASH/TLC carbs consistent diet     ENDO:  #DM2: HbA1c   - Low Insulin Sliding Scale    HEMATOLOGIC:  #mild normochromic normocytic anemia in setting of recent inferolateral STEMI Hgb 12.9  -monitor for now    #DVT prophylaxis  -on heparin and DAPT    ID:  #SIRS+ w. leukocytosis (~15) and fever 101.6F i.s.o inferolateral STEMI  -downtrending WBC 11.5 from 12.6 likely MI and/or possible infection  -CXR appears unremarkable  -UA unremarkable; f.u BCx pending; ordered urine legionella ag   -Cont w. vancomycin (2/25- ); dc-ed Zosyn (2/25-2/26); added cefepime and azithromycin as broader empiric coverage    -f.u vanc trough     SKIN:  #Lines/tubes: IV peripheral access and intraballoon aortic pump    Dispo:  -pending clinical improvement

## 2022-02-27 LAB
ALBUMIN SERPL ELPH-MCNC: 3.2 G/DL — LOW (ref 3.3–5)
ALP SERPL-CCNC: 50 U/L — SIGNIFICANT CHANGE UP (ref 40–120)
ALT FLD-CCNC: 40 U/L — SIGNIFICANT CHANGE UP (ref 4–41)
ANION GAP SERPL CALC-SCNC: 10 MMOL/L — SIGNIFICANT CHANGE UP (ref 7–14)
APTT BLD: 47.5 SEC — HIGH (ref 27–36.3)
APTT BLD: 53.3 SEC — HIGH (ref 27–36.3)
APTT BLD: 54.4 SEC — HIGH (ref 27–36.3)
AST SERPL-CCNC: 66 U/L — HIGH (ref 4–40)
BILIRUB SERPL-MCNC: 0.6 MG/DL — SIGNIFICANT CHANGE UP (ref 0.2–1.2)
BUN SERPL-MCNC: 22 MG/DL — SIGNIFICANT CHANGE UP (ref 7–23)
CALCIUM SERPL-MCNC: 8.4 MG/DL — SIGNIFICANT CHANGE UP (ref 8.4–10.5)
CHLORIDE SERPL-SCNC: 97 MMOL/L — LOW (ref 98–107)
CK MB BLD-MCNC: 1.6 % — SIGNIFICANT CHANGE UP (ref 0–2.5)
CK MB CFR SERPL CALC: 11.3 NG/ML — HIGH
CK SERPL-CCNC: 698 U/L — HIGH (ref 30–200)
CO2 SERPL-SCNC: 24 MMOL/L — SIGNIFICANT CHANGE UP (ref 22–31)
CREAT SERPL-MCNC: 1.24 MG/DL — SIGNIFICANT CHANGE UP (ref 0.5–1.3)
GLUCOSE BLDC GLUCOMTR-MCNC: 236 MG/DL — HIGH (ref 70–99)
GLUCOSE BLDC GLUCOMTR-MCNC: 263 MG/DL — HIGH (ref 70–99)
GLUCOSE BLDC GLUCOMTR-MCNC: 264 MG/DL — HIGH (ref 70–99)
GLUCOSE BLDC GLUCOMTR-MCNC: 264 MG/DL — HIGH (ref 70–99)
GLUCOSE SERPL-MCNC: 242 MG/DL — HIGH (ref 70–99)
HCT VFR BLD CALC: 33.8 % — LOW (ref 39–50)
HGB BLD-MCNC: 11.1 G/DL — LOW (ref 13–17)
MAGNESIUM SERPL-MCNC: 2.3 MG/DL — SIGNIFICANT CHANGE UP (ref 1.6–2.6)
MCHC RBC-ENTMCNC: 29.5 PG — SIGNIFICANT CHANGE UP (ref 27–34)
MCHC RBC-ENTMCNC: 32.8 GM/DL — SIGNIFICANT CHANGE UP (ref 32–36)
MCV RBC AUTO: 89.9 FL — SIGNIFICANT CHANGE UP (ref 80–100)
NRBC # BLD: 0 /100 WBCS — SIGNIFICANT CHANGE UP
NRBC # FLD: 0 K/UL — SIGNIFICANT CHANGE UP
PHOSPHATE SERPL-MCNC: 2.8 MG/DL — SIGNIFICANT CHANGE UP (ref 2.5–4.5)
PLATELET # BLD AUTO: 156 K/UL — SIGNIFICANT CHANGE UP (ref 150–400)
POTASSIUM SERPL-MCNC: 4 MMOL/L — SIGNIFICANT CHANGE UP (ref 3.5–5.3)
POTASSIUM SERPL-SCNC: 4 MMOL/L — SIGNIFICANT CHANGE UP (ref 3.5–5.3)
PROT SERPL-MCNC: 6.1 G/DL — SIGNIFICANT CHANGE UP (ref 6–8.3)
RBC # BLD: 3.76 M/UL — LOW (ref 4.2–5.8)
RBC # FLD: 13.2 % — SIGNIFICANT CHANGE UP (ref 10.3–14.5)
SODIUM SERPL-SCNC: 131 MMOL/L — LOW (ref 135–145)
TROPONIN T, HIGH SENSITIVITY RESULT: 4453 NG/L — CRITICAL HIGH
VANCOMYCIN TROUGH SERPL-MCNC: 10.8 UG/ML — SIGNIFICANT CHANGE UP (ref 10–20)
WBC # BLD: 9.28 K/UL — SIGNIFICANT CHANGE UP (ref 3.8–10.5)
WBC # FLD AUTO: 9.28 K/UL — SIGNIFICANT CHANGE UP (ref 3.8–10.5)

## 2022-02-27 PROCEDURE — 71045 X-RAY EXAM CHEST 1 VIEW: CPT | Mod: 26

## 2022-02-27 PROCEDURE — 99233 SBSQ HOSP IP/OBS HIGH 50: CPT

## 2022-02-27 RX ORDER — INSULIN GLARGINE 100 [IU]/ML
12 INJECTION, SOLUTION SUBCUTANEOUS AT BEDTIME
Refills: 0 | Status: DISCONTINUED | OUTPATIENT
Start: 2022-02-27 | End: 2022-02-28

## 2022-02-27 RX ORDER — LOSARTAN POTASSIUM 100 MG/1
12.5 TABLET, FILM COATED ORAL DAILY
Refills: 0 | Status: DISCONTINUED | OUTPATIENT
Start: 2022-02-27 | End: 2022-02-28

## 2022-02-27 RX ADMIN — HEPARIN SODIUM 12.5 UNIT(S)/HR: 5000 INJECTION INTRAVENOUS; SUBCUTANEOUS at 05:10

## 2022-02-27 RX ADMIN — CEFEPIME 100 MILLIGRAM(S): 1 INJECTION, POWDER, FOR SOLUTION INTRAMUSCULAR; INTRAVENOUS at 05:16

## 2022-02-27 RX ADMIN — HEPARIN SODIUM 12.5 UNIT(S)/HR: 5000 INJECTION INTRAVENOUS; SUBCUTANEOUS at 07:38

## 2022-02-27 RX ADMIN — TICAGRELOR 90 MILLIGRAM(S): 90 TABLET ORAL at 17:03

## 2022-02-27 RX ADMIN — Medication 6: at 07:39

## 2022-02-27 RX ADMIN — CHLORHEXIDINE GLUCONATE 1 APPLICATION(S): 213 SOLUTION TOPICAL at 05:59

## 2022-02-27 RX ADMIN — Medication 650 MILLIGRAM(S): at 12:37

## 2022-02-27 RX ADMIN — HEPARIN SODIUM 14.5 UNIT(S)/HR: 5000 INJECTION INTRAVENOUS; SUBCUTANEOUS at 19:32

## 2022-02-27 RX ADMIN — Medication 650 MILLIGRAM(S): at 11:27

## 2022-02-27 RX ADMIN — TICAGRELOR 90 MILLIGRAM(S): 90 TABLET ORAL at 05:16

## 2022-02-27 RX ADMIN — Medication 650 MILLIGRAM(S): at 00:41

## 2022-02-27 RX ADMIN — Medication 2: at 21:27

## 2022-02-27 RX ADMIN — Medication 1 TABLET(S): at 11:28

## 2022-02-27 RX ADMIN — Medication 250 MILLIGRAM(S): at 05:59

## 2022-02-27 RX ADMIN — Medication 4: at 17:04

## 2022-02-27 RX ADMIN — Medication 650 MILLIGRAM(S): at 20:20

## 2022-02-27 RX ADMIN — Medication 100 MILLIGRAM(S): at 11:28

## 2022-02-27 RX ADMIN — HEPARIN SODIUM 15.5 UNIT(S)/HR: 5000 INJECTION INTRAVENOUS; SUBCUTANEOUS at 20:20

## 2022-02-27 RX ADMIN — Medication 1 MILLIGRAM(S): at 21:26

## 2022-02-27 RX ADMIN — CEFEPIME 100 MILLIGRAM(S): 1 INJECTION, POWDER, FOR SOLUTION INTRAMUSCULAR; INTRAVENOUS at 17:03

## 2022-02-27 RX ADMIN — Medication 650 MILLIGRAM(S): at 00:00

## 2022-02-27 RX ADMIN — LOSARTAN POTASSIUM 12.5 MILLIGRAM(S): 100 TABLET, FILM COATED ORAL at 11:27

## 2022-02-27 RX ADMIN — Medication 250 MILLIGRAM(S): at 17:03

## 2022-02-27 RX ADMIN — INSULIN GLARGINE 12 UNIT(S): 100 INJECTION, SOLUTION SUBCUTANEOUS at 21:26

## 2022-02-27 RX ADMIN — Medication 6: at 11:28

## 2022-02-27 RX ADMIN — Medication 81 MILLIGRAM(S): at 11:28

## 2022-02-27 RX ADMIN — Medication 1 MILLIGRAM(S): at 11:28

## 2022-02-27 RX ADMIN — ATORVASTATIN CALCIUM 80 MILLIGRAM(S): 80 TABLET, FILM COATED ORAL at 21:26

## 2022-02-27 RX ADMIN — Medication 650 MILLIGRAM(S): at 19:47

## 2022-02-27 NOTE — PROGRESS NOTE ADULT - SUBJECTIVE AND OBJECTIVE BOX
Patient is a 56y old  Male who presents with a chief complaint of STEMI (2022 06:40)    HPI:  54 yo M with a hx of alcohol use, HTN, uncontrolled DM, hospitalization for alcohol withdrawal; TIA on aspirin presented w. acute onset of exertional pressure-like 10/10 chest pain w. radiating and associated with sweats and shortness of breath that started yesterday and persisted constantly throughout the night. Patient says he tried Advil and rest with mild alleviation. 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 Yesterday, he also experienced subjective fever and dry coughing. However, patient reports absence of headache, sudden weakness, dizziness, lightheadedness, abdominal pain, urinary symptoms, GI symptoms, or LE edema. His last EtOH drink was ~2 weeks ago.    CCU Course: Patient was febrile to 101.6F with shivers but otherwise asymptomatic; UA negative; Blood cultures collected. Hemodynamically stable and A&Ox4 and saturating within normal range on room air.     (2022 13:25)       INTERVAL HPI/OVERNIGHT EVENTS:   No overnight events   Afebrile, hemodynamically stable     Subjective:    ICU Vital Signs Last 24 Hrs  T(C): 37.7 (2022 08:00), Max: 39.2 (2022 16:42)  T(F): 99.9 (2022 08:00), Max: 102.5 (2022 16:42)  HR: 90 (2022 08:00) (74 - 95)  BP: --  BP(mean): --  ABP: --  ABP(mean): --  RR: 27 (2022 08:00) (18 - 29)  SpO2: 95% (2022 08:00) (90% - 99%)    I&O's Summary    2022 07:01  -  2022 07:00  --------------------------------------------------------  IN: 1777.5 mL / OUT: 1950 mL / NET: -172.5 mL    2022 07:01  -  2022 08:13  --------------------------------------------------------  IN: 12.5 mL / OUT: 0 mL / NET: 12.5 mL    Daily     Daily Weight in k.7 (2022 00:00)    Adult Advanced Hemodynamics Last 24 Hrs  CVP(mm Hg): --  CVP(cm H2O): --  CO: --  CI: --  PA: --  PA(mean): --  PCWP: --  SVR: --  SVRI: --  PVR: --  PVRI: --    EKG/Telemetry Events: sinus rhythm    MEDICATIONS  (STANDING):  aspirin enteric coated 81 milliGRAM(s) Oral daily  atorvastatin 80 milliGRAM(s) Oral at bedtime  azithromycin  IVPB 500 milliGRAM(s) IV Intermittent daily  cefepime   IVPB 1000 milliGRAM(s) IV Intermittent every 12 hours  chlorhexidine 4% Liquid 1 Application(s) Topical <User Schedule>  dextrose 40% Gel 15 Gram(s) Oral once  dextrose 5%. 1000 milliLiter(s) (50 mL/Hr) IV Continuous <Continuous>  dextrose 5%. 1000 milliLiter(s) (100 mL/Hr) IV Continuous <Continuous>  dextrose 50% Injectable 25 Gram(s) IV Push once  dextrose 50% Injectable 12.5 Gram(s) IV Push once  dextrose 50% Injectable 25 Gram(s) IV Push once  folic acid 1 milliGRAM(s) Oral daily  glucagon  Injectable 1 milliGRAM(s) IntraMuscular once  heparin  Infusion 1000 Unit(s)/Hr (12.5 mL/Hr) IV Continuous <Continuous>  insulin glargine Injectable (LANTUS) 10 Unit(s) SubCutaneous at bedtime  insulin lispro (ADMELOG) corrective regimen sliding scale   SubCutaneous three times a day before meals  insulin lispro (ADMELOG) corrective regimen sliding scale   SubCutaneous at bedtime  multivitamin 1 Tablet(s) Oral daily  thiamine 100 milliGRAM(s) Oral daily  ticagrelor 90 milliGRAM(s) Oral every 12 hours  vancomycin  IVPB 1000 milliGRAM(s) IV Intermittent every 12 hours    MEDICATIONS  (PRN):  acetaminophen     Tablet .. 650 milliGRAM(s) Oral every 6 hours PRN Temp greater or equal to 38C (100.4F), Moderate Pain (4 - 6)    PHYSICAL EXAM:  GENERAL: NAD  HEAD:  Atraumatic, Normocephalic  EYES: EOMI, Sclera clear  ENT: Moist mucous membranes  NECK: Supple, No JVD; no palpable pre-auricular, post-auricular, occipital, mandibular, submental, supra-clavicular, or infra-clavicular lymph nodes   CHEST/LUNG: Clear to auscultation bilaterally; No rales, rhonchi, wheezing, or rubs. Unlabored respirations  HEART: Regular rate and rhythm; could not fully discern murmurs w. intraballoon pump in place  ABDOMEN: Soft, Nontender to light and deep palpation, Nondistended but obese abdomen  EXTREMITIES: 2+ Peripheral radial pulses, brisk capillary refill. No clubbing, cyanosis, or bilateral LE edema; R groin without signs of infection or hematoma   NERVOUS SYSTEM: Alert & Oriented x3, speech clear  SKIN: No rashes or lesions    LABS:                        11.1   9.28  )-----------( 156      ( 2022 04:07 )             33.8         131<L>  |  97<L>  |  22  ----------------------------<  242<H>  4.0   |  24  |  1.24    Ca    8.4      2022 04:07  Phos  2.8       Mg     2.30         TPro  6.1  /  Alb  3.2<L>  /  TBili  0.6  /  DBili  x   /  AST  66<H>  /  ALT  40  /  AlkPhos  50      LIVER FUNCTIONS - ( 2022 04:07 )  Alb: 3.2 g/dL / Pro: 6.1 g/dL / ALK PHOS: 50 U/L / ALT: 40 U/L / AST: 66 U/L / GGT: x           PT/INR - ( 2022 10:40 )   PT: 11.9 sec;   INR: 1.03 ratio      PTT - ( 2022 04:07 )  PTT:53.3 sec    CAPILLARY BLOOD GLUCOSE  POCT Blood Glucose.: 264 mg/dL (2022 07:30)  POCT Blood Glucose.: 316 mg/dL (2022 21:15)  POCT Blood Glucose.: 233 mg/dL (2022 16:48)  POCT Blood Glucose.: 301 mg/dL (2022 11:27)      CKMB Units: 11.3 ng/mL ( @ 04:07)  Creatine Kinase, Serum: 698 U/L ( @ 04:07)    CARDIAC MARKERS ( 2022 04:07 )  x     / x     / 698 U/L / x     / 11.3 ng/mL  CARDIAC MARKERS ( 2022 02:20 )  x     / x     / 1706 U/L / x     / 43.3 ng/mL  CARDIAC MARKERS ( 2022 15:05 )  x     / x     / 3078 U/L / x     / 149.2 ng/mL  CARDIAC MARKERS ( 2022 10:40 )  x     / x     / x     / x     / 160.3 ng/mL      Urinalysis Basic - ( 2022 13:23 )    Color: Colorless / Appearance: Clear / S.019 / pH: x  Gluc: x / Ketone: Negative  / Bili: Negative / Urobili: <2 mg/dL   Blood: x / Protein: Negative / Nitrite: Negative   Leuk Esterase: Negative / RBC: x / WBC x   Sq Epi: x / Non Sq Epi: x / Bacteria: x      RADIOLOGY & ADDITIONAL TESTS:      Care Discussed with Consultants/Other Providers [ x] YES  [ ] NO           Patient is a 56y old  Male who presents with a chief complaint of STEMI (2022 06:40)    HPI:  54 yo M with a hx of alcohol use, HTN, uncontrolled DM, hospitalization for alcohol withdrawal; TIA on aspirin presented w. acute onset of exertional pressure-like 10/10 chest pain w. radiating and associated with sweats and shortness of breath that started yesterday and persisted constantly throughout the night. Patient says he tried Advil and rest with mild alleviation. 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 Yesterday, he also experienced subjective fever and dry coughing. However, patient reports absence of headache, sudden weakness, dizziness, lightheadedness, abdominal pain, urinary symptoms, GI symptoms, or LE edema. His last EtOH drink was ~2 weeks ago.    CCU Course: Patient was febrile to 101.6F with shivers but otherwise asymptomatic; UA negative; Blood cultures collected. Hemodynamically stable and A&Ox4 and saturating within normal range on room air.     (2022 13:25)       INTERVAL HPI/OVERNIGHT EVENTS:   No fever overnight, howver, diaphoretic s/p 1mg PO Ativan. On CIWA protocol  Afebrile, hemodynamically stable     Subjective:  Pt feeling weak but better compared to yesterday. Also, endorsing mild sob. Denies f/c, cough, cp, palpitations, n/v, abdominal pain.       ICU Vital Signs Last 24 Hrs  T(C): 37.7 (2022 08:00), Max: 39.2 (2022 16:42)  T(F): 99.9 (2022 08:00), Max: 102.5 (2022 16:42)  HR: 90 (2022 08:00) (74 - 95)  BP: --  BP(mean): --  ABP: --  ABP(mean): --  RR: 27 (2022 08:00) (18 - 29)  SpO2: 95% (2022 08:00) (90% - 99%)    I&O's Summary    2022 07:01  -  2022 07:00  --------------------------------------------------------  IN: 1777.5 mL / OUT: 1950 mL / NET: -172.5 mL    2022 07:01  -  2022 08:13  --------------------------------------------------------  IN: 12.5 mL / OUT: 0 mL / NET: 12.5 mL    Daily     Daily Weight in k.7 (2022 00:00)    Adult Advanced Hemodynamics Last 24 Hrs  CVP(mm Hg): --  CVP(cm H2O): --  CO: --  CI: --  PA: --  PA(mean): --  PCWP: --  SVR: --  SVRI: --  PVR: --  PVRI: --    EKG/Telemetry Events: sinus rhythm    MEDICATIONS  (STANDING):  aspirin enteric coated 81 milliGRAM(s) Oral daily  atorvastatin 80 milliGRAM(s) Oral at bedtime  azithromycin  IVPB 500 milliGRAM(s) IV Intermittent daily  cefepime   IVPB 1000 milliGRAM(s) IV Intermittent every 12 hours  chlorhexidine 4% Liquid 1 Application(s) Topical <User Schedule>  dextrose 40% Gel 15 Gram(s) Oral once  dextrose 5%. 1000 milliLiter(s) (50 mL/Hr) IV Continuous <Continuous>  dextrose 5%. 1000 milliLiter(s) (100 mL/Hr) IV Continuous <Continuous>  dextrose 50% Injectable 25 Gram(s) IV Push once  dextrose 50% Injectable 12.5 Gram(s) IV Push once  dextrose 50% Injectable 25 Gram(s) IV Push once  folic acid 1 milliGRAM(s) Oral daily  glucagon  Injectable 1 milliGRAM(s) IntraMuscular once  heparin  Infusion 1000 Unit(s)/Hr (12.5 mL/Hr) IV Continuous <Continuous>  insulin glargine Injectable (LANTUS) 10 Unit(s) SubCutaneous at bedtime  insulin lispro (ADMELOG) corrective regimen sliding scale   SubCutaneous three times a day before meals  insulin lispro (ADMELOG) corrective regimen sliding scale   SubCutaneous at bedtime  multivitamin 1 Tablet(s) Oral daily  thiamine 100 milliGRAM(s) Oral daily  ticagrelor 90 milliGRAM(s) Oral every 12 hours  vancomycin  IVPB 1000 milliGRAM(s) IV Intermittent every 12 hours    MEDICATIONS  (PRN):  acetaminophen     Tablet .. 650 milliGRAM(s) Oral every 6 hours PRN Temp greater or equal to 38C (100.4F), Moderate Pain (4 - 6)    PHYSICAL EXAM:  GENERAL: toxic-appearing male in mild distress  HEAD: Atraumatic, Normocephalic  EYES: conjunctiva and sclera clear  ENT: Moist mucous membranes  NECK: Supple, No JVD; no palpable pre-auricular, post-auricular, occipital, mandibular, submental, supra-clavicular, or infra-clavicular lymph nodes   CHEST/LUNG: CTAB; No rales, rhonchi, wheezing, or rubs. Unlabored respirations  HEART: RRR; nl S1 and S2; currently w/ IABP  ABDOMEN: Soft, Nontender to light and deep palpation, Nondistended but obese abdomen  EXTREMITIES: 2+ Peripheral radial pulses, brisk capillary refill. No clubbing, cyanosis, or bilateral LE edema; R groin without signs of infection or hematoma   NERVOUS SYSTEM: Alert & Oriented x3, speech clear  SKIN: No rashes or lesions    LABS:                        11.1   9.28  )-----------( 156      ( 2022 04:07 )             33.8         131<L>  |  97<L>  |  22  ----------------------------<  242<H>  4.0   |  24  |  1.24    Ca    8.4      2022 04:07  Phos  2.8       Mg     2.30         TPro  6.1  /  Alb  3.2<L>  /  TBili  0.6  /  DBili  x   /  AST  66<H>  /  ALT  40  /  AlkPhos  50      LIVER FUNCTIONS - ( 2022 04:07 )  Alb: 3.2 g/dL / Pro: 6.1 g/dL / ALK PHOS: 50 U/L / ALT: 40 U/L / AST: 66 U/L / GGT: x           PT/INR - ( 2022 10:40 )   PT: 11.9 sec;   INR: 1.03 ratio      PTT - ( 2022 04:07 )  PTT:53.3 sec    CAPILLARY BLOOD GLUCOSE  POCT Blood Glucose.: 264 mg/dL (2022 07:30)  POCT Blood Glucose.: 316 mg/dL (2022 21:15)  POCT Blood Glucose.: 233 mg/dL (2022 16:48)  POCT Blood Glucose.: 301 mg/dL (2022 11:27)      CKMB Units: 11.3 ng/mL ( @ 04:07)  Creatine Kinase, Serum: 698 U/L ( @ 04:07)    CARDIAC MARKERS ( 2022 04:07 )  x     / x     / 698 U/L / x     / 11.3 ng/mL  CARDIAC MARKERS ( 2022 02:20 )  x     / x     / 1706 U/L / x     / 43.3 ng/mL  CARDIAC MARKERS ( 2022 15:05 )  x     / x     / 3078 U/L / x     / 149.2 ng/mL  CARDIAC MARKERS ( 2022 10:40 )  x     / x     / x     / x     / 160.3 ng/mL      Urinalysis Basic - ( 2022 13:23 )    Color: Colorless / Appearance: Clear / S.019 / pH: x  Gluc: x / Ketone: Negative  / Bili: Negative / Urobili: <2 mg/dL   Blood: x / Protein: Negative / Nitrite: Negative   Leuk Esterase: Negative / RBC: x / WBC x   Sq Epi: x / Non Sq Epi: x / Bacteria: x      RADIOLOGY & ADDITIONAL TESTS:      Care Discussed with Consultants/Other Providers [ x] YES  [ ] NO           Patient is a 56y old  Male who presents with a chief complaint of STEMI (2022 06:40)    HPI:  56 yo M with a hx of alcohol use, HTN, uncontrolled DM, hospitalization for alcohol withdrawal; TIA on aspirin presented w. acute onset of exertional pressure-like 10/10 chest pain w. radiating and associated with sweats and shortness of breath that started yesterday and persisted constantly throughout the night. Patient says he tried Advil and rest with mild alleviation. 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 Yesterday, he also experienced subjective fever and dry coughing. However, patient reports absence of headache, sudden weakness, dizziness, lightheadedness, abdominal pain, urinary symptoms, GI symptoms, or LE edema. His last EtOH drink was ~2 weeks ago.    CCU Course: Patient was febrile to 101.6F with shivers but otherwise asymptomatic; UA negative; Blood cultures collected. Hemodynamically stable and A&Ox4 and saturating within normal range on room air.     (2022 13:25)       INTERVAL HPI/OVERNIGHT EVENTS:   No fever overnight, howver, diaphoretic s/p 1mg PO Ativan. On CIWA protocol  Afebrile, hemodynamically stable     Subjective:  Pt feeling weak but better compared to yesterday. Also, endorsing mild sob. Denies f/c, cough, cp, palpitations, n/v, abdominal pain.       ICU Vital Signs Last 24 Hrs  T(C): 37.7 (2022 08:00), Max: 39.2 (2022 16:42)  T(F): 99.9 (2022 08:00), Max: 102.5 (2022 16:42)  HR: 90 (2022 08:00) (74 - 95)  BP: --  BP(mean): --  ABP: --  ABP(mean): --  RR: 27 (2022 08:00) (18 - 29)  SpO2: 95% (2022 08:00) (90% - 99%)    I&O's Summary    2022 07:01  -  2022 07:00  --------------------------------------------------------  IN: 1777.5 mL / OUT: 1950 mL / NET: -172.5 mL    2022 07:01  -  2022 08:13  --------------------------------------------------------  IN: 12.5 mL / OUT: 0 mL / NET: 12.5 mL    Daily     Daily Weight in k.7 (2022 00:00)    Adult Advanced Hemodynamics Last 24 Hrs  CVP(mm Hg): --  CVP(cm H2O): --  CO: --  CI: --  PA: --  PA(mean): --  PCWP: --  SVR: --  SVRI: --  PVR: --  PVRI: --    EKG/Telemetry Events: sinus rhythm    MEDICATIONS  (STANDING):  aspirin enteric coated 81 milliGRAM(s) Oral daily  atorvastatin 80 milliGRAM(s) Oral at bedtime  azithromycin  IVPB 500 milliGRAM(s) IV Intermittent daily  cefepime   IVPB 1000 milliGRAM(s) IV Intermittent every 12 hours  chlorhexidine 4% Liquid 1 Application(s) Topical <User Schedule>  dextrose 40% Gel 15 Gram(s) Oral once  dextrose 5%. 1000 milliLiter(s) (50 mL/Hr) IV Continuous <Continuous>  dextrose 5%. 1000 milliLiter(s) (100 mL/Hr) IV Continuous <Continuous>  dextrose 50% Injectable 25 Gram(s) IV Push once  dextrose 50% Injectable 12.5 Gram(s) IV Push once  dextrose 50% Injectable 25 Gram(s) IV Push once  folic acid 1 milliGRAM(s) Oral daily  glucagon  Injectable 1 milliGRAM(s) IntraMuscular once  heparin  Infusion 1000 Unit(s)/Hr (12.5 mL/Hr) IV Continuous <Continuous>  insulin glargine Injectable (LANTUS) 10 Unit(s) SubCutaneous at bedtime  insulin lispro (ADMELOG) corrective regimen sliding scale   SubCutaneous three times a day before meals  insulin lispro (ADMELOG) corrective regimen sliding scale   SubCutaneous at bedtime  multivitamin 1 Tablet(s) Oral daily  thiamine 100 milliGRAM(s) Oral daily  ticagrelor 90 milliGRAM(s) Oral every 12 hours  vancomycin  IVPB 1000 milliGRAM(s) IV Intermittent every 12 hours    MEDICATIONS  (PRN):  acetaminophen     Tablet .. 650 milliGRAM(s) Oral every 6 hours PRN Temp greater or equal to 38C (100.4F), Moderate Pain (4 - 6)    PHYSICAL EXAM:  GENERAL: toxic-appearing male in mild distress  HEAD: Atraumatic, Normocephalic  EYES: conjunctiva and sclera clear  ENT: Moist mucous membranes  NECK: Supple, No JVD; no palpable pre-auricular, post-auricular, occipital, mandibular, submental, supra-clavicular, or infra-clavicular lymph nodes   CHEST/LUNG: CTAB; No rales, rhonchi, wheezing, or rubs. Unlabored respirations  HEART: RRR; nl S1 and S2; currently w/ IABP  ABDOMEN: Soft, Nontender to light and deep palpation, Nondistended but obese abdomen  EXTREMITIES: 2+ Peripheral radial pulses, brisk capillary refill. No clubbing, cyanosis, or bilateral LE edema; R groin without signs of infection or hematoma   NERVOUS SYSTEM: Alert & Oriented x3, speech clear  SKIN: No rashes or lesions    LABS:                        11.1   9.28  )-----------( 156      ( 2022 04:07 )             33.8         131<L>  |  97<L>  |  22  ----------------------------<  242<H>  4.0   |  24  |  1.24    Ca    8.4      2022 04:07  Phos  2.8       Mg     2.30         TPro  6.1  /  Alb  3.2<L>  /  TBili  0.6  /  DBili  x   /  AST  66<H>  /  ALT  40  /  AlkPhos  50      LIVER FUNCTIONS - ( 2022 04:07 )  Alb: 3.2 g/dL / Pro: 6.1 g/dL / ALK PHOS: 50 U/L / ALT: 40 U/L / AST: 66 U/L / GGT: x           PT/INR - ( 2022 10:40 )   PT: 11.9 sec;   INR: 1.03 ratio      PTT - ( 2022 04:07 )  PTT:53.3 sec    CAPILLARY BLOOD GLUCOSE  POCT Blood Glucose.: 264 mg/dL (2022 07:30)  POCT Blood Glucose.: 316 mg/dL (2022 21:15)  POCT Blood Glucose.: 233 mg/dL (2022 16:48)  POCT Blood Glucose.: 301 mg/dL (2022 11:27)      CKMB Units: 11.3 ng/mL ( @ 04:07)  Creatine Kinase, Serum: 698 U/L ( @ 04:07)    CARDIAC MARKERS ( 2022 04:07 )  x     / x     / 698 U/L / x     / 11.3 ng/mL  CARDIAC MARKERS ( 2022 02:20 )  x     / x     / 1706 U/L / x     / 43.3 ng/mL  CARDIAC MARKERS ( 2022 15:05 )  x     / x     / 3078 U/L / x     / 149.2 ng/mL  CARDIAC MARKERS ( 2022 10:40 )  x     / x     / x     / x     / 160.3 ng/mL      Urinalysis Basic - ( 2022 13:23 )    Color: Colorless / Appearance: Clear / S.019 / pH: x  Gluc: x / Ketone: Negative  / Bili: Negative / Urobili: <2 mg/dL   Blood: x / Protein: Negative / Nitrite: Negative   Leuk Esterase: Negative / RBC: x / WBC x   Sq Epi: x / Non Sq Epi: x / Bacteria: x      RADIOLOGY & ADDITIONAL TESTS:  < from: Xray Chest 1 View- PORTABLE-Routine (Xray Chest 1 View- PORTABLE-Routine in AM.) (22 @ 09:43) >  FINDINGS:  Left-sided loop recorder in place.  IABP marker is 1.9cm below top of aortic knob.  Heart/Vascular: Stable slight cardiomegaly.  Pulmonary: There are bilateral, patchy lung opacities/infiltrates  which   have improved on the left and progressed on the right. No pleural   effusion. No pneumothorax  Bones: Degenerative changes of the thoracic spine.    Impression:  IABP marker is stable in position.      < end of copied text >      Care Discussed with Consultants/Other Providers [ x] YES  [ ] NO

## 2022-02-27 NOTE — PROGRESS NOTE ADULT - ASSESSMENT
55M PMHx of alcohol use disorder w/ prior hospitalization for alcohol w/d, TIA (on aspirin), HTN, and uncontrolled IDDM presented with acute onset of typical angina and found to have an inferolateral STEMI w/ 100% occlusion both proximal LCxs and distal OM s/p DESx1. Admitted to CCU for further management.     NEURO:  -Currently A&Ox3  -No active issues    CV:  #Typical angina w. inferolateral wall STEMI (+trops, +EKG changes)  - S/p DESx1 stent to proximal LCx  - TTE indicates severe anterolateral hypokinesis, severe apical hypokinesis, and diaphragmatic akinesis. EF 35%  - Continue heparin gtt for intra-aortic balloon pump; daily CXR to assess IABP placement     - DAPT: ASA 81 & Ticagrelor 90mg BID for at least 1 year  - Hold off B-blockers  - Hold off ACE-I but will introduce as tolerated once patient is on BB's and renal function with appropriate function  - Continue atorvastatin 80mg QD  - troponins peaked to >10,000 but downtrending to ~5500    PULM:  - WBC elevated initially; endorsed dry cough prior to admission and subjective fevers as well; shivering upon CCU admission  - CXR appears unremarkable for pneumonia    RENAL:  #HOA likely prerenal i.s.o recent MI vs infection vs medications  - SCr 1.52 from 1.18 with unknown baseline; now downtrended to 1.24  - unremarkable electrolyte derangements  - Trend SCr; avoid nephrotoxic meds and renally dose accordingly if need be  - euvolemic on exam; strict I&O's; monitor urine output     GI:  #Transaminitis: likely in setting of recent inferolateral MI and EtOH use history  - Trend LFT's  - DASH/TLC carbs consistent diet     ENDO:  #DM2: HbA1c   - Low Insulin Sliding Scale    HEMATOLOGIC:  #mild normochromic normocytic anemia in setting of recent inferolateral STEMI Hgb 12.9  -monitor for now    #DVT prophylaxis  -on heparin gtt and DAPT    ID:  #SIRS+ w. leukocytosis (~15) and fever 101.6F i.s.o inferolateral STEMI  - downtrending WBC 9.28 from 11.5 likely MI and/or possible infection  - CXR appears unremarkable  - UA unremarkable; BCx pending; ordered urine legionella ag   - Cont w. vancomycin (2/25- ); dc-ed Zosyn (2/25-2/26); added cefepime and azithromycin as broader empiric coverage    - f.u vanc trough     SKIN:  #Lines/tubes: IV peripheral access and intraballoon aortic pump    Dispo:  -pending clinical improvement 55M PMHx of alcohol use disorder w/ prior hospitalization for alcohol w/d, TIA (on aspirin), HTN, and uncontrolled IDDM presented with acute onset of typical angina and found to have an inferolateral STEMI w/ 100% occlusion both proximal LCxs and distal OM s/p DESx1. Admitted to CCU for further management.     NEURO:  -Currently A&Ox3  -No active issues    CV:  #Typical angina w. inferolateral wall STEMI (+trops, +EKG changes)  - S/p DESx1 stent to proximal LCx  - TTE indicates severe anterolateral hypokinesis, severe apical hypokinesis, and diaphragmatic akinesis. EF 35%  - Continue heparin gtt for intra-aortic balloon pump; daily CXR to assess IABP placement     - DAPT: ASA 81 & Ticagrelor 90mg BID for at least 1 year  - Hold off B-blockers  - Hold off ACE-I but will introduce as tolerated once patient is on BB's and renal function with appropriate function  - Continue atorvastatin 80mg QD  - troponins peaked to >10,000 but downtrending to ~5500    PULM:  - WBC elevated initially; endorsed dry cough prior to admission and subjective fevers as well; shivering upon CCU admission  - CXR appears unremarkable for pneumonia    RENAL:  #HOA likely prerenal i.s.o recent MI vs infection vs medications  - SCr 1.52 from 1.18 with unknown baseline during admission; now downtrended to 1.24  - unremarkable electrolyte derangements  - Trend SCr; avoid nephrotoxic meds and renally dose accordingly if need be  - euvolemic on exam; strict I&O's; monitor urine output     GI:  #Transaminitis: likely in setting of recent inferolateral MI and EtOH use history  - Trend LFT's  - DASH/TLC carbs consistent diet     ENDO:  #DM2: HbA1c   - Low Insulin Sliding Scale    HEMATOLOGIC:  #mild normochromic normocytic anemia in setting of recent inferolateral STEMI Hgb 12.9  -monitor for now    #DVT prophylaxis  -on heparin gtt and DAPT    ID:  #SIRS+ w. leukocytosis (~15) and fever 101.6F i.s.o inferolateral STEMI  - downtrending WBC 9.28 from 11.5 likely MI and/or possible infection  - CXR appears unremarkable  - UA unremarkable; BCx pending; ordered urine legionella ag   - Cont w. vancomycin (2/25- ); dc-ed Zosyn (2/25-2/26)  - Continue cefepime and azithromycin (2/26- ) as broader empiric coverage    - f.u vanc trough     SKIN:  #Lines/tubes: IV peripheral access and IABP    Dispo:  -pending clinical improvement 55M PMHx of alcohol use disorder w/ prior hospitalization for alcohol w/d, TIA (on aspirin), HTN, and uncontrolled IDDM presented with acute onset of typical angina and found to have an inferolateral STEMI w/ 100% occlusion both proximal LCxs and distal OM s/p DESx1. Admitted to CCU for further management.     NEURO:  -Currently A&Ox3  -No active issues    CV:  #Typical angina w. inferolateral wall STEMI (+trops, +EKG changes)  - S/p DESx1 stent to proximal LCx  - TTE indicates severe anterolateral hypokinesis, severe apical hypokinesis, and diaphragmatic akinesis. EF 35%  - Continue heparin gtt for intra-aortic balloon pump; daily CXR to assess IABP placement     - DAPT: ASA 81 & Ticagrelor 90mg BID for at least 1 year  - Hold off B-blockers  - Hold off ACE-I but will introduce as tolerated once patient is on BB's and renal function with appropriate function  - Continue atorvastatin 80mg QD  - troponins peaked to >10,000 but downtrending to ~5500    PULM:  - WBC elevated initially; endorsed dry cough prior to admission and subjective fevers as well; shivering upon CCU admission  - CXR appears unremarkable for pneumonia    RENAL:  #HOA likely prerenal i.s.o recent MI vs infection vs medications  - SCr 1.52 from 1.18 with unknown baseline during admission; now downtrended to 1.24  - unremarkable electrolyte derangements  - Trend SCr; avoid nephrotoxic meds and renally dose accordingly if need be  - euvolemic on exam; strict I&O's; monitor urine output     GI:  #Transaminitis: likely in setting of recent inferolateral MI and EtOH use history  - Trend LFT's  - DASH/TLC carbs consistent diet     ENDO:  #DM2: HbA1c   - Increase Lantus from 10 units to 12 units  - Low Insulin Sliding Scale    HEMATOLOGIC:  #mild normochromic normocytic anemia in setting of recent inferolateral STEMI Hgb 12.9  -monitor for now    #DVT prophylaxis  -on heparin gtt and DAPT    ID:  #SIRS+ w. leukocytosis (~15) and fever 101.6F i.s.o inferolateral STEMI  - downtrending WBC 9.28 from 11.5 likely MI and/or possible infection  - CXR appears unremarkable  - UA unremarkable; BCx pending; ordered urine legionella ag   - Cont w. vancomycin (2/25- ); dc-ed Zosyn (2/25-2/26)  - Continue cefepime and azithromycin (2/26- ) as broader empiric coverage    - f.u vanc trough     SKIN:  #Lines/tubes: IV peripheral access and IABP    Dispo:  -pending clinical improvement 55M PMHx of alcohol use disorder w/ prior hospitalization for alcohol w/d, TIA (on aspirin), HTN, and uncontrolled IDDM presented with acute onset of typical angina and found to have an inferolateral STEMI w/ 100% occlusion both proximal LCxs and distal OM s/p DESx1. Admitted to CCU for further management.     NEURO:  -Currently A&Ox3  -No active issues    CV:  #Typical angina w. inferolateral wall STEMI (+trops, +EKG changes); s/p IABP  - S/p DESx1 stent to proximal LCx  - TTE indicates severe anterolateral hypokinesis, severe apical hypokinesis, and diaphragmatic akinesis. EF 35%  - Continue heparin gtt for intra-aortic balloon pump; DC heparin 2/28/22 to remove IABP  - Daily CXR to assess IABP placement     - DAPT: ASA 81 & Ticagrelor 90mg BID for at least 1 year  - start Losartan 12.5mg QD  - Continue atorvastatin 80mg QD  - Hold off B-blockers  - troponins peaked to >10,000 but downtrending to ~5500    PULM:  - WBC elevated initially; endorsed dry cough prior to admission and subjective fevers as well; shivering upon CCU admission  - CXR appears unremarkable for pneumonia  - WBC continues to downtrend; no fever reported overnight    RENAL:  #HOA likely prerenal i.s.o recent MI vs infection vs medications  - SCr 1.52 with unknown baseline during admission; now downtrended to 1.24  - Trend SCr; avoid nephrotoxic meds and renally dose accordingly if need be  - euvolemic on exam; strict I&O's; monitor urine output     GI:  #Transaminitis: likely in setting of recent inferolateral MI and EtOH use history  - Trend LFT's  - DASH/TLC carbs consistent diet     ENDO:  #DM2: HbA1c   - Increase Lantus from 10 units to 12 units  - Low Insulin Sliding Scale    HEMATOLOGIC:  #mild normochromic normocytic anemia in setting of recent inferolateral STEMI Hgb 12.9  -monitor for now    #DVT prophylaxis  -on heparin gtt and DAPT    ID:  #SIRS+ w. leukocytosis (~15) and fever 101.6F i.s.o inferolateral STEMI  - downtrending WBC 9.28 from 11.5 likely MI and/or possible infection  - CXR appears unremarkable  - UA unremarkable; f/u Bcx  - Cont w. vancomycin (2/25- ); dc-ed Zosyn (2/25-2/26); consider DC vanc once IABP removed  - Continue cefepime (2/26-3/2); dc-ed azithromycin (2/26-2/27)    - f.u vanc trough     SKIN:  #Lines/tubes: IV peripheral access and IABP    Dispo:  -pending clinical improvement

## 2022-02-28 LAB
ALBUMIN SERPL ELPH-MCNC: 3.1 G/DL — LOW (ref 3.3–5)
ALP SERPL-CCNC: 50 U/L — SIGNIFICANT CHANGE UP (ref 40–120)
ALT FLD-CCNC: 28 U/L — SIGNIFICANT CHANGE UP (ref 4–41)
ANION GAP SERPL CALC-SCNC: 12 MMOL/L — SIGNIFICANT CHANGE UP (ref 7–14)
APTT BLD: 64.7 SEC — HIGH (ref 27–36.3)
AST SERPL-CCNC: 32 U/L — SIGNIFICANT CHANGE UP (ref 4–40)
BILIRUB SERPL-MCNC: 0.6 MG/DL — SIGNIFICANT CHANGE UP (ref 0.2–1.2)
BUN SERPL-MCNC: 19 MG/DL — SIGNIFICANT CHANGE UP (ref 7–23)
CALCIUM SERPL-MCNC: 8.7 MG/DL — SIGNIFICANT CHANGE UP (ref 8.4–10.5)
CHLORIDE SERPL-SCNC: 98 MMOL/L — SIGNIFICANT CHANGE UP (ref 98–107)
CK MB BLD-MCNC: 1.4 % — SIGNIFICANT CHANGE UP (ref 0–2.5)
CK MB CFR SERPL CALC: 4.1 NG/ML — SIGNIFICANT CHANGE UP
CK SERPL-CCNC: 299 U/L — HIGH (ref 30–200)
CO2 SERPL-SCNC: 22 MMOL/L — SIGNIFICANT CHANGE UP (ref 22–31)
CREAT SERPL-MCNC: 1.16 MG/DL — SIGNIFICANT CHANGE UP (ref 0.5–1.3)
GLUCOSE BLDC GLUCOMTR-MCNC: 217 MG/DL — HIGH (ref 70–99)
GLUCOSE BLDC GLUCOMTR-MCNC: 251 MG/DL — HIGH (ref 70–99)
GLUCOSE BLDC GLUCOMTR-MCNC: 253 MG/DL — HIGH (ref 70–99)
GLUCOSE BLDC GLUCOMTR-MCNC: 320 MG/DL — HIGH (ref 70–99)
GLUCOSE SERPL-MCNC: 237 MG/DL — HIGH (ref 70–99)
HCT VFR BLD CALC: 32.9 % — LOW (ref 39–50)
HGB BLD-MCNC: 11 G/DL — LOW (ref 13–17)
MAGNESIUM SERPL-MCNC: 2 MG/DL — SIGNIFICANT CHANGE UP (ref 1.6–2.6)
MCHC RBC-ENTMCNC: 29.6 PG — SIGNIFICANT CHANGE UP (ref 27–34)
MCHC RBC-ENTMCNC: 33.4 GM/DL — SIGNIFICANT CHANGE UP (ref 32–36)
MCV RBC AUTO: 88.4 FL — SIGNIFICANT CHANGE UP (ref 80–100)
NRBC # BLD: 0 /100 WBCS — SIGNIFICANT CHANGE UP
NRBC # FLD: 0 K/UL — SIGNIFICANT CHANGE UP
PHOSPHATE SERPL-MCNC: 2.9 MG/DL — SIGNIFICANT CHANGE UP (ref 2.5–4.5)
PLATELET # BLD AUTO: 167 K/UL — SIGNIFICANT CHANGE UP (ref 150–400)
POTASSIUM SERPL-MCNC: 4 MMOL/L — SIGNIFICANT CHANGE UP (ref 3.5–5.3)
POTASSIUM SERPL-SCNC: 4 MMOL/L — SIGNIFICANT CHANGE UP (ref 3.5–5.3)
PROT SERPL-MCNC: 6 G/DL — SIGNIFICANT CHANGE UP (ref 6–8.3)
RBC # BLD: 3.72 M/UL — LOW (ref 4.2–5.8)
RBC # FLD: 12.7 % — SIGNIFICANT CHANGE UP (ref 10.3–14.5)
SODIUM SERPL-SCNC: 132 MMOL/L — LOW (ref 135–145)
TROPONIN T, HIGH SENSITIVITY RESULT: 4045 NG/L — CRITICAL HIGH
VANCOMYCIN TROUGH SERPL-MCNC: 12.8 UG/ML — SIGNIFICANT CHANGE UP (ref 10–20)
WBC # BLD: 7.58 K/UL — SIGNIFICANT CHANGE UP (ref 3.8–10.5)
WBC # FLD AUTO: 7.58 K/UL — SIGNIFICANT CHANGE UP (ref 3.8–10.5)

## 2022-02-28 PROCEDURE — 99233 SBSQ HOSP IP/OBS HIGH 50: CPT

## 2022-02-28 PROCEDURE — 71045 X-RAY EXAM CHEST 1 VIEW: CPT | Mod: 26

## 2022-02-28 RX ORDER — HEPARIN SODIUM 5000 [USP'U]/ML
5000 INJECTION INTRAVENOUS; SUBCUTANEOUS EVERY 8 HOURS
Refills: 0 | Status: DISCONTINUED | OUTPATIENT
Start: 2022-02-28 | End: 2022-03-03

## 2022-02-28 RX ORDER — LOSARTAN POTASSIUM 100 MG/1
25 TABLET, FILM COATED ORAL DAILY
Refills: 0 | Status: DISCONTINUED | OUTPATIENT
Start: 2022-03-01 | End: 2022-03-02

## 2022-02-28 RX ORDER — INSULIN GLARGINE 100 [IU]/ML
15 INJECTION, SOLUTION SUBCUTANEOUS AT BEDTIME
Refills: 0 | Status: DISCONTINUED | OUTPATIENT
Start: 2022-02-28 | End: 2022-03-02

## 2022-02-28 RX ORDER — LOSARTAN POTASSIUM 100 MG/1
12.5 TABLET, FILM COATED ORAL ONCE
Refills: 0 | Status: COMPLETED | OUTPATIENT
Start: 2022-02-28 | End: 2022-02-28

## 2022-02-28 RX ORDER — METOPROLOL TARTRATE 50 MG
25 TABLET ORAL
Refills: 0 | Status: DISCONTINUED | OUTPATIENT
Start: 2022-02-28 | End: 2022-03-02

## 2022-02-28 RX ADMIN — Medication 6: at 07:46

## 2022-02-28 RX ADMIN — LOSARTAN POTASSIUM 12.5 MILLIGRAM(S): 100 TABLET, FILM COATED ORAL at 15:00

## 2022-02-28 RX ADMIN — TICAGRELOR 90 MILLIGRAM(S): 90 TABLET ORAL at 05:48

## 2022-02-28 RX ADMIN — CEFEPIME 100 MILLIGRAM(S): 1 INJECTION, POWDER, FOR SOLUTION INTRAMUSCULAR; INTRAVENOUS at 17:08

## 2022-02-28 RX ADMIN — ATORVASTATIN CALCIUM 80 MILLIGRAM(S): 80 TABLET, FILM COATED ORAL at 21:33

## 2022-02-28 RX ADMIN — LOSARTAN POTASSIUM 12.5 MILLIGRAM(S): 100 TABLET, FILM COATED ORAL at 05:48

## 2022-02-28 RX ADMIN — CHLORHEXIDINE GLUCONATE 1 APPLICATION(S): 213 SOLUTION TOPICAL at 06:00

## 2022-02-28 RX ADMIN — Medication 81 MILLIGRAM(S): at 11:50

## 2022-02-28 RX ADMIN — HEPARIN SODIUM 5000 UNIT(S): 5000 INJECTION INTRAVENOUS; SUBCUTANEOUS at 21:33

## 2022-02-28 RX ADMIN — Medication 650 MILLIGRAM(S): at 21:21

## 2022-02-28 RX ADMIN — Medication 250 MILLIGRAM(S): at 17:08

## 2022-02-28 RX ADMIN — TICAGRELOR 90 MILLIGRAM(S): 90 TABLET ORAL at 17:07

## 2022-02-28 RX ADMIN — Medication 250 MILLIGRAM(S): at 05:48

## 2022-02-28 RX ADMIN — Medication 650 MILLIGRAM(S): at 02:05

## 2022-02-28 RX ADMIN — Medication 25 MILLIGRAM(S): at 17:07

## 2022-02-28 RX ADMIN — Medication 100 MILLIGRAM(S): at 11:50

## 2022-02-28 RX ADMIN — CEFEPIME 100 MILLIGRAM(S): 1 INJECTION, POWDER, FOR SOLUTION INTRAMUSCULAR; INTRAVENOUS at 05:12

## 2022-02-28 RX ADMIN — Medication 1 MILLIGRAM(S): at 21:54

## 2022-02-28 RX ADMIN — Medication 4: at 17:07

## 2022-02-28 RX ADMIN — Medication 650 MILLIGRAM(S): at 20:51

## 2022-02-28 RX ADMIN — Medication 1 MILLIGRAM(S): at 11:50

## 2022-02-28 RX ADMIN — Medication 4: at 21:32

## 2022-02-28 RX ADMIN — Medication 650 MILLIGRAM(S): at 03:00

## 2022-02-28 RX ADMIN — INSULIN GLARGINE 15 UNIT(S): 100 INJECTION, SOLUTION SUBCUTANEOUS at 21:33

## 2022-02-28 RX ADMIN — Medication 1 TABLET(S): at 11:50

## 2022-02-28 RX ADMIN — Medication 6: at 11:50

## 2022-02-28 NOTE — PROGRESS NOTE ADULT - SUBJECTIVE AND OBJECTIVE BOX
Patient is a 56y old  Male who presents with a chief complaint of STEMI (2022 08:11)    HPI:  56 yo M with a hx of alcohol use, HTN, uncontrolled DM, hospitalization for alcohol withdrawal; TIA on aspirin presented w. acute onset of exertional pressure-like 10/10 chest pain w. radiating and associated with sweats and shortness of breath that started yesterday and persisted constantly throughout the night. Patient says he tried Advil and rest with mild alleviation. 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 Yesterday, he also experienced subjective fever and dry coughing. However, patient reports absence of headache, sudden weakness, dizziness, lightheadedness, abdominal pain, urinary symptoms, GI symptoms, or LE edema. His last EtOH drink was ~2 weeks ago.    CCU Course: Patient was febrile to 101.6F with shivers but otherwise asymptomatic; UA negative; Blood cultures collected. Hemodynamically stable and A&Ox4 and saturating within normal range on room air.     (2022 13:25)       INTERVAL HPI/OVERNIGHT EVENTS:   Febrile to 101.8 overnight. Continued on abx.     Subjective:      ICU Vital Signs Last 24 Hrs  T(C): 37.2 (2022 08:00), Max: 38.8 (2022 19:44)  T(F): 99 (2022 08:00), Max: 101.8 (2022 19:44)  HR: 95 (2022 08:00) (73 - 105)  BP: --  BP(mean): --  ABP: --  ABP(mean): --  RR: 21 (2022 08:00) (17 - 35)  SpO2: 95% (2022 08:00) (88% - 99%)    I&O's Summary    2022 07:01  -  2022 07:00  --------------------------------------------------------  IN: 1891 mL / OUT: 1860 mL / NET: 31 mL    Daily     Daily Weight in k.1 (2022 02:00)    Adult Advanced Hemodynamics Last 24 Hrs  CVP(mm Hg): --  CVP(cm H2O): --  CO: --  CI: --  PA: --  PA(mean): --  PCWP: --  SVR: --  SVRI: --  PVR: --  PVRI: --    EKG/Telemetry Events: sinus rhythm    MEDICATIONS  (STANDING):  aspirin enteric coated 81 milliGRAM(s) Oral daily  atorvastatin 80 milliGRAM(s) Oral at bedtime  cefepime   IVPB 1000 milliGRAM(s) IV Intermittent every 12 hours  chlorhexidine 4% Liquid 1 Application(s) Topical <User Schedule>  dextrose 40% Gel 15 Gram(s) Oral once  dextrose 5%. 1000 milliLiter(s) (50 mL/Hr) IV Continuous <Continuous>  dextrose 5%. 1000 milliLiter(s) (100 mL/Hr) IV Continuous <Continuous>  dextrose 50% Injectable 25 Gram(s) IV Push once  dextrose 50% Injectable 12.5 Gram(s) IV Push once  dextrose 50% Injectable 25 Gram(s) IV Push once  folic acid 1 milliGRAM(s) Oral daily  glucagon  Injectable 1 milliGRAM(s) IntraMuscular once  insulin glargine Injectable (LANTUS) 12 Unit(s) SubCutaneous at bedtime  insulin lispro (ADMELOG) corrective regimen sliding scale   SubCutaneous three times a day before meals  insulin lispro (ADMELOG) corrective regimen sliding scale   SubCutaneous at bedtime  losartan 12.5 milliGRAM(s) Oral daily  multivitamin 1 Tablet(s) Oral daily  thiamine 100 milliGRAM(s) Oral daily  ticagrelor 90 milliGRAM(s) Oral every 12 hours  vancomycin  IVPB 1000 milliGRAM(s) IV Intermittent every 12 hours    MEDICATIONS  (PRN):  acetaminophen     Tablet .. 650 milliGRAM(s) Oral every 6 hours PRN Temp greater or equal to 38C (100.4F), Moderate Pain (4 - 6)    PHYSICAL EXAM:  GENERAL: toxic-appearing male in mild distress  HEAD: Atraumatic, Normocephalic  EYES: conjunctiva and sclera clear  ENT: Moist mucous membranes  NECK: Supple, No JVD; no palpable pre-auricular, post-auricular, occipital, mandibular, submental, supra-clavicular, or infra-clavicular lymph nodes   CHEST/LUNG: CTAB; No rales, rhonchi, wheezing, or rubs. Unlabored respirations  HEART: RRR; nl S1 and S2; currently w/ IABP  ABDOMEN: Soft, Nontender to light and deep palpation, Nondistended but obese abdomen  EXTREMITIES: 2+ Peripheral radial pulses, brisk capillary refill. No clubbing, cyanosis, or bilateral LE edema; R groin without signs of infection or hematoma   NERVOUS SYSTEM: Alert & Oriented x3, speech clear  SKIN: No rashes or lesions    LABS:                        11.0   7.58  )-----------( 167      ( 2022 01:53 )             32.9         132<L>  |  98  |  19  ----------------------------<  237<H>  4.0   |  22  |  1.16    Ca    8.7      2022 01:53  Phos  2.9       Mg     2.00         TPro  6.0  /  Alb  3.1<L>  /  TBili  0.6  /  DBili  x   /  AST  32  /  ALT  28  /  AlkPhos  50      LIVER FUNCTIONS - ( 2022 01:53 )  Alb: 3.1 g/dL / Pro: 6.0 g/dL / ALK PHOS: 50 U/L / ALT: 28 U/L / AST: 32 U/L / GGT: x           PTT - ( 2022 01:53 )  PTT:64.7 sec    CAPILLARY BLOOD GLUCOSE  POCT Blood Glucose.: 251 mg/dL (2022 07:37)  POCT Blood Glucose.: 263 mg/dL (2022 21:24)  POCT Blood Glucose.: 236 mg/dL (2022 17:01)  POCT Blood Glucose.: 264 mg/dL (2022 11:14)      CKMB Units: 4.1 ng/mL ( @ 01:53)  Creatine Kinase, Serum: 299 U/L ( @ 01:53)    CARDIAC MARKERS ( 2022 01:53 )  x     / x     / 299 U/L / x     / 4.1 ng/mL  CARDIAC MARKERS ( 2022 04:07 )  x     / x     / 698 U/L / x     / 11.3 ng/mL    RADIOLOGY & ADDITIONAL TESTS:  CXR:        Care Discussed with Consultants/Other Providers [ x] YES  [ ] NO         Patient is a 56y old  Male who presents with a chief complaint of STEMI (2022 08:11)    INTERVAL HPI/OVERNIGHT EVENTS:   Febrile to 101.8 overnight. Continued on abx. Pt asymptomatic.  DC'ed heparin at 5am    Subjective:  Pt doing well this AM w/o any issues. Denies f/c, sob, cp, palpitations.     ICU Vital Signs Last 24 Hrs  T(C): 37.2 (2022 08:00), Max: 38.8 (2022 19:44)  T(F): 99 (2022 08:00), Max: 101.8 (2022 19:44)  HR: 95 (2022 08:00) (73 - 105)  BP: --  BP(mean): --  ABP: --  ABP(mean): --  RR: 21 (2022 08:00) (17 - 35)  SpO2: 95% (2022 08:00) (88% - 99%)    I&O's Summary    2022 07:01  -  2022 07:00  --------------------------------------------------------  IN: 1891 mL / OUT: 1860 mL / NET: 31 mL    Daily     Daily Weight in k.1 (2022 02:00)    Adult Advanced Hemodynamics Last 24 Hrs  CVP(mm Hg): --  CVP(cm H2O): --  CO: --  CI: --  PA: --  PA(mean): --  PCWP: --  SVR: --  SVRI: --  PVR: --  PVRI: --    EKG/Telemetry Events: sinus rhythm    MEDICATIONS  (STANDING):  aspirin enteric coated 81 milliGRAM(s) Oral daily  atorvastatin 80 milliGRAM(s) Oral at bedtime  cefepime   IVPB 1000 milliGRAM(s) IV Intermittent every 12 hours  chlorhexidine 4% Liquid 1 Application(s) Topical <User Schedule>  dextrose 40% Gel 15 Gram(s) Oral once  dextrose 5%. 1000 milliLiter(s) (50 mL/Hr) IV Continuous <Continuous>  dextrose 5%. 1000 milliLiter(s) (100 mL/Hr) IV Continuous <Continuous>  dextrose 50% Injectable 25 Gram(s) IV Push once  dextrose 50% Injectable 12.5 Gram(s) IV Push once  dextrose 50% Injectable 25 Gram(s) IV Push once  folic acid 1 milliGRAM(s) Oral daily  glucagon  Injectable 1 milliGRAM(s) IntraMuscular once  insulin glargine Injectable (LANTUS) 12 Unit(s) SubCutaneous at bedtime  insulin lispro (ADMELOG) corrective regimen sliding scale   SubCutaneous three times a day before meals  insulin lispro (ADMELOG) corrective regimen sliding scale   SubCutaneous at bedtime  losartan 12.5 milliGRAM(s) Oral daily  multivitamin 1 Tablet(s) Oral daily  thiamine 100 milliGRAM(s) Oral daily  ticagrelor 90 milliGRAM(s) Oral every 12 hours  vancomycin  IVPB 1000 milliGRAM(s) IV Intermittent every 12 hours    MEDICATIONS  (PRN):  acetaminophen     Tablet .. 650 milliGRAM(s) Oral every 6 hours PRN Temp greater or equal to 38C (100.4F), Moderate Pain (4 - 6)    PHYSICAL EXAM:  GENERAL: nontoxic-appearing male in no acute distress  HEAD: Atraumatic, Normocephalic  EYES: conjunctiva and sclera clear  ENT: Moist mucous membranes  NECK: Supple, No JVD; no palpable pre-auricular, post-auricular, occipital, mandibular, submental, supra-clavicular, or infra-clavicular lymph nodes   CHEST/LUNG: CTAB; No rales, rhonchi, wheezing, or rubs. Unlabored respirations  HEART: RRR; nl S1 and S2; currently w/ IABP  ABDOMEN: Soft, Nontender to light and deep palpation, Nondistended but obese abdomen  EXTREMITIES: 2+ Peripheral radial pulses, brisk capillary refill. No clubbing, cyanosis, or bilateral LE edema; R groin without signs of infection or hematoma   NERVOUS SYSTEM: Alert & Oriented x3, speech clear  SKIN: No rashes or lesions    LABS:                        11.0   7.58  )-----------( 167      ( 2022 01:53 )             32.9     02-28    132<L>  |  98  |  19  ----------------------------<  237<H>  4.0   |  22  |  1.16    Ca    8.7      2022 01:53  Phos  2.9       Mg     2.00         TPro  6.0  /  Alb  3.1<L>  /  TBili  0.6  /  DBili  x   /  AST  32  /  ALT  28  /  AlkPhos  50      LIVER FUNCTIONS - ( 2022 01:53 )  Alb: 3.1 g/dL / Pro: 6.0 g/dL / ALK PHOS: 50 U/L / ALT: 28 U/L / AST: 32 U/L / GGT: x           PTT - ( 2022 01:53 )  PTT:64.7 sec    CAPILLARY BLOOD GLUCOSE  POCT Blood Glucose.: 251 mg/dL (2022 07:37)  POCT Blood Glucose.: 263 mg/dL (2022 21:24)  POCT Blood Glucose.: 236 mg/dL (2022 17:01)  POCT Blood Glucose.: 264 mg/dL (2022 11:14)      CKMB Units: 4.1 ng/mL ( @ 01:53)  Creatine Kinase, Serum: 299 U/L ( @ 01:53)    CARDIAC MARKERS ( 2022 01:53 )  x     / x     / 299 U/L / x     / 4.1 ng/mL  CARDIAC MARKERS ( 2022 04:07 )  x     / x     / 698 U/L / x     / 11.3 ng/mL    RADIOLOGY & ADDITIONAL TESTS:      Care Discussed with Consultants/Other Providers [ x] YES  [ ] NO

## 2022-02-28 NOTE — CHART NOTE - NSCHARTNOTEFT_GEN_A_CORE
RFA IABP removed at bedside. Applied 30 minutes of manual pressure. Hemostasis achieved. Area is soft, no hematoma. Patient tolerated procedure well. Bedrest for 6hrs. Follow post-IABP removal protocol.     Tyron Shankar MD  Cardiology Fellow - PGY 4  Text or Call: [phone]  For all New Consults and Questions:  [email or web address]   Login: [email or web address]

## 2022-02-28 NOTE — PROGRESS NOTE ADULT - ASSESSMENT
55M PMHx of alcohol use disorder w/ prior hospitalization for alcohol w/d, TIA (on aspirin), HTN, and uncontrolled IDDM presented with acute onset of typical angina and found to have an inferolateral STEMI w/ 100% occlusion both proximal LCxs and distal OM s/p DESx1. Admitted to CCU for further management.     NEURO:  -Currently A&Ox3  -No active issues    CV:  #Typical angina w. inferolateral wall STEMI (+trops, +EKG changes); s/p IABP  - S/p DESx1 stent to proximal LCx  - TTE indicates severe anterolateral hypokinesis, severe apical hypokinesis, and diaphragmatic akinesis. EF 35%  - DC'ed heparin on 2/28/22 to remove IABP  - DAPT: ASA 81 & Ticagrelor 90mg BID for at least one year  - Continue Losartan 12.5mg QD  - Continue atorvastatin 80mg QD  - Hold off B-blockers  - troponins peaked to >10,000 but downtrending to ~5500    PULM:  - WBC elevated initially; endorsed dry cough prior to admission and subjective fevers as well; shivering upon CCU admission  - CXR appears unremarkable for pneumonia  - WBC continues to downtrend; fever spike overnight  - Continue on current abx    RENAL:  #HOA likely prerenal i.s.o recent MI vs infection vs medications  - SCr 1.52 with unknown baseline during admission; now downtrended to 1.16  - Trend SCr; avoid nephrotoxic meds and renally dose accordingly if need be  - euvolemic on exam; strict I&O's; monitor urine output     GI:  #Transaminitis: likely in setting of recent inferolateral MI and EtOH use history  - Trend LFT's  - DASH/TLC carbs consistent diet     ENDO:  #DM2: HbA1c   - Increase Lantus to 14 units  - Low Insulin Sliding Scale    HEMATOLOGIC:  #mild normochromic normocytic anemia in setting of recent inferolateral STEMI Hgb 12.9  - monitor for now    #DVT prophylaxis  - DC'ed heparin overnight to remove IABP today    ID:  #SIRS+ w. leukocytosis (~15) and fever 101.6F i.s.o inferolateral STEMI  - downtrending WBC 7.58 from 11.5 likely MI and/or possible infection  - CXR appears unremarkable  - UA unremarkable; f/u Bcx  - Cont w. vancomycin (2/25- ); dc-ed Zosyn (2/25-2/26); consider DC vanc once IABP removed  - Continue cefepime (2/26-3/2); dc-ed azithromycin (2/26-2/27)    - f.u vanc trough     SKIN:  #Lines/tubes: IV peripheral access and IABP    Dispo:  -pending clinical improvement 55M PMHx of alcohol use disorder w/ prior hospitalization for alcohol w/d, TIA (on aspirin), HTN, and uncontrolled IDDM presented with acute onset of typical angina and found to have an inferolateral STEMI w/ 100% occlusion both proximal LCxs and distal OM s/p DESx1. Admitted to CCU for further management.     NEURO:  -Currently A&Ox3  -No active issues    CV:  #Typical angina w. inferolateral wall STEMI (+trops, +EKG changes); s/p IABP  - S/p DESx1 stent to proximal LCx  - TTE indicates severe anterolateral hypokinesis, severe apical hypokinesis, and diaphragmatic akinesis. EF 35%  - DC'ed heparin on 2/28/22 to remove IABP  - DAPT: ASA 81 & Ticagrelor 90mg BID for at least one year  - Continue Losartan 12.5mg QD  - Continue atorvastatin 80mg QD  - Hold off B-blockers  - troponins peaked to >10,000 but downtrending to ~5500    PULM:  - WBC elevated initially; endorsed dry cough prior to admission and subjective fevers as well; shivering upon CCU admission  - CXR appears unremarkable for pneumonia  - WBC continues to downtrend; fever spike overnight  - Continue on current abx    RENAL:  #HOA likely prerenal i.s.o recent MI vs infection vs medications  - SCr 1.52 with unknown baseline during admission; now downtrended to 1.16  - Trend SCr; avoid nephrotoxic meds and renally dose accordingly if need be  - euvolemic on exam; strict I&O's; monitor urine output     GI:  #Transaminitis: likely in setting of recent inferolateral MI and EtOH use history  - resolved  - DASH/TLC carbs consistent diet     ENDO:  #DM2: HbA1c   - Increase Lantus to 14 units  - Low Insulin Sliding Scale    HEMATOLOGIC:  #mild normochromic normocytic anemia in setting of recent inferolateral STEMI Hgb 12.9  - monitor for now    #DVT prophylaxis  - DC'ed heparin overnight to remove IABP today    ID:  #SIRS+ w. leukocytosis (~15) and fever 101.6F i.s.o inferolateral STEMI  - downtrending WBC 7.58 from 11.5 likely MI and/or possible infection  - CXR appears unremarkable  - UA unremarkable; f/u Bcx  - Cont w. vancomycin (2/25- ); dc-ed Zosyn (2/25-2/26); consider DC vanc once IABP removed  - Continue cefepime (2/26-3/2); dc-ed azithromycin (2/26-2/27)    - f.u vanc trough     SKIN:  #Lines/tubes: IV peripheral access and IABP    Dispo:  -pending clinical improvement 55M PMHx of alcohol use disorder w/ prior hospitalization for alcohol w/d, TIA (on aspirin), HTN, and uncontrolled IDDM presented with acute onset of typical angina and found to have an inferolateral STEMI w/ 100% occlusion both proximal LCxs and distal OM s/p DESx1. Admitted to CCU for further management.     NEURO:  -Currently A&Ox3  -No active issues    CV:  #Typical angina w. inferolateral wall STEMI (+trops, +EKG changes); s/p IABP  - S/p DESx1 stent to proximal LCx  - TTE indicates severe anterolateral hypokinesis, severe apical hypokinesis, and diaphragmatic akinesis. EF 35%  - DC'ed heparin on 2/28/22 to remove IABP  - DAPT: ASA 81 & Ticagrelor 90mg BID for at least one year  - Continue Losartan 12.5mg QD  - Continue atorvastatin 80mg QD  - Start metoprolol tartrate 25mg BID  - Troponins peaked to >10,000 but downtrending to ~5500    PULM:  - WBC elevated initially; endorsed dry cough prior to admission and subjective fevers as well; shivering upon CCU admission  - CXR w/ L upper lobe opacity, ?pneumonia  - WBC continues to downtrend; fever spike overnight  - Continue on current abx    RENAL:  #HOA likely prerenal i.s.o recent MI vs infection vs medications  - SCr 1.52 with unknown baseline during admission; now downtrended to 1.16  - Trend SCr; avoid nephrotoxic meds and renally dose accordingly if need be  - euvolemic on exam; strict I&O's; monitor urine output     GI:  - no active issues  - Diet: DASH/TLC carbs consistent diet     ENDO:  #DM2: HbA1c   - Increase Lantus to 15 units  - Low Insulin Sliding Scale    HEMATOLOGIC:  #mild normochromic normocytic anemia in setting of recent inferolateral STEMI Hgb 12.9  - monitor for now    #DVT prophylaxis  - DC'ed heparin overnight to remove IABP today  - Start heparin SQ once IABP removed    ID:  #SIRS+ w. leukocytosis (~15) and fever 101.6F i.s.o inferolateral STEMI  - downtrending WBC 7.58 from 9.58 likely MI and/or possible infection  - CXR w/ possible L upper lobe opacity, ?PNA  - UA unremarkable; Bcx NGTD  - Cont w. vancomycin (2/25- ); dc-ed Zosyn (2/25-2/26)  - Continue cefepime (2/26-3/2); dc-ed azithromycin (2/26-2/27)    - Consider ID consult if fever does not resolve    SKIN:  #Lines/tubes: IV peripheral access and IABP    Dispo:  -pending clinical improvement

## 2022-03-01 LAB
ALBUMIN SERPL ELPH-MCNC: 3.3 G/DL — SIGNIFICANT CHANGE UP (ref 3.3–5)
ALP SERPL-CCNC: 57 U/L — SIGNIFICANT CHANGE UP (ref 40–120)
ALT FLD-CCNC: 35 U/L — SIGNIFICANT CHANGE UP (ref 4–41)
ANION GAP SERPL CALC-SCNC: 14 MMOL/L — SIGNIFICANT CHANGE UP (ref 7–14)
AST SERPL-CCNC: 27 U/L — SIGNIFICANT CHANGE UP (ref 4–40)
BILIRUB SERPL-MCNC: 0.4 MG/DL — SIGNIFICANT CHANGE UP (ref 0.2–1.2)
BUN SERPL-MCNC: 17 MG/DL — SIGNIFICANT CHANGE UP (ref 7–23)
CALCIUM SERPL-MCNC: 9 MG/DL — SIGNIFICANT CHANGE UP (ref 8.4–10.5)
CHLORIDE SERPL-SCNC: 99 MMOL/L — SIGNIFICANT CHANGE UP (ref 98–107)
CO2 SERPL-SCNC: 21 MMOL/L — LOW (ref 22–31)
CREAT SERPL-MCNC: 1.1 MG/DL — SIGNIFICANT CHANGE UP (ref 0.5–1.3)
EGFR: 80 ML/MIN/1.73M2 — SIGNIFICANT CHANGE UP
GLUCOSE BLDC GLUCOMTR-MCNC: 130 MG/DL — HIGH (ref 70–99)
GLUCOSE BLDC GLUCOMTR-MCNC: 223 MG/DL — HIGH (ref 70–99)
GLUCOSE BLDC GLUCOMTR-MCNC: 250 MG/DL — HIGH (ref 70–99)
GLUCOSE BLDC GLUCOMTR-MCNC: 267 MG/DL — HIGH (ref 70–99)
GLUCOSE BLDC GLUCOMTR-MCNC: 284 MG/DL — HIGH (ref 70–99)
GLUCOSE BLDC GLUCOMTR-MCNC: 311 MG/DL — HIGH (ref 70–99)
GLUCOSE SERPL-MCNC: 213 MG/DL — HIGH (ref 70–99)
HCT VFR BLD CALC: 36.1 % — LOW (ref 39–50)
HGB BLD-MCNC: 12.4 G/DL — LOW (ref 13–17)
MAGNESIUM SERPL-MCNC: 1.9 MG/DL — SIGNIFICANT CHANGE UP (ref 1.6–2.6)
MCHC RBC-ENTMCNC: 29.5 PG — SIGNIFICANT CHANGE UP (ref 27–34)
MCHC RBC-ENTMCNC: 34.3 GM/DL — SIGNIFICANT CHANGE UP (ref 32–36)
MCV RBC AUTO: 85.7 FL — SIGNIFICANT CHANGE UP (ref 80–100)
MRSA PCR RESULT.: SIGNIFICANT CHANGE UP
NRBC # BLD: 0 /100 WBCS — SIGNIFICANT CHANGE UP
NRBC # FLD: 0 K/UL — SIGNIFICANT CHANGE UP
PHOSPHATE SERPL-MCNC: 3.5 MG/DL — SIGNIFICANT CHANGE UP (ref 2.5–4.5)
PLATELET # BLD AUTO: 211 K/UL — SIGNIFICANT CHANGE UP (ref 150–400)
POTASSIUM SERPL-MCNC: 4.3 MMOL/L — SIGNIFICANT CHANGE UP (ref 3.5–5.3)
POTASSIUM SERPL-SCNC: 4.3 MMOL/L — SIGNIFICANT CHANGE UP (ref 3.5–5.3)
PROT SERPL-MCNC: 6.1 G/DL — SIGNIFICANT CHANGE UP (ref 6–8.3)
RBC # BLD: 4.21 M/UL — SIGNIFICANT CHANGE UP (ref 4.2–5.8)
RBC # FLD: 12.8 % — SIGNIFICANT CHANGE UP (ref 10.3–14.5)
S AUREUS DNA NOSE QL NAA+PROBE: DETECTED
SODIUM SERPL-SCNC: 134 MMOL/L — LOW (ref 135–145)
WBC # BLD: 6.96 K/UL — SIGNIFICANT CHANGE UP (ref 3.8–10.5)
WBC # FLD AUTO: 6.96 K/UL — SIGNIFICANT CHANGE UP (ref 3.8–10.5)

## 2022-03-01 PROCEDURE — 93306 TTE W/DOPPLER COMPLETE: CPT | Mod: 26

## 2022-03-01 PROCEDURE — 71250 CT THORAX DX C-: CPT | Mod: 26

## 2022-03-01 PROCEDURE — 99233 SBSQ HOSP IP/OBS HIGH 50: CPT

## 2022-03-01 RX ORDER — FUROSEMIDE 40 MG
40 TABLET ORAL ONCE
Refills: 0 | Status: COMPLETED | OUTPATIENT
Start: 2022-03-01 | End: 2022-03-01

## 2022-03-01 RX ADMIN — Medication 1 TABLET(S): at 11:29

## 2022-03-01 RX ADMIN — HEPARIN SODIUM 5000 UNIT(S): 5000 INJECTION INTRAVENOUS; SUBCUTANEOUS at 05:46

## 2022-03-01 RX ADMIN — Medication 250 MILLIGRAM(S): at 05:45

## 2022-03-01 RX ADMIN — LOSARTAN POTASSIUM 25 MILLIGRAM(S): 100 TABLET, FILM COATED ORAL at 05:46

## 2022-03-01 RX ADMIN — Medication 6: at 11:43

## 2022-03-01 RX ADMIN — Medication 25 MILLIGRAM(S): at 05:46

## 2022-03-01 RX ADMIN — HEPARIN SODIUM 5000 UNIT(S): 5000 INJECTION INTRAVENOUS; SUBCUTANEOUS at 13:37

## 2022-03-01 RX ADMIN — Medication 81 MILLIGRAM(S): at 11:29

## 2022-03-01 RX ADMIN — CEFEPIME 100 MILLIGRAM(S): 1 INJECTION, POWDER, FOR SOLUTION INTRAMUSCULAR; INTRAVENOUS at 17:00

## 2022-03-01 RX ADMIN — TICAGRELOR 90 MILLIGRAM(S): 90 TABLET ORAL at 17:00

## 2022-03-01 RX ADMIN — CEFEPIME 100 MILLIGRAM(S): 1 INJECTION, POWDER, FOR SOLUTION INTRAMUSCULAR; INTRAVENOUS at 05:45

## 2022-03-01 RX ADMIN — Medication 4: at 16:20

## 2022-03-01 RX ADMIN — ATORVASTATIN CALCIUM 80 MILLIGRAM(S): 80 TABLET, FILM COATED ORAL at 22:22

## 2022-03-01 RX ADMIN — INSULIN GLARGINE 15 UNIT(S): 100 INJECTION, SOLUTION SUBCUTANEOUS at 22:22

## 2022-03-01 RX ADMIN — CHLORHEXIDINE GLUCONATE 1 APPLICATION(S): 213 SOLUTION TOPICAL at 05:47

## 2022-03-01 RX ADMIN — TICAGRELOR 90 MILLIGRAM(S): 90 TABLET ORAL at 05:46

## 2022-03-01 RX ADMIN — Medication 100 MILLIGRAM(S): at 11:29

## 2022-03-01 RX ADMIN — Medication 6: at 07:46

## 2022-03-01 RX ADMIN — Medication 25 MILLIGRAM(S): at 17:00

## 2022-03-01 RX ADMIN — Medication 1 MILLIGRAM(S): at 11:29

## 2022-03-01 RX ADMIN — Medication 40 MILLIGRAM(S): at 13:25

## 2022-03-01 RX ADMIN — HEPARIN SODIUM 5000 UNIT(S): 5000 INJECTION INTRAVENOUS; SUBCUTANEOUS at 22:22

## 2022-03-01 NOTE — PROGRESS NOTE ADULT - SUBJECTIVE AND OBJECTIVE BOX
Patient is a 56y old  Male who presents with a chief complaint of STEMI (2022 08:36)     INTERVAL HPI/OVERNIGHT EVENTS:   Tmax = 100.8 at 8pm otherwise <100 all night.    Hemodynamically stable     Subjective:      ICU Vital Signs Last 24 Hrs  T(C): 37.6 (01 Mar 2022 08:00), Max: 38.2 (2022 20:00)  T(F): 99.6 (01 Mar 2022 08:00), Max: 100.8 (2022 20:00)  HR: 78 (01 Mar 2022 05:00) (71 - 97)  BP: 125/83 (01 Mar 2022 05:00) (110/81 - 132/91)  BP(mean): 95 (01 Mar 2022 05:00) (85 - 107)  ABP: --  ABP(mean): --  RR: 19 (01 Mar 2022 05:00) (16 - 30)  SpO2: 94% (01 Mar 2022 05:00) (94% - 100%)    I&O's Summary    2022 07:01  -  01 Mar 2022 07:00  --------------------------------------------------------  IN: 1100 mL / OUT: 1250 mL / NET: -150 mL    Daily     Daily Weight in k.3 (01 Mar 2022 08:00)    Adult Advanced Hemodynamics Last 24 Hrs  CVP(mm Hg): --  CVP(cm H2O): --  CO: --  CI: --  PA: --  PA(mean): --  PCWP: --  SVR: --  SVRI: --  PVR: --  PVRI: --    EKG/Telemetry Events: sinus rhythm    MEDICATIONS  (STANDING):  aspirin enteric coated 81 milliGRAM(s) Oral daily  atorvastatin 80 milliGRAM(s) Oral at bedtime  cefepime   IVPB 1000 milliGRAM(s) IV Intermittent every 12 hours  chlorhexidine 4% Liquid 1 Application(s) Topical <User Schedule>  dextrose 40% Gel 15 Gram(s) Oral once  dextrose 5%. 1000 milliLiter(s) (50 mL/Hr) IV Continuous <Continuous>  dextrose 5%. 1000 milliLiter(s) (100 mL/Hr) IV Continuous <Continuous>  dextrose 50% Injectable 12.5 Gram(s) IV Push once  dextrose 50% Injectable 25 Gram(s) IV Push once  dextrose 50% Injectable 25 Gram(s) IV Push once  folic acid 1 milliGRAM(s) Oral daily  glucagon  Injectable 1 milliGRAM(s) IntraMuscular once  heparin   Injectable 5000 Unit(s) SubCutaneous every 8 hours  insulin glargine Injectable (LANTUS) 15 Unit(s) SubCutaneous at bedtime  insulin lispro (ADMELOG) corrective regimen sliding scale   SubCutaneous three times a day before meals  insulin lispro (ADMELOG) corrective regimen sliding scale   SubCutaneous at bedtime  losartan 25 milliGRAM(s) Oral daily  metoprolol tartrate 25 milliGRAM(s) Oral two times a day  multivitamin 1 Tablet(s) Oral daily  thiamine 100 milliGRAM(s) Oral daily  ticagrelor 90 milliGRAM(s) Oral every 12 hours  vancomycin  IVPB 1000 milliGRAM(s) IV Intermittent every 12 hours    MEDICATIONS  (PRN):  acetaminophen     Tablet .. 650 milliGRAM(s) Oral every 6 hours PRN Temp greater or equal to 38C (100.4F), Moderate Pain (4 - 6)    PHYSICAL EXAM:  GENERAL: nontoxic-appearing male in no acute distress  HEAD: Atraumatic, Normocephalic  EYES: conjunctiva and sclera clear  ENT: Moist mucous membranes  NECK: Supple, No JVD; no palpable pre-auricular, post-auricular, occipital, mandibular, submental, supra-clavicular, or infra-clavicular lymph nodes   CHEST/LUNG: CTAB; No rales, rhonchi, wheezing, or rubs. Unlabored respirations  HEART: RRR; nl S1 and S2; currently w/ IABP  ABDOMEN: Soft, Nontender to light and deep palpation, Nondistended but obese abdomen  EXTREMITIES: 2+ Peripheral radial pulses, brisk capillary refill. No clubbing, cyanosis, or bilateral LE edema; R groin without signs of infection or hematoma   NERVOUS SYSTEM: Alert & Oriented x3, speech clear  SKIN: No rashes or lesions    LABS:                        12.4   6.96  )-----------( 211      ( 01 Mar 2022 06:55 )             36.1     03-01    134<L>  |  99  |  17  ----------------------------<  213<H>  4.3   |  21<L>  |  1.10    Ca    9.0      01 Mar 2022 06:55  Phos  3.5       Mg     1.90         TPro  6.1  /  Alb  3.3  /  TBili  0.4  /  DBili  x   /  AST  27  /  ALT  35  /  AlkPhos  57      LIVER FUNCTIONS - ( 01 Mar 2022 06:55 )  Alb: 3.3 g/dL / Pro: 6.1 g/dL / ALK PHOS: 57 U/L / ALT: 35 U/L / AST: 27 U/L / GGT: x           PTT - ( 2022 01:53 )  PTT:64.7 sec    CAPILLARY BLOOD GLUCOSE  POCT Blood Glucose.: 267 mg/dL (01 Mar 2022 07:43)  POCT Blood Glucose.: 320 mg/dL (2022 21:23)  POCT Blood Glucose.: 217 mg/dL (2022 16:52)  POCT Blood Glucose.: 253 mg/dL (2022 11:42)    CARDIAC MARKERS ( 2022 01:53 )  x     / x     / 299 U/L / x     / 4.1 ng/mL      RADIOLOGY & ADDITIONAL TESTS:  no new imaging      Care Discussed with Consultants/Other Providers [ x] YES  [ ] NO         Patient is a 56y old  Male who presents with a chief complaint of STEMI (2022 08:36)     INTERVAL HPI/OVERNIGHT EVENTS:   Tmax = 100.8 at 8pm otherwise <100 all night.    Hemodynamically stable     Subjective:  Pt feeling well this AM. Endorses SOB if "talking for a long time" or on exertion. Otherwise, denies f/c, cp, palpitations, leg swelling.    ICU Vital Signs Last 24 Hrs  T(C): 37.6 (01 Mar 2022 08:00), Max: 38.2 (2022 20:00)  T(F): 99.6 (01 Mar 2022 08:00), Max: 100.8 (2022 20:00)  HR: 78 (01 Mar 2022 05:00) (71 - 97)  BP: 125/83 (01 Mar 2022 05:00) (110/81 - 132/91)  BP(mean): 95 (01 Mar 2022 05:00) (85 - 107)  ABP: --  ABP(mean): --  RR: 19 (01 Mar 2022 05:00) (16 - 30)  SpO2: 94% (01 Mar 2022 05:00) (94% - 100%)    I&O's Summary    2022 07:01  -  01 Mar 2022 07:00  --------------------------------------------------------  IN: 1100 mL / OUT: 1250 mL / NET: -150 mL    Daily     Daily Weight in k.3 (01 Mar 2022 08:00)    Adult Advanced Hemodynamics Last 24 Hrs  CVP(mm Hg): --  CVP(cm H2O): --  CO: --  CI: --  PA: --  PA(mean): --  PCWP: --  SVR: --  SVRI: --  PVR: --  PVRI: --    EKG/Telemetry Events: sinus rhythm    MEDICATIONS  (STANDING):  aspirin enteric coated 81 milliGRAM(s) Oral daily  atorvastatin 80 milliGRAM(s) Oral at bedtime  cefepime   IVPB 1000 milliGRAM(s) IV Intermittent every 12 hours  chlorhexidine 4% Liquid 1 Application(s) Topical <User Schedule>  dextrose 40% Gel 15 Gram(s) Oral once  dextrose 5%. 1000 milliLiter(s) (50 mL/Hr) IV Continuous <Continuous>  dextrose 5%. 1000 milliLiter(s) (100 mL/Hr) IV Continuous <Continuous>  dextrose 50% Injectable 12.5 Gram(s) IV Push once  dextrose 50% Injectable 25 Gram(s) IV Push once  dextrose 50% Injectable 25 Gram(s) IV Push once  folic acid 1 milliGRAM(s) Oral daily  glucagon  Injectable 1 milliGRAM(s) IntraMuscular once  heparin   Injectable 5000 Unit(s) SubCutaneous every 8 hours  insulin glargine Injectable (LANTUS) 15 Unit(s) SubCutaneous at bedtime  insulin lispro (ADMELOG) corrective regimen sliding scale   SubCutaneous three times a day before meals  insulin lispro (ADMELOG) corrective regimen sliding scale   SubCutaneous at bedtime  losartan 25 milliGRAM(s) Oral daily  metoprolol tartrate 25 milliGRAM(s) Oral two times a day  multivitamin 1 Tablet(s) Oral daily  thiamine 100 milliGRAM(s) Oral daily  ticagrelor 90 milliGRAM(s) Oral every 12 hours  vancomycin  IVPB 1000 milliGRAM(s) IV Intermittent every 12 hours    MEDICATIONS  (PRN):  acetaminophen     Tablet .. 650 milliGRAM(s) Oral every 6 hours PRN Temp greater or equal to 38C (100.4F), Moderate Pain (4 - 6)    PHYSICAL EXAM:  GENERAL: nontoxic-appearing male in no acute distress  HEAD: Atraumatic, Normocephalic  EYES: conjunctiva and sclera clear  ENT: Moist mucous membranes  NECK: Supple, No JVD; no palpable pre-auricular, post-auricular, occipital, mandibular, submental, supra-clavicular, or infra-clavicular lymph nodes   CHEST/LUNG: CTAB; No rales, rhonchi, wheezing, or rubs. Unlabored respirations  HEART: RRR; nl S1 and S2; currently w/ IABP  ABDOMEN: Soft, Nontender to light and deep palpation, Nondistended but obese abdomen  EXTREMITIES: 2+ Peripheral radial pulses, brisk capillary refill. No clubbing, cyanosis, or bilateral LE edema; R groin without signs of infection or hematoma   NERVOUS SYSTEM: Alert & Oriented x3, speech clear  SKIN: No rashes or lesions    LABS:                        12.4   6.96  )-----------( 211      ( 01 Mar 2022 06:55 )             36.1     03-    134<L>  |  99  |  17  ----------------------------<  213<H>  4.3   |  21<L>  |  1.10    Ca    9.0      01 Mar 2022 06:55  Phos  3.5     03-  Mg     1.90     -    TPro  6.1  /  Alb  3.3  /  TBili  0.4  /  DBili  x   /  AST  27  /  ALT  35  /  AlkPhos  57  03-    LIVER FUNCTIONS - ( 01 Mar 2022 06:55 )  Alb: 3.3 g/dL / Pro: 6.1 g/dL / ALK PHOS: 57 U/L / ALT: 35 U/L / AST: 27 U/L / GGT: x           PTT - ( 2022 01:53 )  PTT:64.7 sec    CAPILLARY BLOOD GLUCOSE  POCT Blood Glucose.: 267 mg/dL (01 Mar 2022 07:43)  POCT Blood Glucose.: 320 mg/dL (2022 21:23)  POCT Blood Glucose.: 217 mg/dL (2022 16:52)  POCT Blood Glucose.: 253 mg/dL (2022 11:42)    CARDIAC MARKERS ( 2022 01:53 )  x     / x     / 299 U/L / x     / 4.1 ng/mL      RADIOLOGY & ADDITIONAL TESTS:  no new imaging      Care Discussed with Consultants/Other Providers [ x] YES  [ ] NO

## 2022-03-01 NOTE — PROGRESS NOTE ADULT - ASSESSMENT
55M PMHx of alcohol use disorder w/ prior hospitalization for alcohol w/d, TIA (on aspirin), HTN, and uncontrolled IDDM presented with acute onset of typical angina and found to have an inferolateral STEMI w/ 100% occlusion both proximal LCxs and distal OM s/p DESx1. Admitted to CCU for further management.     NEURO:  -Currently A&Ox3  -No active issues    CV:  #Typical angina w. inferolateral wall STEMI (+trops, +EKG changes); s/p IABP  - S/p DESx1 stent to proximal LCx  - TTE indicates severe anterolateral hypokinesis, severe apical hypokinesis, and diaphragmatic akinesis. EF 35%  - Troponins peaked to >10,000 but downtrending to ~5500  - DC'ed heparin on 2/28/22 to remove IABP  - DAPT: ASA 81 & Ticagrelor 90mg BID for at least one year  - Continue Losartan 25mg QD  - Continue atorvastatin 80mg QD  - Continue metoprolol tartrate 25mg BID    PULM:  - WBC elevated initially; endorsed dry cough prior to admission and subjective fevers as well; shivering upon CCU admission  - CXR w/ L upper lobe opacity, ?pneumonia  - WBC continues to downtrend; low-grade fever overnight  - Continue on current abx  - Obtain non-con CT chest to further evaluate CXR finding    RENAL:  #HOA likely prerenal i.s.o recent MI vs infection vs medications  - Resolved  - SCr 1.52 with unknown baseline during admission; now downtrended to 1.1  - euvolemic on exam; strict I&O's; monitor urine output     GI:  - no active issues  - Diet: DASH/TLC carbs consistent diet     ENDO:  #DM2: HbA1c   - Continue w/ Lantus 15 units  - Low Insulin Sliding Scale    HEMATOLOGIC:  #mild normochromic normocytic anemia in setting of recent inferolateral STEMI Hgb 12.9  - monitor for now    #DVT prophylaxis  - Heparin SQ    ID:  #SIRS+ w. leukocytosis (~15) and fever 101.6F i.s.o inferolateral STEMI  - downtrending WBC 6.96 from 7.58 likely MI and/or possible infection  - CXR w/ possible L upper lobe opacity, ?PNA  - UA unremarkable; Bcx NGTD  - Cont w. vancomycin (2/25- ); dc-ed Zosyn (2/25-2/26)  - Continue cefepime (2/26-3/2); dc-ed azithromycin (2/26-2/27)    - Obtain non-con CT chest for further eval    SKIN:  #Lines/tubes: IV peripheral access 55M PMHx of alcohol use disorder w/ prior hospitalization for alcohol w/d, TIA (on aspirin), HTN, and uncontrolled IDDM presented with acute onset of typical angina and found to have an inferolateral STEMI w/ 100% occlusion both proximal LCxs and distal OM s/p DESx1. Admitted to CCU for further management.     NEURO:  -Currently A&Ox3  -No active issues    CV:  #Typical angina w. inferolateral wall STEMI (+trops, +EKG changes); s/p IABP  - S/p DESx1 stent to proximal LCx  - TTE indicates severe anterolateral hypokinesis, severe apical hypokinesis, and diaphragmatic akinesis. EF 35%  - Troponins peaked to >10,000 but downtrending to ~5500  - DC'ed heparin on 2/28/22 to remove IABP  - DAPT: ASA 81 & Ticagrelor 90mg BID for at least one year  - Continue Losartan 25mg QD  - Continue atorvastatin 80mg QD  - Continue metoprolol tartrate 25mg BID    PULM:  - WBC elevated initially; endorsed dry cough prior to admission and subjective fevers as well; shivering upon CCU admission  - CXR w/ L upper lobe opacity, ?pneumonia  - WBC continues to downtrend; low-grade fever overnight  - Continue on cefepime and vanc  - Obtain non-con CT chest to further evaluate CXR finding    RENAL:  #HOA likely prerenal i.s.o recent MI vs infection vs medications  - Resolved  - SCr 1.52 with unknown baseline during admission; now downtrended to 1.1  - euvolemic on exam; strict I&O's; monitor urine output     GI:  - no active issues  - Diet: DASH/TLC carbs consistent diet     ENDO:  #DM2: HbA1c   - Continue w/ Lantus 15 units  - Low Insulin Sliding Scale    HEMATOLOGIC:  #mild normochromic normocytic anemia in setting of recent inferolateral STEMI Hgb 12.9  - monitor for now    #DVT prophylaxis  - Heparin SQ    ID:  #SIRS+ w. leukocytosis (~15) and fever 101.6F i.s.o inferolateral STEMI  - downtrending WBC 6.96 from 7.58 likely MI and/or possible infection  - CXR w/ possible L upper lobe opacity, ?PNA  - UA unremarkable; Bcx NGTD  - Cont w. vancomycin (2/25- ); dc-ed Zosyn (2/25-2/26)  - Continue cefepime (2/26-3/2); dc-ed azithromycin (2/26-2/27)    - Obtain non-con CT chest for further eval    SKIN:  #Lines/tubes: IV peripheral access 55M PMHx of alcohol use disorder w/ prior hospitalization for alcohol w/d, TIA (on aspirin), HTN, and uncontrolled IDDM presented with acute onset of typical angina and found to have an inferolateral STEMI w/ 100% occlusion both proximal LCxs and distal OM s/p DESx1. Admitted to CCU for further management.     NEURO:  -Currently A&Ox3  -No active issues    CV:  #Typical angina w. inferolateral wall STEMI (+trops, +EKG changes); s/p IABP  - S/p DESx1 stent to proximal LCx  - TTE indicates severe anterolateral hypokinesis, severe apical hypokinesis, and diaphragmatic akinesis. EF 35%  - Troponins peaked to >10,000 but downtrending to ~5500  - DC'ed heparin on 2/28/22 and now s/p IABP  - DAPT: ASA 81 & Ticagrelor 90mg BID for at least one year  - Continue Losartan 25mg QD  - Continue atorvastatin 80mg QD  - Continue metoprolol tartrate 25mg BID    PULM:  - WBC elevated initially; endorsed dry cough prior to admission and subjective fevers as well; shivering upon CCU admission  - CXR w/ L upper lobe opacity, ?pneumonia  - WBC continues to downtrend; low-grade fever overnight  - Continue on cefepime; DC'ed vanc 3/1/22 (MRSA negative)   - non-con CT w/ pleural effusions; Lasix 40mg IV x1 3/1/22    RENAL:  #HOA likely prerenal i.s.o recent MI vs infection vs medications  - Resolved  - SCr 1.52 with unknown baseline during admission; now downtrended to 1.1  - euvolemic on exam; strict I&O's; monitor urine output     GI:  - no active issues  - Diet: DASH/TLC carbs consistent diet     ENDO:  #DM2: HbA1c   - Continue w/ Lantus 15 units  - Low Insulin Sliding Scale    HEMATOLOGIC:  #mild normochromic normocytic anemia in setting of recent inferolateral STEMI Hgb 12.9  - monitor for now    #DVT prophylaxis  - Heparin SQ    ID:  #SIRS+ w. leukocytosis (~15) and fever 101.6F i.s.o inferolateral STEMI  - downtrending WBC 6.96 from 7.58 likely MI and/or possible infection  - CXR w/ possible L upper lobe opacity, ?PNA  - UA unremarkable; Bcx NGTD  - DC'ed vancomycin (2/25-3/1), Zosyn (2/25-2/26), and azithromycin (2/26-2/27)    - Continue cefepime (2/26-3/2)  - f/u final CT read    SKIN:  #Lines/tubes: IV peripheral access 55M PMHx of alcohol use disorder w/ prior hospitalization for alcohol w/d, TIA (on aspirin), HTN, and uncontrolled IDDM presented with acute onset of typical angina and found to have an inferolateral STEMI w/ 100% occlusion both proximal LCxs and distal OM s/p DESx1. Admitted to CCU for further management.     NEURO:  -Currently A&Ox3  -No active issues    CV:  #Typical angina w. inferolateral wall STEMI (+trops, +EKG changes); s/p IABP  - S/p DESx1 stent to proximal LCx  - TTE indicates severe anterolateral hypokinesis, severe apical hypokinesis, and diaphragmatic akinesis. EF 35%  - Troponins peaked to >10,000 but downtrending to ~5500  - DC'ed heparin on 2/28/22 and now s/p IABP  - DAPT: ASA 81 & Ticagrelor 90mg BID for at least one year  - Continue Losartan 25mg QD  - Continue atorvastatin 80mg QD  - Continue metoprolol tartrate 25mg BID  - f/u repeat ECHO    PULM:  - WBC elevated initially; endorsed dry cough prior to admission and subjective fevers as well; shivering upon CCU admission  - CXR w/ L upper lobe opacity, ?pneumonia  - WBC continues to downtrend; low-grade fever overnight  - Continue on cefepime; DC'ed vanc 3/1/22 (MRSA negative)   - non-con CT w/ pleural effusions; Lasix 40mg IV x1 3/1/22    RENAL:  #HOA likely prerenal i.s.o recent MI vs infection vs medications  - Resolved  - SCr 1.52 with unknown baseline during admission; now downtrended to 1.1  - euvolemic on exam; strict I&O's; monitor urine output     GI:  - no active issues  - Diet: DASH/TLC carbs consistent diet     ENDO:  #DM2: HbA1c   - Continue w/ Lantus 15 units  - Low Insulin Sliding Scale    HEMATOLOGIC:  #mild normochromic normocytic anemia in setting of recent inferolateral STEMI Hgb 12.9  - monitor for now    #DVT prophylaxis  - Heparin SQ    ID:  #SIRS+ w. leukocytosis (~15) and fever 101.6F i.s.o inferolateral STEMI  - downtrending WBC 6.96 from 7.58 likely MI and/or possible infection  - CXR w/ possible L upper lobe opacity, ?PNA  - UA unremarkable; Bcx NGTD  - DC'ed vancomycin (2/25-3/1), Zosyn (2/25-2/26), and azithromycin (2/26-2/27)    - Continue cefepime (2/26-3/2)  - f/u final CT read    SKIN:  #Lines/tubes: IV peripheral access

## 2022-03-01 NOTE — CHART NOTE - NSCHARTNOTEFT_GEN_A_CORE
MAR Accept Note  Transfer to:    Accepting Attending Physician: Dr. Veronica  Assigned Room:  460A    Patient seen and examined.   Labs and data reviewed.   No findings precluding transfer of service.       HPI/CCU COURSE:   Please refer to CCU transfer note for full details. Briefly, this is a 55M PMHx of alcohol use disorder w/ prior hospitalization for alcohol w/d, TIA (on aspirin), HTN, and uncontrolled IDDM presented with acute onset of typical angina and found to have an inferolateral STEMI s/p BECKY to pCX  100% occlusion. The very distal segment of this vessel was 100% occluded possibly secondary to distal embolization. An IABP was placed for perfusion and pt admitted to CCU for further management. IABP Dc'd 2/28. Pt's course was c/b fevers of unknown origin. Pt placed on cefepime, azithromycin, and vancomycin empirically. Blood cultures negative, UA, urine legionella negative, MRSA negative. CT scan negative for collections. azithromycin, and vancomycin discontinued and empiric course of cefepime continued. Pt remains with low grade temps. WBC normalized. Pt on GDMT and ready for transfer to Medicine.      FOR FOLLOW-UP:  [ ] f/u card regarding med optimization  [ ] f/u infectious w/u for low grade temps; currently on empiric abx    Micki Katz, PGY3  Internal medicine

## 2022-03-01 NOTE — CHART NOTE - NSCHARTNOTEFT_GEN_A_CORE
55M PMHx of alcohol use disorder w/ prior hospitalization for alcohol w/d, TIA (on aspirin), HTN, and uncontrolled IDDM presented with acute onset of typical angina and found to have an inferolateral STEMI s/p BECKY to pCX  100% occlusion. The very distal segment of this vessel was 100% occluded possibly secondary to distal embolization. An IABP was placed for perfusion and pt admitted to CCU for further management. IABP Dc'd 2/28. Pt's course was c/b fevers of unknown origin. Pt placed on cefepime, azithromycin, and vancomycin empirically. Blood cultures negative, UA, urine legionella negative, MRSA negative. CT scan negative for collections. azithromycin, and vancomycin discontinued and empiric course of cefepime continued. Pt remains with low grade temps. WBC normalized. 55M PMHx of alcohol use disorder w/ prior hospitalization for alcohol w/d, TIA (on aspirin), HTN, and uncontrolled IDDM presented with acute onset of typical angina and found to have an inferolateral STEMI s/p BECKY to pCX  100% occlusion. The very distal segment of this vessel was 100% occluded possibly secondary to distal embolization. An IABP was placed for perfusion and pt admitted to CCU for further management. IABP Dc'd 2/28. Pt's course was c/b fevers of unknown origin. Pt placed on cefepime, azithromycin, and vancomycin empirically. Blood cultures negative, UA, urine legionella negative, MRSA negative. CT scan negative for collections. azithromycin, and vancomycin discontinued and empiric course of cefepime continued. Pt remains with low grade temps. WBC normalized. Pt on GDMT and ready for transfer to Medicine.

## 2022-03-02 ENCOUNTER — TRANSCRIPTION ENCOUNTER (OUTPATIENT)
Age: 57
End: 2022-03-02

## 2022-03-02 DIAGNOSIS — E11.9 TYPE 2 DIABETES MELLITUS WITHOUT COMPLICATIONS: ICD-10-CM

## 2022-03-02 DIAGNOSIS — I21.3 ST ELEVATION (STEMI) MYOCARDIAL INFARCTION OF UNSPECIFIED SITE: ICD-10-CM

## 2022-03-02 DIAGNOSIS — F10.20 ALCOHOL DEPENDENCE, UNCOMPLICATED: ICD-10-CM

## 2022-03-02 DIAGNOSIS — I10 ESSENTIAL (PRIMARY) HYPERTENSION: ICD-10-CM

## 2022-03-02 DIAGNOSIS — I50.23 ACUTE ON CHRONIC SYSTOLIC (CONGESTIVE) HEART FAILURE: ICD-10-CM

## 2022-03-02 LAB
ANION GAP SERPL CALC-SCNC: 13 MMOL/L — SIGNIFICANT CHANGE UP (ref 7–14)
BUN SERPL-MCNC: 24 MG/DL — HIGH (ref 7–23)
CALCIUM SERPL-MCNC: 9.4 MG/DL — SIGNIFICANT CHANGE UP (ref 8.4–10.5)
CHLORIDE SERPL-SCNC: 97 MMOL/L — LOW (ref 98–107)
CO2 SERPL-SCNC: 19 MMOL/L — LOW (ref 22–31)
CREAT SERPL-MCNC: 1.28 MG/DL — SIGNIFICANT CHANGE UP (ref 0.5–1.3)
CULTURE RESULTS: SIGNIFICANT CHANGE UP
CULTURE RESULTS: SIGNIFICANT CHANGE UP
EGFR: 67 ML/MIN/1.73M2 — SIGNIFICANT CHANGE UP
GLUCOSE BLDC GLUCOMTR-MCNC: 268 MG/DL — HIGH (ref 70–99)
GLUCOSE SERPL-MCNC: 277 MG/DL — HIGH (ref 70–99)
HCT VFR BLD CALC: 39.4 % — SIGNIFICANT CHANGE UP (ref 39–50)
HGB BLD-MCNC: 13.4 G/DL — SIGNIFICANT CHANGE UP (ref 13–17)
MAGNESIUM SERPL-MCNC: 1.9 MG/DL — SIGNIFICANT CHANGE UP (ref 1.6–2.6)
MCHC RBC-ENTMCNC: 29.7 PG — SIGNIFICANT CHANGE UP (ref 27–34)
MCHC RBC-ENTMCNC: 34 GM/DL — SIGNIFICANT CHANGE UP (ref 32–36)
MCV RBC AUTO: 87.4 FL — SIGNIFICANT CHANGE UP (ref 80–100)
NRBC # BLD: 0 /100 WBCS — SIGNIFICANT CHANGE UP
NRBC # FLD: 0 K/UL — SIGNIFICANT CHANGE UP
PHOSPHATE SERPL-MCNC: 4 MG/DL — SIGNIFICANT CHANGE UP (ref 2.5–4.5)
PLATELET # BLD AUTO: 281 K/UL — SIGNIFICANT CHANGE UP (ref 150–400)
POTASSIUM SERPL-MCNC: 4.7 MMOL/L — SIGNIFICANT CHANGE UP (ref 3.5–5.3)
POTASSIUM SERPL-SCNC: 4.7 MMOL/L — SIGNIFICANT CHANGE UP (ref 3.5–5.3)
RBC # BLD: 4.51 M/UL — SIGNIFICANT CHANGE UP (ref 4.2–5.8)
RBC # FLD: 12.8 % — SIGNIFICANT CHANGE UP (ref 10.3–14.5)
SODIUM SERPL-SCNC: 129 MMOL/L — LOW (ref 135–145)
SPECIMEN SOURCE: SIGNIFICANT CHANGE UP
SPECIMEN SOURCE: SIGNIFICANT CHANGE UP
WBC # BLD: 8.43 K/UL — SIGNIFICANT CHANGE UP (ref 3.8–10.5)
WBC # FLD AUTO: 8.43 K/UL — SIGNIFICANT CHANGE UP (ref 3.8–10.5)

## 2022-03-02 PROCEDURE — 99233 SBSQ HOSP IP/OBS HIGH 50: CPT

## 2022-03-02 RX ORDER — ISOPROPYL ALCOHOL, BENZOCAINE .7; .06 ML/ML; ML/ML
1 SWAB TOPICAL
Qty: 100 | Refills: 1
Start: 2022-03-02 | End: 2022-04-20

## 2022-03-02 RX ORDER — SACUBITRIL AND VALSARTAN 24; 26 MG/1; MG/1
1 TABLET, FILM COATED ORAL
Refills: 0 | Status: DISCONTINUED | OUTPATIENT
Start: 2022-03-02 | End: 2022-03-03

## 2022-03-02 RX ORDER — SACUBITRIL AND VALSARTAN 24; 26 MG/1; MG/1
1 TABLET, FILM COATED ORAL
Qty: 60 | Refills: 0
Start: 2022-03-02 | End: 2022-03-31

## 2022-03-02 RX ORDER — INSULIN LISPRO 100/ML
4 VIAL (ML) SUBCUTANEOUS
Refills: 0 | Status: DISCONTINUED | OUTPATIENT
Start: 2022-03-02 | End: 2022-03-03

## 2022-03-02 RX ORDER — METOPROLOL TARTRATE 50 MG
50 TABLET ORAL DAILY
Refills: 0 | Status: DISCONTINUED | OUTPATIENT
Start: 2022-03-02 | End: 2022-03-03

## 2022-03-02 RX ORDER — INSULIN LISPRO 100/ML
2 VIAL (ML) SUBCUTANEOUS
Refills: 0 | Status: DISCONTINUED | OUTPATIENT
Start: 2022-03-02 | End: 2022-03-02

## 2022-03-02 RX ORDER — TICAGRELOR 90 MG/1
1 TABLET ORAL
Qty: 60 | Refills: 0
Start: 2022-03-02 | End: 2022-03-31

## 2022-03-02 RX ORDER — INSULIN GLARGINE 100 [IU]/ML
20 INJECTION, SOLUTION SUBCUTANEOUS AT BEDTIME
Refills: 0 | Status: DISCONTINUED | OUTPATIENT
Start: 2022-03-02 | End: 2022-03-03

## 2022-03-02 RX ADMIN — LOSARTAN POTASSIUM 25 MILLIGRAM(S): 100 TABLET, FILM COATED ORAL at 05:04

## 2022-03-02 RX ADMIN — Medication 81 MILLIGRAM(S): at 12:08

## 2022-03-02 RX ADMIN — HEPARIN SODIUM 5000 UNIT(S): 5000 INJECTION INTRAVENOUS; SUBCUTANEOUS at 12:09

## 2022-03-02 RX ADMIN — HEPARIN SODIUM 5000 UNIT(S): 5000 INJECTION INTRAVENOUS; SUBCUTANEOUS at 21:13

## 2022-03-02 RX ADMIN — Medication 25 MILLIGRAM(S): at 05:04

## 2022-03-02 RX ADMIN — INSULIN GLARGINE 20 UNIT(S): 100 INJECTION, SOLUTION SUBCUTANEOUS at 21:14

## 2022-03-02 RX ADMIN — Medication 4 UNIT(S): at 17:54

## 2022-03-02 RX ADMIN — Medication 6: at 12:46

## 2022-03-02 RX ADMIN — Medication 2 UNIT(S): at 12:45

## 2022-03-02 RX ADMIN — Medication 30 MILLILITER(S): at 13:37

## 2022-03-02 RX ADMIN — Medication 50 MILLIGRAM(S): at 12:12

## 2022-03-02 RX ADMIN — ATORVASTATIN CALCIUM 80 MILLIGRAM(S): 80 TABLET, FILM COATED ORAL at 21:13

## 2022-03-02 RX ADMIN — Medication 4: at 17:55

## 2022-03-02 RX ADMIN — CEFEPIME 100 MILLIGRAM(S): 1 INJECTION, POWDER, FOR SOLUTION INTRAMUSCULAR; INTRAVENOUS at 05:02

## 2022-03-02 RX ADMIN — TICAGRELOR 90 MILLIGRAM(S): 90 TABLET ORAL at 17:53

## 2022-03-02 RX ADMIN — HEPARIN SODIUM 5000 UNIT(S): 5000 INJECTION INTRAVENOUS; SUBCUTANEOUS at 05:06

## 2022-03-02 RX ADMIN — SACUBITRIL AND VALSARTAN 1 TABLET(S): 24; 26 TABLET, FILM COATED ORAL at 17:54

## 2022-03-02 RX ADMIN — CHLORHEXIDINE GLUCONATE 1 APPLICATION(S): 213 SOLUTION TOPICAL at 05:23

## 2022-03-02 RX ADMIN — Medication 1 TABLET(S): at 12:07

## 2022-03-02 RX ADMIN — CEFEPIME 100 MILLIGRAM(S): 1 INJECTION, POWDER, FOR SOLUTION INTRAMUSCULAR; INTRAVENOUS at 16:41

## 2022-03-02 RX ADMIN — TICAGRELOR 90 MILLIGRAM(S): 90 TABLET ORAL at 05:04

## 2022-03-02 RX ADMIN — Medication 1 MILLIGRAM(S): at 12:07

## 2022-03-02 RX ADMIN — Medication 6: at 09:02

## 2022-03-02 NOTE — DISCHARGE NOTE PROVIDER - NSDCMRMEDTOKEN_GEN_ALL_CORE_FT
alcohol swabs : Apply topically to affected area 4 times a day   amLODIPine 10 mg oral tablet: 1 tab(s) orally once a day  aspirin 81 mg oral tablet: 1 tab(s) orally once a day  lancets: 1 application subcutaneously 4 times a day   losartan 50 mg oral tablet: 1 tab(s) orally once a day  NovoLIN 70/30 subcutaneous suspension: 30 unit(s) subcutaneous once a day   amLODIPine 10 mg oral tablet: 1 tab(s) orally once a day  aspirin 81 mg oral tablet: 1 tab(s) orally once a day  losartan 50 mg oral tablet: 1 tab(s) orally once a day  NovoLIN 70/30 subcutaneous suspension: 30 unit(s) subcutaneous once a day  sacubitril-valsartan 24 mg-26 mg oral tablet: 1 tab(s) orally 2 times a day    *test coverage; please page [phone] with price; or message on Teams Forrest Ledesma*  ticagrelor 90 mg oral tablet: 1 tab(s) orally every 12 hours    *test coverage; please message Forrest Ledesma on Teams with price or page [phone]*   aspirin 81 mg oral tablet: 1 tab(s) orally once a day  atorvastatin 80 mg oral tablet: 1 tab(s) orally once a day (at bedtime)  folic acid 1 mg oral tablet: 1 tab(s) orally once a day  metoprolol succinate 50 mg oral tablet, extended release: 1 tab(s) orally once a day  Multiple Vitamins oral tablet: 1 tab(s) orally once a day  NovoLIN 70/30 subcutaneous suspension: 30 unit(s) subcutaneous once a day  sacubitril-valsartan 24 mg-26 mg oral tablet: 1 tab(s) orally 2 times a day    *test coverage; please page [phone] with price; or message on Teams Forrest Ledesma*  ticagrelor 90 mg oral tablet: 1 tab(s) orally every 12 hours    *test coverage; please message Forrest Ledesma on Teams with price or page [phone]*

## 2022-03-02 NOTE — DISCHARGE NOTE PROVIDER - NSDCCPCAREPLAN_GEN_ALL_CORE_FT
PRINCIPAL DISCHARGE DIAGNOSIS  Diagnosis: ST elevation MI (STEMI)  Assessment and Plan of Treatment: When you came to the hospital you had a heart attack that required a stent to placed as well as a balloon pump while you were in the cardiac care unit. You should continue medications as prescribed at time of discharge. Please follow up your caridologist, the information for Dr. Escobar is provided below. Please return to the hospital if you have new or worsening chest pain, shortness of breath, fevers, or chills.      SECONDARY DISCHARGE DIAGNOSES  Diagnosis: Type II diabetes mellitus  Assessment and Plan of Treatment: Please conitnue your insulin regimen as prescribed at time of discharge.   - Hypoglycemia Management : Please check your fingersticks every morning or if you are not feeling well.  If your fingerstick is greater than 300 mg/dl x 3 or more readings, please contact your doctor. . If your fingerstick low, <70 mg/dl and/or you have symptoms of very low blood sugar, FIRST drink 1/2 cup of apple juice, (or take 4 glucose tabs/tube of glucose gel) and recheck fingerstick in 15 minutes.  Repeat these steps until blood sugar is above 100 mg/dl, IF NECESSARY.  Then call provider your doctor to discuss low blood sugar.    Diagnosis: Essential hypertension  Assessment and Plan of Treatment: Low sodium and fat diet, continue anti-hypertensive medications, and follow up with primary care physician.

## 2022-03-02 NOTE — CHART NOTE - NSCHARTNOTEFT_GEN_A_CORE
On review of AM labs noted to have Na 129, decrased from 134 on 3/1.  When corrected for serum glucose of 277 corrects to 133. Likely pseudohyponatremia. Currently on Lantus 15 u [facility] will review sliding scale use from 3/1 and adjust Lantus and make consideration for nutritional insulin for more optimal glycemic control On review of AM labs noted to have Na 129, decrased from 134 on 3/1.  When corrected for serum glucose of 277 corrects to 133. Likely pseudohyponatremia. Currently on Lantus 15 u q will review sliding scale use from 3/1 and adjust Lantus and make consideration for nutritional insulin for more optimal glycemic control.    Addendum; patient used 16 units of pre-meal correction on 3/1. Will increase Lantus from 15u to 20u and discuss with primary attending service for further adjustments. On review of AM labs noted to have Na 129, decrased from 134 on 3/1.  When corrected for serum glucose of 277 corrects to 133. Likely pseudohyponatremia. Currently on Lantus 15 u qHS will review sliding scale use from 3/1 and adjust Lantus and make consideration for nutritional insulin for more optimal glycemic control.    Addendum; patient used 16 units of pre-meal correction on 3/1. Will increase Lantus from 15u to 20u and discuss with primary attending service for further adjustments. Endocrine consult called after discussion with Dr. Verduzco given A1C 10.5 and patient with STEMI.

## 2022-03-02 NOTE — PROGRESS NOTE ADULT - SUBJECTIVE AND OBJECTIVE BOX
Moise Prather NP  CCU Service  Cardiology   Spect: [phone] ([facility])     PATIENT: KOBI SOSA, MRN: [id number]    CHIEF COMPLAINT: Patient is a 56y old  Male who presents with a chief complaint of STEMI (01 Mar 2022 07:59)      INTERVAL HISTORY/OVERNIGHT EVENTS: No overnight events. Patient is complaining of irritation at penis site from condom cathter.  Denies abdominal pain, chest pain or SOB, cough. Has been ambulating without assistance. Oriented to person, place, and time. Breathing comfortably on room air.    REVIEW OF SYSTEMS:    Constitutional:     [ ] negative [ ] fevers [ ] chills [ ] weight loss [ ] weight gain  HEENT:                  [ ] negative [ ] dry eyes [ ] eye irritation [ ] postnasal drip [ ] nasal congestion  CV:                         [ ] negative  [ ] chest pain [ ] orthopnea [ ] palpitations [ ] murmur  Resp:                     [ ] negative [ ] cough [ ] shortness of breath [ ] dyspnea [ ] wheezing [ ] sputum [ ] hemoptysis  GI:                          [ ] negative [ ] nausea [ ] vomiting [ ] diarrhea [ ] constipation [ ] abd pain [ ] dysphagia   :                        [ ] negative [ ] dysuria [ ] nocturia [ ] hematuria [ ] increased urinary frequency  Musculoskeletal: [ ] negative [ ] back pain [ ] myalgias [ ] arthralgias [ ] fracture  Skin:                       [ ] negative [ ] rash [ ] itch  Neurological:        [ ] negative [ ] headache [ ] dizziness [ ] syncope [ ] weakness [ ] numbness  Psychiatric:           [ ] negative [ ] anxiety [ ] depression  Endocrine:            [ ] negative [ ] diabetes [ ] thyroid problem  Heme/Lymph:      [ ] negative [ ] anemia [x ] bleeding problem  Allergic/Immune: [ ] negative [ ] itchy eyes [ ] nasal discharge [ ] hives [ ] angioedema    [x ] All other systems negative  [ ] Unable to assess ROS because ________.    MEDICATIONS:  MEDICATIONS  (STANDING):  aspirin enteric coated 81 milliGRAM(s) Oral daily  atorvastatin 80 milliGRAM(s) Oral at bedtimecefepime   IVPB 1000 milliGRAM(s) IV Intermittent every 12 hours  folic acid 1 milliGRAM(s) Oral daily  heparin   Injectable 5000 Unit(s) SubCutaneous every 8 hours  insulin glargine Injectable (LANTUS) 15 Unit(s) SubCutaneous at bedtime  insulin lispro (ADMELOG) corrective regimen sliding scale   SubCutaneous three times a day before meals  insulin lispro (ADMELOG) corrective regimen sliding scale   SubCutaneous at bedtime  losartan 25 milliGRAM(s) Oral daily  metoprolol tartrate 25 milliGRAM(s) Oral two times a day  multivitamin 1 Tablet(s) Oral daily  ticagrelor 90 milliGRAM(s) Oral every 12 hours    MEDICATIONS  (PRN):  acetaminophen     Tablet .. 650 milliGRAM(s) Oral every 6 hours PRN Temp greater or equal to 38C (100.4F), Moderate Pain (4 - 6)      ALLERGIES: Allergies    No Known Allergies    Intolerances        OBJECTIVE:  ICU Vital Signs Last 24 Hrs  T(C): 36.8 (02 Mar 2022 05:00), Max: 37.6 (01 Mar 2022 11:18)  T(F): 98.3 (02 Mar 2022 05:00), Max: 99.6 (01 Mar 2022 11:18)  HR: 75 (02 Mar 2022 05:00) (73 - 104)  BP: 112/80 (02 Mar 2022 05:00) (101/70 - 137/91)  BP(mean): 87 (01 Mar 2022 23:00) (80 - 106)  RR: 18 (02 Mar 2022 05:00) (17 - 30)  SpO2: 94% (02 Mar 2022 05:00) (93% - 99%)      CAPILLARY BLOOD GLUCOSE      POCT Blood Glucose.: 250 mg/dL (01 Mar 2022 22:18)  POCT Blood Glucose.: 223 mg/dL (01 Mar 2022 16:11)  POCT Blood Glucose.: 284 mg/dL (01 Mar 2022 11:41)  POCT Blood Glucose.: 130 mg/dL (01 Mar 2022 11:20)  POCT Blood Glucose.: 311 mg/dL (01 Mar 2022 11:15)    CAPILLARY BLOOD GLUCOSE      POCT Blood Glucose.: 250 mg/dL (01 Mar 2022 22:18)    I&O's Summary    01 Mar 2022 07:01  -  02 Mar 2022 07:00  --------------------------------------------------------  IN: 0 mL / OUT: 900 mL / NET: -900 mL    02 Mar 2022 07:01  -  02 Mar 2022 08:32  --------------------------------------------------------  IN: 100 mL / OUT: 300 mL / NET: -200 mL      Daily     Daily Weight in k.1 (02 Mar 2022 05:00)    PHYSICAL EXAMINATION:  General: Comfortable, no acute distress, cooperative with exam.  HEENT: Moist mucous membranes.  Respiratory: CTAB, normal respiratory effort, no coughing, wheezes, crackles, or rales.  CV: RRR, S1S2, no murmurs, rubs or gallops. No JVD. Distal pulses intact.  Abdominal: Soft, nontender, nondistended, no rebound or guarding, normal bowel sounds.  Neurology: AOx3, no focal neuro defects, CALHOUN x 4.  Extremities: No pitting edema, + Peripheral pulses.  Incisions:   Tubes:    LABS:                          13.4   8.43  )-----------( 281      ( 02 Mar 2022 07:45 )             39.4     03-01    134<L>  |  99  |  17  ----------------------------<  213<H>  4.3   |  21<L>  |  1.10    Ca    9.0      01 Mar 2022 06:55  Phos  3.5     -  Mg     1.90     -    TPro  6.1  /  Alb  3.3  /  TBili  0.4  /  DBili  x   /  AST  27  /  ALT  35  /  AlkPhos  57  03-01    LIVER FUNCTIONS - ( 01 Mar 2022 06:55 )  Alb: 3.3 g/dL / Pro: 6.1 g/dL / ALK PHOS: 57 U/L / ALT: 35 U/L / AST: 27 U/L / GGT: x               TELEMETRY: NSR    EKG:     IMAGING:   TTE with Doppler (w/Cont) (22 @ 13:57)  CONCLUSIONS:  1. Endocardial visualization enhanced with intravenous  injection of echo contrast (Definity). Severe segmental  left ventricular systolic dysfunction. No LV thrombus.

## 2022-03-02 NOTE — PROGRESS NOTE ADULT - SUBJECTIVE AND OBJECTIVE BOX
[facility] Division of Hospital Medicine  Ronnie Verduzco MD  Pager (KIMMY-F, 1X-5P): [phone]  Other Times:  [phone]    Patient is a 56y old  Male who presents with a chief complaint of STEMI (02 Mar 2022 11:01)    SUBJECTIVE / OVERNIGHT EVENTS:  Patient offers no new complaints.  No F/C, N/V, CP, SOB, Cough, lightheadedness, dizziness, abdominal pain, diarrhea, dysuria.    MEDICATIONS  (STANDING):  aspirin enteric coated 81 milliGRAM(s) Oral daily  atorvastatin 80 milliGRAM(s) Oral at bedtime  cefepime   IVPB 1000 milliGRAM(s) IV Intermittent every 12 hours  chlorhexidine 4% Liquid 1 Application(s) Topical <User Schedule>  dextrose 40% Gel 15 Gram(s) Oral once  dextrose 5%. 1000 milliLiter(s) (50 mL/Hr) IV Continuous <Continuous>  dextrose 5%. 1000 milliLiter(s) (100 mL/Hr) IV Continuous <Continuous>  dextrose 50% Injectable 25 Gram(s) IV Push once  dextrose 50% Injectable 12.5 Gram(s) IV Push once  dextrose 50% Injectable 25 Gram(s) IV Push once  folic acid 1 milliGRAM(s) Oral daily  glucagon  Injectable 1 milliGRAM(s) IntraMuscular once  heparin   Injectable 5000 Unit(s) SubCutaneous every 8 hours  insulin glargine Injectable (LANTUS) 20 Unit(s) SubCutaneous at bedtime  insulin lispro (ADMELOG) corrective regimen sliding scale   SubCutaneous three times a day before meals  insulin lispro (ADMELOG) corrective regimen sliding scale   SubCutaneous at bedtime  insulin lispro Injectable (ADMELOG) 2 Unit(s) SubCutaneous three times a day before meals  metoprolol succinate ER 50 milliGRAM(s) Oral daily  multivitamin 1 Tablet(s) Oral daily  sacubitril 24 mG/valsartan 26 mG 1 Tablet(s) Oral two times a day  ticagrelor 90 milliGRAM(s) Oral every 12 hours    MEDICATIONS  (PRN):  acetaminophen     Tablet .. 650 milliGRAM(s) Oral every 6 hours PRN Temp greater or equal to 38C (100.4F), Moderate Pain (4 - 6)  aluminum hydroxide/magnesium hydroxide/simethicone Suspension 30 milliLiter(s) Oral every 4 hours PRN Dyspepsia      Vital Signs Last 24 Hrs  T(C): 36.8 (02 Mar 2022 12:00), Max: 37.2 (01 Mar 2022 20:00)  T(F): 98.3 (02 Mar 2022 12:00), Max: 99 (02 Mar 2022 00:30)  HR: 78 (02 Mar 2022 09:00) (73 - 104)  BP: 121/76 (02 Mar 2022 12:00) (104/80 - 135/92)  BP(mean): 87 (01 Mar 2022 23:00) (84 - 106)  RR: 18 (02 Mar 2022 12:00) (17 - 26)  SpO2: 99% (02 Mar 2022 12:00) (94% - 99%)  CAPILLARY BLOOD GLUCOSE      POCT Blood Glucose.: 266 mg/dL (02 Mar 2022 12:36)  POCT Blood Glucose.: 268 mg/dL (02 Mar 2022 08:56)  POCT Blood Glucose.: 250 mg/dL (01 Mar 2022 22:18)  POCT Blood Glucose.: 223 mg/dL (01 Mar 2022 16:11)    I&O's Summary    01 Mar 2022 07:01  -  02 Mar 2022 07:00  --------------------------------------------------------  IN: 0 mL / OUT: 900 mL / NET: -900 mL    02 Mar 2022 07:01  -  02 Mar 2022 14:18  --------------------------------------------------------  IN: 100 mL / OUT: 300 mL / NET: -200 mL        PHYSICAL EXAM:  CONSTITUTIONAL: NAD, well-developed, well-groomed  EYES: PERRLA; conjunctiva and sclera clear  ENMT: Moist oral mucosa, no pharyngeal injection or exudates; normal dentition  NECK: Supple, no palpable masses; no thyromegaly  RESPIRATORY: Normal respiratory effort; lungs are clear to auscultation bilaterally  CARDIOVASCULAR: Regular rate and rhythm, normal S1 and S2, no murmur/rub/gallop; No lower extremity edema; Peripheral pulses are 2+ bilaterally  ABDOMEN: Nontender to palpation, normoactive bowel sounds, no rebound/guarding; No hepatosplenomegaly  MUSCULOSKELETAL:  Normal gait; no clubbing or cyanosis of digits; no joint swelling or tenderness to palpation  PSYCH: A+O to person, place, and time; affect appropriate  NEUROLOGY: CN 2-12 are intact and symmetric; no gross sensory deficits   SKIN: No rashes; no palpable lesions    LABS:                        13.4   8.43  )-----------( 281      ( 02 Mar 2022 07:45 )             39.4     03-02    129<L>  |  97<L>  |  24<H>  ----------------------------<  277<H>  4.7   |  19<L>  |  1.28    Ca    9.4      02 Mar 2022 07:45  Phos  4.0     03-02  Mg     1.90     03-02    TPro  6.1  /  Alb  3.3  /  TBili  0.4  /  DBili  x   /  AST  27  /  ALT  35  /  AlkPhos  57  03-01              RADIOLOGY & ADDITIONAL TESTS:    Imaging Personally Reviewed:    Care Discussed with Consultants/Other Providers:

## 2022-03-02 NOTE — DISCHARGE NOTE PROVIDER - HOSPITAL COURSE
55M PMHx of alcohol use disorder w/ prior hospitalization for alcohol w/d, TIA (on aspirin), HTN, and uncontrolled IDDM presented with acute onset of typical angina and found to have an inferolateral STEMI s/p BECKY to pCX  100% occlusion. An IABP was placed for perfusion and pt admitted to CCU for further management. IABP Dc'd 2/28. Pt's course was c/b fevers of unknown origin. Pt placed on cefepime, azithromycin, and vancomycin empirically. Blood cultures negative, UA, urine legionella negative, MRSA negative. CT scan negative for collections. azithromycin, and vancomycin discontinued and empiric course of cefepime continued. Pt remains with low grade temps. WBC normalized. Pt on GDMT and ready for transfer to Medicine. While on medicine service cefepime was completed and insulin regimen was adjusted for improved glycemic control.     On .... case was discussed with Dr. Romero.. and patient was cleraed for discharge. 55M PMHx of alcohol use disorder w/ prior hospitalization for alcohol w/d, TIA (on aspirin), HTN, and uncontrolled IDDM presented with acute onset of typical angina and found to have an inferolateral STEMI s/p BECKY to pCX  100% occlusion, pLAD 30%, dOM 100% EF 30-35%, LVEDP 36 s/p lasix 40mg IVP x1. An IABP was placed for perfusion and pt admitted to CCU for further management. IABP Dc'd 2/28. Pt's course was c/b fevers of unknown origin. Pt placed on cefepime, azithromycin, and vancomycin empirically. Blood cultures negative, UA, urine legionella negative, MRSA negative. CT scan negative for collections. Abx completed 3/2. Pt remains with low grade temps. WBC normalized. Pt on GDMT and ready for transfer to Medicine. While on medicine service cefepime was completed and insulin regimen was adjusted for improved glycemic control.     Brilinta and Entresto both covered at [facility] - Brilinta is $1 and entresto $3    On 3/3 case was discussed with Dr. Verduzco and patient was cleraed for discharge.

## 2022-03-02 NOTE — DISCHARGE NOTE PROVIDER - NSFOLLOWUPCLINICS_GEN_ALL_ED_FT
[facility]  Wound Care  [address] [place] [address]  Phone: [phone]  Fax: [phone]

## 2022-03-02 NOTE — PROGRESS NOTE ADULT - ASSESSMENT
55M EtOH abuse, HTN, DM type 2, p/w STEMI s/p BECKY to LCx on 2/25 with IABP - monitored in CCU, s/p removal on 2/28 c/b multilobar PNA.

## 2022-03-02 NOTE — DISCHARGE NOTE PROVIDER - NSDCFUADDAPPT_GEN_ALL_CORE_FT
Please have skin care follow up for irritation at penis site from condom cathter  Please follow up with Cardiologist.  Please follow up with Endocrinologist.

## 2022-03-02 NOTE — DISCHARGE NOTE PROVIDER - CARE PROVIDERS DIRECT ADDRESSES
,DirectAddress_Unknown,[email or web address]

## 2022-03-02 NOTE — PROGRESS NOTE ADULT - ASSESSMENT
55M PMHx of alcohol use disorder w/ prior hospitalization for alcohol w/d, TIA (on aspirin), HTN, and uncontrolled IDDM presented with acute onset of typical angina and found to have an inferolateral STEMI w/ 100% occlusion both proximal LCxs and distal OM s/p DESx1. Admitted to CCU for further management.       Inferolateral wall STEMI  - (+trops, +EKG changes); s/p IABP  - S/p DESx1 stent to proximal LCx  - TTE indicates severe anterolateral hypokinesis, severe apical hypokinesis, and diaphragmatic akinesis. EF 35%  - Troponin peaked to >10,000 but downtrending to ~5500  - DC'ed heparin on 2/28/22 and now s/p IABP  - DAPT: ASA 81 & Ticagrelor 90mg BID for at least one year  - Continue Losartan 25mg QD, likely change to Entresto   - Continue atorvastatin 80mg QD  - Change metoprolol tartrate 25mg BID to Toprol xl 50mg daily   - Repeated ECHO severe segmental left ventricular systolic dysfunction. No LV thrombus.    PULM:  - WBC elevated initially; endorsed dry cough prior to admission and subjective fevers as well; shivering upon CCU admission  - CXR w/ L upper lobe opacity, ?pneumonia  - WBC continues to downtrend; low-grade fever overnight  - Continue on cefepime; DC'ed vanc 3/1/22 (MRSA negative)   - non-con CT w/ pleural effusions; Lasix 40mg IV x1 3/1/22    RENAL:  #HOA likely prerenal i.s.o recent MI vs infection vs medications  - Resolved  - SCr 1.52 with unknown baseline during admission; now downtrended to 1.1  - euvolemic on exam; strict I&O's; monitor urine output     Skin:  - Please have skin care follow up for irritation at penis site from condom cathter      ID:  #SIRS+ w. leukocytosis (~15) and fever 101.6F i.s.o inferolateral STEMI  - downtrending WBC 6.96 from 7.58 likely MI and/or possible infection  - CXR w/ possible L upper lobe opacity, ?PNA  - UA unremarkable; Bcx NGTD  - DC'ed vancomycin (2/25-3/1), Zosyn (2/25-2/26), and azithromycin (2/26-2/27)    - Continue cefepime (2/26-3/2)  - CT chest Bilateral upper lobe predominant ground glass opacities with small bilateral pleural effusions, which is consistent with pulmonary venous hypertension.  - + staph Aureus PCR

## 2022-03-03 ENCOUNTER — TRANSCRIPTION ENCOUNTER (OUTPATIENT)
Age: 57
End: 2022-03-03

## 2022-03-03 VITALS
SYSTOLIC BLOOD PRESSURE: 101 MMHG | TEMPERATURE: 98 F | HEART RATE: 79 BPM | OXYGEN SATURATION: 99 % | DIASTOLIC BLOOD PRESSURE: 66 MMHG | RESPIRATION RATE: 18 BRPM

## 2022-03-03 LAB
ANION GAP SERPL CALC-SCNC: 10 MMOL/L — SIGNIFICANT CHANGE UP (ref 7–14)
BUN SERPL-MCNC: 26 MG/DL — HIGH (ref 7–23)
CALCIUM SERPL-MCNC: 9.2 MG/DL — SIGNIFICANT CHANGE UP (ref 8.4–10.5)
CHLORIDE SERPL-SCNC: 99 MMOL/L — SIGNIFICANT CHANGE UP (ref 98–107)
CO2 SERPL-SCNC: 21 MMOL/L — LOW (ref 22–31)
CREAT SERPL-MCNC: 1.25 MG/DL — SIGNIFICANT CHANGE UP (ref 0.5–1.3)
EGFR: 68 ML/MIN/1.73M2 — SIGNIFICANT CHANGE UP
GLUCOSE SERPL-MCNC: 172 MG/DL — HIGH (ref 70–99)
HCT VFR BLD CALC: 38.4 % — LOW (ref 39–50)
HGB BLD-MCNC: 13.5 G/DL — SIGNIFICANT CHANGE UP (ref 13–17)
MAGNESIUM SERPL-MCNC: 2 MG/DL — SIGNIFICANT CHANGE UP (ref 1.6–2.6)
MCHC RBC-ENTMCNC: 30.1 PG — SIGNIFICANT CHANGE UP (ref 27–34)
MCHC RBC-ENTMCNC: 35.2 GM/DL — SIGNIFICANT CHANGE UP (ref 32–36)
MCV RBC AUTO: 85.7 FL — SIGNIFICANT CHANGE UP (ref 80–100)
NRBC # BLD: 0 /100 WBCS — SIGNIFICANT CHANGE UP
NRBC # FLD: 0 K/UL — SIGNIFICANT CHANGE UP
PHOSPHATE SERPL-MCNC: 4 MG/DL — SIGNIFICANT CHANGE UP (ref 2.5–4.5)
PLATELET # BLD AUTO: 300 K/UL — SIGNIFICANT CHANGE UP (ref 150–400)
POTASSIUM SERPL-MCNC: 4.7 MMOL/L — SIGNIFICANT CHANGE UP (ref 3.5–5.3)
POTASSIUM SERPL-SCNC: 4.7 MMOL/L — SIGNIFICANT CHANGE UP (ref 3.5–5.3)
RBC # BLD: 4.48 M/UL — SIGNIFICANT CHANGE UP (ref 4.2–5.8)
RBC # FLD: 12.7 % — SIGNIFICANT CHANGE UP (ref 10.3–14.5)
SODIUM SERPL-SCNC: 130 MMOL/L — LOW (ref 135–145)
WBC # BLD: 8.56 K/UL — SIGNIFICANT CHANGE UP (ref 3.8–10.5)
WBC # FLD AUTO: 8.56 K/UL — SIGNIFICANT CHANGE UP (ref 3.8–10.5)

## 2022-03-03 PROCEDURE — 99239 HOSP IP/OBS DSCHRG MGMT >30: CPT

## 2022-03-03 PROCEDURE — 99233 SBSQ HOSP IP/OBS HIGH 50: CPT

## 2022-03-03 RX ORDER — FOLIC ACID 0.8 MG
1 TABLET ORAL
Qty: 0 | Refills: 0 | DISCHARGE
Start: 2022-03-03

## 2022-03-03 RX ORDER — LANOLIN ALCOHOL/MO/W.PET/CERES
3 CREAM (GRAM) TOPICAL ONCE
Refills: 0 | Status: DISCONTINUED | OUTPATIENT
Start: 2022-03-03 | End: 2022-03-03

## 2022-03-03 RX ORDER — INSULIN NPH HUM/REG INSULIN HM 70-30/ML
30 VIAL (ML) SUBCUTANEOUS
Qty: 0 | Refills: 0 | DISCHARGE

## 2022-03-03 RX ORDER — LOSARTAN POTASSIUM 100 MG/1
1 TABLET, FILM COATED ORAL
Qty: 0 | Refills: 0 | DISCHARGE

## 2022-03-03 RX ORDER — METOPROLOL TARTRATE 50 MG
1 TABLET ORAL
Qty: 30 | Refills: 0
Start: 2022-03-03 | End: 2022-04-01

## 2022-03-03 RX ORDER — ATORVASTATIN CALCIUM 80 MG/1
1 TABLET, FILM COATED ORAL
Qty: 30 | Refills: 0
Start: 2022-03-03 | End: 2022-04-01

## 2022-03-03 RX ORDER — AMLODIPINE BESYLATE 2.5 MG/1
1 TABLET ORAL
Qty: 0 | Refills: 0 | DISCHARGE

## 2022-03-03 RX ADMIN — Medication 4 UNIT(S): at 13:20

## 2022-03-03 RX ADMIN — HEPARIN SODIUM 5000 UNIT(S): 5000 INJECTION INTRAVENOUS; SUBCUTANEOUS at 05:31

## 2022-03-03 RX ADMIN — Medication 2: at 09:48

## 2022-03-03 RX ADMIN — Medication 1 TABLET(S): at 13:21

## 2022-03-03 RX ADMIN — Medication 6: at 13:19

## 2022-03-03 RX ADMIN — TICAGRELOR 90 MILLIGRAM(S): 90 TABLET ORAL at 05:31

## 2022-03-03 RX ADMIN — Medication 1 MILLIGRAM(S): at 13:22

## 2022-03-03 RX ADMIN — Medication 50 MILLIGRAM(S): at 05:37

## 2022-03-03 RX ADMIN — Medication 81 MILLIGRAM(S): at 13:21

## 2022-03-03 RX ADMIN — SACUBITRIL AND VALSARTAN 1 TABLET(S): 24; 26 TABLET, FILM COATED ORAL at 05:30

## 2022-03-03 RX ADMIN — CHLORHEXIDINE GLUCONATE 1 APPLICATION(S): 213 SOLUTION TOPICAL at 06:10

## 2022-03-03 RX ADMIN — Medication 4 UNIT(S): at 09:49

## 2022-03-03 NOTE — DISCHARGE NOTE NURSING/CASE MANAGEMENT/SOCIAL WORK - PATIENT PORTAL LINK FT
You can access the FollowMyHealth Patient Portal offered by [facility] by registering at the following website: [email or web address]. By joining [facility]’s FollowMyHealth portal, you will also be able to view your health information using other applications (apps) compatible with our system.

## 2022-03-03 NOTE — PROGRESS NOTE ADULT - ASSESSMENT
55M with ETOH abuse and previous withdrawal, TIA, HTN, and uncontrolled IDDM presents with chest pain and + ILWSTEMI, s/p BECKY to pLCX/dOM requiring initial mechanical support with IABP. Course also c/b fevers.       #ILWSTEMI  - s/p BECKY to LCX/OM  - continue DAPT, lipitor, Toprol XL, Entresto  - ECHO with severe segmental LV dysfunction    PULM:  - no SOB  - ?PNA on CXR with fevers, completed cefepime course on 3/2    RENAL:  #HOA  - resolved    Skin:  - Please have skin care follow up for irritation at penis site from condom cathter    ID:  - no fevers, no leukocytosis  - completed cefepime course 3/2    DISPO:  - OOB as tolerated  - D/C planning from cardiac perspective

## 2022-03-03 NOTE — PROGRESS NOTE ADULT - PROBLEM SELECTOR PLAN 1
s/p EDS to LCx s/p IABP support  - Continue ASA, Brilinta, lipitor.  - Hep SC for VTE ppx  - tele monitor
s/p EDS to LCx s/p IABP support  - Continue ASA, Brilinta, lipitor.  - Hep SC for VTE ppx  - monitor for next 24 hours  - tele monitor

## 2022-03-03 NOTE — PROGRESS NOTE ADULT - PROBLEM SELECTOR PLAN 5
NO sign of EtOH w/d signs.  Advised Alcohol cessation.
NO sign of EtOH w/d signs.  Advised Alcohol cessation.

## 2022-03-03 NOTE — PROGRESS NOTE ADULT - PROBLEM SELECTOR PLAN 3
Patient was on 70/30 @ 30u in AM and 15u PM at home. Anticipate patient returning to this regimen on discharge.  Plan for outpt Endocrine appointment on 3/27.  Titrate lantus and admelog while inpatient for optimal DM control - Goal -180
Patient was on 70/30 @ 30u in AM and 15u PM at home. Anticipate patient returning to this regimen on discharge.  Plan for outpt Endocrine appointment on 3/27.  Titrate lantus and admelog while inpatient for optimal DM control - Goal -180

## 2022-03-03 NOTE — DISCHARGE NOTE NURSING/CASE MANAGEMENT/SOCIAL WORK - NSDCPEFALRISK_GEN_ALL_CORE
For information on Fall & Injury Prevention, visit: [email or web address] OR  [email or web address] OR  https://www.cdc.gov/steadi/patient.html

## 2022-03-03 NOTE — PROGRESS NOTE ADULT - SUBJECTIVE AND OBJECTIVE BOX
[facility] Division of Hospital Medicine  Ronnie Verduzco MD  Pager (KIMMY-F, 2R-5P): [phone]  Other Times:  [phone]    Patient is a 56y old  Male who presents with a chief complaint of STEMI (03 Mar 2022 08:42)    SUBJECTIVE / OVERNIGHT EVENTS:  Offers no new complaints.  No F/C, N/V, CP, SOB, Cough, lightheadedness, dizziness, abdominal pain, diarrhea, dysuria.    MEDICATIONS  (STANDING):  aspirin enteric coated 81 milliGRAM(s) Oral daily  atorvastatin 80 milliGRAM(s) Oral at bedtime  chlorhexidine 4% Liquid 1 Application(s) Topical <User Schedule>  dextrose 40% Gel 15 Gram(s) Oral once  dextrose 5%. 1000 milliLiter(s) (50 mL/Hr) IV Continuous <Continuous>  dextrose 5%. 1000 milliLiter(s) (100 mL/Hr) IV Continuous <Continuous>  dextrose 50% Injectable 25 Gram(s) IV Push once  dextrose 50% Injectable 12.5 Gram(s) IV Push once  dextrose 50% Injectable 25 Gram(s) IV Push once  folic acid 1 milliGRAM(s) Oral daily  glucagon  Injectable 1 milliGRAM(s) IntraMuscular once  heparin   Injectable 5000 Unit(s) SubCutaneous every 8 hours  insulin glargine Injectable (LANTUS) 20 Unit(s) SubCutaneous at bedtime  insulin lispro (ADMELOG) corrective regimen sliding scale   SubCutaneous three times a day before meals  insulin lispro (ADMELOG) corrective regimen sliding scale   SubCutaneous at bedtime  insulin lispro Injectable (ADMELOG) 4 Unit(s) SubCutaneous three times a day before meals  melatonin 3 milliGRAM(s) Oral once  metoprolol succinate ER 50 milliGRAM(s) Oral daily  multivitamin 1 Tablet(s) Oral daily  sacubitril 24 mG/valsartan 26 mG 1 Tablet(s) Oral two times a day  ticagrelor 90 milliGRAM(s) Oral every 12 hours    MEDICATIONS  (PRN):  acetaminophen     Tablet .. 650 milliGRAM(s) Oral every 6 hours PRN Temp greater or equal to 38C (100.4F), Moderate Pain (4 - 6)  aluminum hydroxide/magnesium hydroxide/simethicone Suspension 30 milliLiter(s) Oral every 4 hours PRN Dyspepsia      Vital Signs Last 24 Hrs  T(C): 36.8 (03 Mar 2022 05:00), Max: 36.8 (03 Mar 2022 05:00)  T(F): 98.2 (03 Mar 2022 05:00), Max: 98.2 (03 Mar 2022 05:00)  HR: 70 (03 Mar 2022 05:00) (70 - 77)  BP: 113/72 (03 Mar 2022 05:00) (112/75 - 113/72)  BP(mean): --  RR: 18 (03 Mar 2022 05:00) (18 - 18)  SpO2: 100% (03 Mar 2022 05:00) (100% - 100%)  CAPILLARY BLOOD GLUCOSE      POCT Blood Glucose.: 163 mg/dL (03 Mar 2022 08:52)  POCT Blood Glucose.: 218 mg/dL (02 Mar 2022 21:03)  POCT Blood Glucose.: 221 mg/dL (02 Mar 2022 17:47)  POCT Blood Glucose.: 266 mg/dL (02 Mar 2022 12:36)    I&O's Summary    02 Mar 2022 07:01  -  03 Mar 2022 07:00  --------------------------------------------------------  IN: 200 mL / OUT: 600 mL / NET: -400 mL        PHYSICAL EXAM:  CONSTITUTIONAL: NAD, well-developed, well-groomed  EYES: PERRLA; conjunctiva and sclera clear  ENMT: Moist oral mucosa, no pharyngeal injection or exudates; normal dentition  NECK: Supple, no palpable masses; no thyromegaly  RESPIRATORY: Normal respiratory effort; lungs are clear to auscultation bilaterally  CARDIOVASCULAR: Regular rate and rhythm, normal S1 and S2, no murmur/rub/gallop; No lower extremity edema; Peripheral pulses are 2+ bilaterally  ABDOMEN: Nontender to palpation, normoactive bowel sounds, no rebound/guarding; No hepatosplenomegaly  MUSCULOSKELETAL:  Normal gait; no clubbing or cyanosis of digits; no joint swelling or tenderness to palpation  PSYCH: A+O to person, place, and time; affect appropriate  NEUROLOGY: CN 2-12 are intact and symmetric; no gross sensory deficits   SKIN: No rashes; no palpable lesions  LABS:                        13.5   8.56  )-----------( 300      ( 03 Mar 2022 07:44 )             38.4     03-03    130<L>  |  99  |  26<H>  ----------------------------<  172<H>  4.7   |  21<L>  |  1.25    Ca    9.2      03 Mar 2022 07:44  Phos  4.0     03-03  Mg     2.00     03-03                RADIOLOGY & ADDITIONAL TESTS:    Imaging Personally Reviewed:    Care Discussed with Consultants/Other Providers:

## 2022-03-03 NOTE — DISCHARGE NOTE NURSING/CASE MANAGEMENT/SOCIAL WORK - NSDCVIVACCINE_GEN_ALL_CORE_FT
influenza, injectable, quadrivalent, preservative free; 08-Oct-2020 14:55; Nina Webb (CLAIRE); [employer]; 5P499 (Exp. Date: 30-Jun-2021); IntraMuscular; Deltoid Right.; 0.5 milliLiter(s); VIS (VIS Published: 15-Aug-2019, VIS Presented: 08-Oct-2020);

## 2022-03-03 NOTE — PROGRESS NOTE ADULT - ATTENDING COMMENTS
55 year old man with history of ETOH abuse, fever, chills, and chest pain with late presentation inferolateral MI.  [facility] with PCI x LCX. LVEDP 36 with lasix 40 mg IV x 1  Was diaphoretic overweekend received 1 mg IV Ativan for CIWA  IABP removed 2/28/22    TTE 2/25/22:  CONCLUSIONS:  1. Endocardial visualization enhanced with intravenous  injection of echo contrast (Definity). Severe segmental  left ventricular systolic dysfunction with hypokinesis of  the inferior and lateral walls.  2. Mild diastolic dysfunction (Stage I).  3. Normal right ventricular size and function.  4. Normal tricuspid valve. Mild tricuspid regurgitation.  5. Estimated pulmonary artery systolic pressure equals 39  mm Hg, assuming right atrial pressure equals 10  mm Hg,  consistent with borderline pulmonary hypertension.  *** No previous Echo exam.  ------------------------------------------------------------------------    Meds:  ASA  Brilinta  Atorvastatin  Cefepime  Heparin SQ  Metoprolol 25 BID  Losartan 25 mg Daily    #Neuro/Psych-Known ETOH use  Monitor for signs of withdrawl; Ativan as needed  #CV-Inferolateral STEMI  -Continue ASA/Brilinta/Statin  -Continue GDMT with Metoprolol and Losartan (switch to Entresto 24-26 mg BID)  #ID- Now afebrile  Continue ABx-changed to Cefepime  Can dc after today's dose  #DC planning
55 year old man with history of ETOH abuse, fever, chills, and chest pain with late presentation inferolateral MI.  [facility] with PCI x LCX. LVEDP 36 with lasix 40 mg IV x 1  Was diaphoretic overweekend received 1 mg IV Ativan for CIWA  Tm 101.8 overnight    TTE 2/25/22:  CONCLUSIONS:  1. Endocardial visualization enhanced with intravenous  injection of echo contrast (Definity). Severe segmental  left ventricular systolic dysfunction with hypokinesis of  the inferior and lateral walls.  2. Mild diastolic dysfunction (Stage I).  3. Normal right ventricular size and function.  4. Normal tricuspid valve. Mild tricuspid regurgitation.  5. Estimated pulmonary artery systolic pressure equals 39  mm Hg, assuming right atrial pressure equals 10  mm Hg,  consistent with borderline pulmonary hypertension.  *** No previous Echo exam.  ------------------------------------------------------------------------    Meds:  ASA  Brilinta  Atorvastatin  Vanco  Cefepime    #Neuro/Psych-Known ETOH use  Monitor for signs of withdrawl; Ativan as needed  #CV-Inferolateral STEMI  -Continue ASA/Brilinta/Statin  -IABP in place-dc today  - Losartan 12.5 mg daily  #ID- Febrile with WBC and LLung opacity  Continue ABx-changed to Cefepime  If remains febrile after IABP, then ID consult
55 year old man with history of ETOH abuse, fever, chills, and chest pain with late presentation inferolateral MI.  [facility] with PCI x LCX. LVEDP 36 with lasix 40 mg IV x 1  Was diaphoretic overweekend received 1 mg IV Ativan for CIWA  IABP removed 2/28/22    TTE 2/25/22:  CONCLUSIONS:  1. Endocardial visualization enhanced with intravenous  injection of echo contrast (Definity). Severe segmental  left ventricular systolic dysfunction with hypokinesis of  the inferior and lateral walls.  2. Mild diastolic dysfunction (Stage I).  3. Normal right ventricular size and function.  4. Normal tricuspid valve. Mild tricuspid regurgitation.  5. Estimated pulmonary artery systolic pressure equals 39  mm Hg, assuming right atrial pressure equals 10  mm Hg,  consistent with borderline pulmonary hypertension.  *** No previous Echo exam.  ------------------------------------------------------------------------    Meds:  ASA  Brilinta  Atorvastatin  Cefepime  Heparin SQ  Toprol 50 mg daily  Entresto      #Neuro/Psych-Known ETOH use  Monitor for signs of withdrawl; Ativan as needed  #CV-Inferolateral STEMI  -Continue ASA/Brilinta/Statin  -Continue GDMT with Metoprolol and entresto  #ID- Now afebrile  Off abx  #DC planning
55 year old man with history of ETOH abuse, fever, chills, and chest pain with late presentation inferolateral MI.  [facility] with PCI x LCX. LVEDP 36 with lasix 40 mg IV x 1  Was diaphoretic overweekend received 1 mg IV Ativan for CIWA  Tm 100.8 overnight  IABP removed 2/28/22    TTE 2/25/22:  CONCLUSIONS:  1. Endocardial visualization enhanced with intravenous  injection of echo contrast (Definity). Severe segmental  left ventricular systolic dysfunction with hypokinesis of  the inferior and lateral walls.  2. Mild diastolic dysfunction (Stage I).  3. Normal right ventricular size and function.  4. Normal tricuspid valve. Mild tricuspid regurgitation.  5. Estimated pulmonary artery systolic pressure equals 39  mm Hg, assuming right atrial pressure equals 10  mm Hg,  consistent with borderline pulmonary hypertension.  *** No previous Echo exam.  ------------------------------------------------------------------------    Meds:  ASA  Brilinta  Atorvastatin  Vanco  Cefepime  Heparin SQ  Metoprolol 25 BID  Losartan 25 mg Daily    #Neuro/Psych-Known ETOH use  Monitor for signs of withdrawl; Ativan as needed  #CV-Inferolateral STEMI  -Continue ASA/Brilinta/Statin  -IABP dc'd- no issues  -Continue GDMT with Metoprolol and Losartan  #ID- Febrile with WBC and LLung opacity  Continue ABx-changed to Cefepime  If remains febrile after IABP, then ID consult
55 year old man with history of ETOH abuse, fever, chills, and chest pain with late presentation inferolateral MI.  [facility] with PCI x LCX. LVEDP 36 with lasix 40 mg IV x 1  Was diaphoretic overnight received 1 mg IV Ativan for CIWA  Tm 99.9 overnight    TTE 2/25/22:  CONCLUSIONS:  1. Endocardial visualization enhanced with intravenous  injection of echo contrast (Definity). Severe segmental  left ventricular systolic dysfunction with hypokinesis of  the inferior and lateral walls.  2. Mild diastolic dysfunction (Stage I).  3. Normal right ventricular size and function.  4. Normal tricuspid valve. Mild tricuspid regurgitation.  5. Estimated pulmonary artery systolic pressure equals 39  mm Hg, assuming right atrial pressure equals 10  mm Hg,  consistent with borderline pulmonary hypertension.  *** No previous Echo exam.  ------------------------------------------------------------------------    Meds:  ASA  Brilinta  Heparin gtt  Atorvastatin  Vanco  Cefepime  Azithromycin    #Neuro/Psych-Known ETOH use  Monitor for signs of withdrawl; Ativan as needed  #CV-Inferolateral STEMI  -Continue ASA/Brilinta/Statin  -IABP in place-will dc tomorrow  -Can start Losartan 12.5 if BP allows  #ID- Febrile with WBC and LLung opacity  Continue ABx-changed to Cefepime; added Azithro but legionella negative-can dc
55 year old man with history of ETOH abuse, fever, chills, and chest pain with late presentation inferolateral MI.  [facility] with PCI x LCX. LVEDP 36 with lasix 40 mg IV x 1  Remained febrile overnight~Tm 102.2; despite Abx    TTE 2/25/22:  CONCLUSIONS:  1. Endocardial visualization enhanced with intravenous  injection of echo contrast (Definity). Severe segmental  left ventricular systolic dysfunction with hypokinesis of  the inferior and lateral walls.  2. Mild diastolic dysfunction (Stage I).  3. Normal right ventricular size and function.  4. Normal tricuspid valve. Mild tricuspid regurgitation.  5. Estimated pulmonary artery systolic pressure equals 39  mm Hg, assuming right atrial pressure equals 10  mm Hg,  consistent with borderline pulmonary hypertension.  *** No previous Echo exam.  ------------------------------------------------------------------------    Meds:  ASA  Brilinta  Heparin gtt  Atorvastatin  Vanco  Zosyn    #Neuro/Psych-Known ETOH use  Monitor for signs of withdrawl  #CV-Inferolateral STEMI  -Continue ASA/Brilinta/Statin  -IABP in place  #ID- Febrile with WBC and LLung opacity  Repeat RVP  Continue ABx-change Zosyn to Cefepime; add Azithro

## 2022-03-03 NOTE — PROGRESS NOTE ADULT - SUBJECTIVE AND OBJECTIVE BOX
Subjective/Objective: Resting in bed, anxious to go home. No fever, no leukocytosis    MEDICATIONS  (STANDING):  aspirin enteric coated 81 milliGRAM(s) Oral daily  atorvastatin 80 milliGRAM(s) Oral at bedtime  chlorhexidine 4% Liquid 1 Application(s) Topical <User Schedule>  dextrose 40% Gel 15 Gram(s) Oral once  dextrose 5%. 1000 milliLiter(s) (50 mL/Hr) IV Continuous <Continuous>  dextrose 5%. 1000 milliLiter(s) (100 mL/Hr) IV Continuous <Continuous>  dextrose 50% Injectable 25 Gram(s) IV Push once  dextrose 50% Injectable 12.5 Gram(s) IV Push once  dextrose 50% Injectable 25 Gram(s) IV Push once  folic acid 1 milliGRAM(s) Oral daily  glucagon  Injectable 1 milliGRAM(s) IntraMuscular once  heparin   Injectable 5000 Unit(s) SubCutaneous every 8 hours  insulin glargine Injectable (LANTUS) 20 Unit(s) SubCutaneous at bedtime  insulin lispro (ADMELOG) corrective regimen sliding scale   SubCutaneous three times a day before meals  insulin lispro (ADMELOG) corrective regimen sliding scale   SubCutaneous at bedtime  insulin lispro Injectable (ADMELOG) 4 Unit(s) SubCutaneous three times a day before meals  melatonin 3 milliGRAM(s) Oral once  metoprolol succinate ER 50 milliGRAM(s) Oral daily  multivitamin 1 Tablet(s) Oral daily  sacubitril 24 mG/valsartan 26 mG 1 Tablet(s) Oral two times a day  ticagrelor 90 milliGRAM(s) Oral every 12 hours    MEDICATIONS  (PRN):  acetaminophen     Tablet .. 650 milliGRAM(s) Oral every 6 hours PRN Temp greater or equal to 38C (100.4F), Moderate Pain (4 - 6)  aluminum hydroxide/magnesium hydroxide/simethicone Suspension 30 milliLiter(s) Oral every 4 hours PRN Dyspepsia          Vital Signs Last 24 Hrs  T(C): 36.8 (03 Mar 2022 05:00), Max: 37.1 (02 Mar 2022 09:00)  T(F): 98.2 (03 Mar 2022 05:00), Max: 98.7 (02 Mar 2022 09:00)  HR: 70 (03 Mar 2022 05:00) (70 - 78)  BP: 113/72 (03 Mar 2022 05:00) (112/75 - 121/76)  BP(mean): --  RR: 18 (03 Mar 2022 05:00) (17 - 18)  SpO2: 100% (03 Mar 2022 05:00) (99% - 100%)  I&O's Detail    02 Mar 2022 07:01  -  03 Mar 2022 07:00  --------------------------------------------------------  IN:    Oral Fluid: 200 mL  Total IN: 200 mL    OUT:    Voided (mL): 600 mL  Total OUT: 600 mL    Total NET: -400 mL            PHYSICAL EXAM  GEN:  RESP:  CV:  GI:  EXT:  NEURO:  PSYCH:        EKG/ TELEM:    LABS:                          13.5   8.56  )-----------( 300      ( 03 Mar 2022 07:44 )             38.4       02 Mar 2022 07:45    129<L>  |  97<L>  |  24<H>  ----------------------------<  277<H>  4.7     |  19<L>  |  1.28     Ca    9.4        02 Mar 2022 07:45  Phos  4.0       02 Mar 2022 07:45  Mg     1.90      02 Mar 2022 07:45                        Diagnostic testing:        Assessment and Plan:         Subjective/Objective: Resting in bed, anxious to go home. No fever, no leukocytosis    MEDICATIONS  (STANDING):  aspirin enteric coated 81 milliGRAM(s) Oral daily  atorvastatin 80 milliGRAM(s) Oral at bedtime  chlorhexidine 4% Liquid 1 Application(s) Topical <User Schedule>  dextrose 40% Gel 15 Gram(s) Oral once  dextrose 5%. 1000 milliLiter(s) (50 mL/Hr) IV Continuous <Continuous>  dextrose 5%. 1000 milliLiter(s) (100 mL/Hr) IV Continuous <Continuous>  dextrose 50% Injectable 25 Gram(s) IV Push once  dextrose 50% Injectable 12.5 Gram(s) IV Push once  dextrose 50% Injectable 25 Gram(s) IV Push once  folic acid 1 milliGRAM(s) Oral daily  glucagon  Injectable 1 milliGRAM(s) IntraMuscular once  heparin   Injectable 5000 Unit(s) SubCutaneous every 8 hours  insulin glargine Injectable (LANTUS) 20 Unit(s) SubCutaneous at bedtime  insulin lispro (ADMELOG) corrective regimen sliding scale   SubCutaneous three times a day before meals  insulin lispro (ADMELOG) corrective regimen sliding scale   SubCutaneous at bedtime  insulin lispro Injectable (ADMELOG) 4 Unit(s) SubCutaneous three times a day before meals  melatonin 3 milliGRAM(s) Oral once  metoprolol succinate ER 50 milliGRAM(s) Oral daily  multivitamin 1 Tablet(s) Oral daily  sacubitril 24 mG/valsartan 26 mG 1 Tablet(s) Oral two times a day  ticagrelor 90 milliGRAM(s) Oral every 12 hours    MEDICATIONS  (PRN):  acetaminophen     Tablet .. 650 milliGRAM(s) Oral every 6 hours PRN Temp greater or equal to 38C (100.4F), Moderate Pain (4 - 6)  aluminum hydroxide/magnesium hydroxide/simethicone Suspension 30 milliLiter(s) Oral every 4 hours PRN Dyspepsia          Vital Signs Last 24 Hrs  T(C): 36.8 (03 Mar 2022 05:00), Max: 37.1 (02 Mar 2022 09:00)  T(F): 98.2 (03 Mar 2022 05:00), Max: 98.7 (02 Mar 2022 09:00)  HR: 70 (03 Mar 2022 05:00) (70 - 78)  BP: 113/72 (03 Mar 2022 05:00) (112/75 - 121/76)  BP(mean): --  RR: 18 (03 Mar 2022 05:00) (17 - 18)  SpO2: 100% (03 Mar 2022 05:00) (99% - 100%)  I&O's Detail    02 Mar 2022 07:01  -  03 Mar 2022 07:00  --------------------------------------------------------  IN:    Oral Fluid: 200 mL  Total IN: 200 mL    OUT:    Voided (mL): 600 mL  Total OUT: 600 mL    Total NET: -400 mL      PHYSICAL EXAM  GEN: NAD, skin W & D  RESP: CTA B/L  CV: nl S1S2  GI: soft, NT/ND  EXT: no C/C/E  NEURO: A & O X 3    EKG/ TELEM: NSR    LABS:                          13.5   8.56  )-----------( 300      ( 03 Mar 2022 07:44 )             38.4       02 Mar 2022 07:45    129<L>  |  97<L>  |  24<H>  ----------------------------<  277<H>  4.7     |  19<L>  |  1.28       Ca    9.4        02 Mar 2022 07:45  Phos  4.0       02 Mar 2022 07:45  Mg     1.90      02 Mar 2022 07:45

## 2022-03-04 PROBLEM — G45.9 TRANSIENT CEREBRAL ISCHEMIC ATTACK, UNSPECIFIED: Chronic | Status: ACTIVE | Noted: 2022-02-25

## 2022-03-04 PROBLEM — Z00.00 ENCOUNTER FOR PREVENTIVE HEALTH EXAMINATION: Status: ACTIVE | Noted: 2022-03-04

## 2022-03-11 DIAGNOSIS — Z78.9 OTHER SPECIFIED HEALTH STATUS: ICD-10-CM

## 2022-03-11 DIAGNOSIS — F10.21 ALCOHOL DEPENDENCE, IN REMISSION: ICD-10-CM

## 2022-03-11 DIAGNOSIS — Z82.3 FAMILY HISTORY OF STROKE: ICD-10-CM

## 2022-03-11 RX ORDER — PNV NO.95/FERROUS FUM/FOLIC AC 28MG-0.8MG
TABLET ORAL DAILY
Refills: 0 | Status: ACTIVE | COMMUNITY

## 2022-03-11 RX ORDER — FOLIC ACID 1 MG/1
1 TABLET ORAL DAILY
Refills: 0 | Status: ACTIVE | COMMUNITY

## 2022-03-14 ENCOUNTER — APPOINTMENT (OUTPATIENT)
Dept: CARDIOLOGY | Facility: CLINIC | Age: 57
End: 2022-03-14
Payer: MEDICAID

## 2022-03-14 ENCOUNTER — NON-APPOINTMENT (OUTPATIENT)
Age: 57
End: 2022-03-14

## 2022-03-14 VITALS — WEIGHT: 155 LBS

## 2022-03-14 VITALS — HEART RATE: 65 BPM | SYSTOLIC BLOOD PRESSURE: 140 MMHG | DIASTOLIC BLOOD PRESSURE: 85 MMHG

## 2022-03-14 DIAGNOSIS — Z63.4 DISAPPEARANCE AND DEATH OF FAMILY MEMBER: ICD-10-CM

## 2022-03-14 PROCEDURE — 93000 ELECTROCARDIOGRAM COMPLETE: CPT

## 2022-03-14 PROCEDURE — 99215 OFFICE O/P EST HI 40 MIN: CPT

## 2022-03-14 RX ORDER — ASPIRIN 81 MG
81 TABLET, DELAYED RELEASE (ENTERIC COATED) ORAL DAILY
Refills: 0 | Status: DISCONTINUED | COMMUNITY
End: 2022-03-14

## 2022-03-14 SDOH — SOCIAL STABILITY - SOCIAL INSECURITY: DISSAPEARANCE AND DEATH OF FAMILY MEMBER: Z63.4

## 2022-03-14 NOTE — REVIEW OF SYSTEMS
[Dyspnea on exertion] : dyspnea during exertion [Negative] : Heme/Lymph [Fever] : no fever [Chills] : no chills [SOB] : no shortness of breath [Chest Discomfort] : no chest discomfort [Lower Ext Edema] : no extremity edema [Leg Claudication] : no intermittent leg claudication [Palpitations] : no palpitations [Orthopnea] : no orthopnea [PND] : no PND [Syncope] : no syncope [FreeTextEntry2] : remote COVID; now vaccinated

## 2022-03-14 NOTE — HISTORY OF PRESENT ILLNESS
[FreeTextEntry1] : This is the first outpatient visit for Mr. Hernandez.\par \par 57 yo man\par H/o alcohol use disorder w/ prior hospitalization for alcohol withdrawl; stopped drinking since MI; no recent withdrawl\par TIA \par HTN\par Poorly controlled diabetes\par Recent chest pain and found to have inferolateral STEMI s/p BECKY to pCX 100% occlusion, pLAD 30%, dOM 100% EF 30-35%, LVEDP 36 s/p lasix 40mg IVP x1. Had IABP. Course was c/b fevers of unknown origin. Rx'd with antibiotics.\par Since d/c from hospital 2 weeks ago, he is much improved. No further chest pain. No SOB at rest; mild VILLASENOR, improving daily. If he needs to hurry, he might notice VILLASENOR. No palpitations, syncope. He has been compliant with medications since then (although, for some reason, not taking aspirin). He is due to see an endocrinologist in late March. BS's in low 200's. Not on diuretic therapy. \par \par EKG today:\par Sinus rhythm \par RSR(V1)\par Inferior infarct -age undetermined with peaked anterior T-wavesprocess; lateral ST/T wave abnormalities \par ABNORMAL ECG\par

## 2022-03-15 LAB
ALBUMIN SERPL ELPH-MCNC: 4.6 G/DL
ALP BLD-CCNC: 72 U/L
ALT SERPL-CCNC: 37 U/L
ANION GAP SERPL CALC-SCNC: 20 MMOL/L
AST SERPL-CCNC: 24 U/L
BILIRUB SERPL-MCNC: 0.2 MG/DL
BUN SERPL-MCNC: 31 MG/DL
CALCIUM SERPL-MCNC: 9.4 MG/DL
CHLORIDE SERPL-SCNC: 99 MMOL/L
CO2 SERPL-SCNC: 19 MMOL/L
CREAT SERPL-MCNC: 1.37 MG/DL
EGFR: 61 ML/MIN/1.73M2
ESTIMATED AVERAGE GLUCOSE: 246 MG/DL
GLUCOSE SERPL-MCNC: 168 MG/DL
HBA1C MFR BLD HPLC: 10.2 %
POTASSIUM SERPL-SCNC: 4.8 MMOL/L
PROT SERPL-MCNC: 7.4 G/DL
SODIUM SERPL-SCNC: 138 MMOL/L

## 2022-04-16 NOTE — HISTORY OF PRESENT ILLNESS
[FreeTextEntry1] : This is an outpatient f/u visit for Mr. Hernandez.\par \par 57 yo man\par H/o alcohol use disorder w/ prior hospitalization for alcohol withdrawl; stopped drinking since MI; no recent withdrawl\par TIA \par HTN\par Poorly controlled diabetes\par Recent inferolateral STEMI s/p BECKY to pCX 100% occlusion, pLAD 30%, dOM 100% EF 30-35%, LVEDP 36 s/p lasix 40mg IVP x1. Had IABP. Course was c/b fevers of unknown origin. Rx'd with antibiotics.\par \par At his last visit with me in March 2022, the following issues were discussed:\par STEMI (ST elevation myocardial infarction) (410.90) (I21.3)\par  · Recent inferolateral STEMI c/b fevers. Now, much better, at home and doing well. Mild\par     VILLASENOR, improving. No heart failure on exam. He is on good medications. Will uptitrate ARNI\par     and follow in a few weeks. Eventually, will need f/u echo to reassess LVEF. Medication\par     compliance reinforced. Added aspirin to his Brillinta. Will check labs at next visit.\par History of TIA (transient ischemic attack) (V12.54) (Z86.73)\par DM2 (diabetes mellitus, type 2) (250.00) (E11.9)\par  · Due to see endocrine shortly. DM management is critical here.\par Hypertension (401.9) (I10)\par  · Long h/o HTN, previously on ARB and amlodipine. Continue to monitor at home. Will\par     increase ARNI dose today and follow.\par No alcohol use\par  · Recently stopped drinking; former heavy drinker. Understands importance of sobriety.\par \par Labs March 15, 2022:\par A1c 10.2%\par K 4.8, BUN 31, creat 1.37; LFT's normal\par \par EKG today:\par \par

## 2022-04-18 ENCOUNTER — APPOINTMENT (OUTPATIENT)
Dept: CARDIOLOGY | Facility: CLINIC | Age: 57
End: 2022-04-18

## 2022-06-06 ENCOUNTER — NON-APPOINTMENT (OUTPATIENT)
Age: 57
End: 2022-06-06

## 2022-06-06 ENCOUNTER — APPOINTMENT (OUTPATIENT)
Dept: CARDIOLOGY | Facility: CLINIC | Age: 57
End: 2022-06-06
Payer: MEDICAID

## 2022-06-06 VITALS
DIASTOLIC BLOOD PRESSURE: 79 MMHG | OXYGEN SATURATION: 98 % | SYSTOLIC BLOOD PRESSURE: 111 MMHG | BODY MASS INDEX: 24.33 KG/M2 | TEMPERATURE: 97.9 F | HEART RATE: 87 BPM | WEIGHT: 155 LBS | HEIGHT: 67 IN

## 2022-06-06 PROCEDURE — 99213 OFFICE O/P EST LOW 20 MIN: CPT

## 2022-06-06 PROCEDURE — 93000 ELECTROCARDIOGRAM COMPLETE: CPT

## 2022-06-06 NOTE — HISTORY OF PRESENT ILLNESS
[FreeTextEntry1] : This is an outpatient f/u visit for Mr. Hernandez for CAD.\par \par 55 yo man\par H/o alcohol use disorder w/ prior hospitalization for alcohol withdrawl; stopped drinking since MI; no recent withdrawl\par TIA \par HTN\par Poorly controlled diabetes\par Recent inferolateral STEMI s/p BECKY to pCX 100% occlusion, pLAD 30%, dOM 100% EF 30-35%, LVEDP 36 s/p lasix 40mg IVP x1. Had IABP. Course was c/b fevers of unknown origin. Rx'd with antibiotics.\par \par At his last visit with me in March 2022, the following issues were discussed:\par STEMI (ST elevation myocardial infarction) (410.90) (I21.3)\par  · Recent inferolateral STEMI c/b fevers. Now, much better, at home and doing well. Mild\par     VILLASENOR, improving. No heart failure on exam. He is on good medications. Will uptitrate ARNI\par     and follow in a few weeks. Eventually, will need f/u echo to reassess LVEF. Medication\par     compliance reinforced. Added aspirin to his Brillinta. Will check labs at next visit.\par History of TIA (transient ischemic attack) (V12.54) (Z86.73)\par DM2 (diabetes mellitus, type 2) (250.00) (E11.9)\par  · Due to see endocrine shortly. DM management is critical here.\par Hypertension (401.9) (I10)\par  · Long h/o HTN, previously on ARB and amlodipine. Continue to monitor at home. Will\par     increase ARNI dose today and follow.\par No alcohol use\par  · Recently stopped drinking; former heavy drinker. Understands importance of sobriety.\par \par Labs March 15, 2022:\par A1c 10.2%\par K 4.8, BUN 31, creat 1.37; LFT's normal\par \par He was recently in the hospital for hitting his head, while at work; concussion, no long term injury. He remains sober since his MI. He is compliant with his medications and diet. No chest pain, SOB, PND, orthopnea, edema, syncope. He is due to see PCP in 2 weeks for repeat labs. No bleeding. \par \par EKG today performed to f/u on CAD:\par Sinus rhythm \par Old inferior infarct  \par ABNORMAL ECG\par \par

## 2022-06-06 NOTE — REVIEW OF SYSTEMS
[Negative] : Heme/Lymph [Fever] : no fever [Chills] : no chills [SOB] : no shortness of breath [Dyspnea on exertion] : not dyspnea during exertion [Chest Discomfort] : no chest discomfort [Lower Ext Edema] : no extremity edema [Leg Claudication] : no intermittent leg claudication [Palpitations] : no palpitations [Orthopnea] : no orthopnea [PND] : no PND [Syncope] : no syncope [FreeTextEntry2] : remote COVID; now vaccinated

## 2022-08-29 ENCOUNTER — RX RENEWAL (OUTPATIENT)
Age: 57
End: 2022-08-29

## 2022-09-12 ENCOUNTER — NON-APPOINTMENT (OUTPATIENT)
Age: 57
End: 2022-09-12

## 2022-09-12 ENCOUNTER — APPOINTMENT (OUTPATIENT)
Dept: CARDIOLOGY | Facility: CLINIC | Age: 57
End: 2022-09-12

## 2022-09-12 VITALS
HEART RATE: 73 BPM | OXYGEN SATURATION: 98 % | WEIGHT: 147 LBS | DIASTOLIC BLOOD PRESSURE: 81 MMHG | HEIGHT: 65 IN | BODY MASS INDEX: 24.49 KG/M2 | SYSTOLIC BLOOD PRESSURE: 144 MMHG

## 2022-09-12 PROCEDURE — 99213 OFFICE O/P EST LOW 20 MIN: CPT | Mod: 25

## 2022-09-12 PROCEDURE — 93000 ELECTROCARDIOGRAM COMPLETE: CPT

## 2022-09-12 NOTE — PHYSICAL EXAM

## 2022-09-12 NOTE — HISTORY OF PRESENT ILLNESS
[FreeTextEntry1] : This is an outpatient f/u visit for Mr. Hernandez for CAD.\par \par 56 yo man\par H/o alcohol use disorder w/ prior hospitalization for alcohol withdrawl; stopped drinking since MI; no recent withdrawl\par TIA \par HTN\par Poorly controlled diabetes\par Inferolateral STEMI s/p BECKY to pCX 100% occlusion, pLAD 30%, dOM 100% EF 30-35%, LVEDP 36 s/p lasix 40mg IVP x1. Had IABP. Course was c/b fevers of unknown origin. Rx'd with antibiotics.\par \par Labs March 15, 2022:\par A1c 10.2%\par K 4.8, BUN 31, creat 1.37; LFT's normal\par \par At his last visit with me in June 2022, the following issues were discussed:\par STEMI (ST elevation myocardial infarction) (410.90) (I21.3)\par  · Inferolateral STEMI a few months ago; doing well. States he is off alcohol and compliant\par     with medications. No heart failure on exam. He will need f/u echo to reassess LVEF.\par     Medication should continue with aspirin + Brillinta (for one year) in addition to metoprolol\par     and Entresto. He will check labs at PCP office in 2 weeks.\par Hypertension (401.9) (I10)\par  · Long h/o HTN, previously on ARB and amlodipine. Continue to monitor at home.\par DM2 (diabetes mellitus, type 2) (250.00) (E11.9)\par  · Due to see endocrine shortly. DM management is critical here. He is also on Farsiga.\par \par Since his last visit, Mr. Hernandez has not had a f/u echocardiogram. States he is compliant with his medications, but does not know them by name. States he takes a few twice a day and others once a day. I requested that he bring his medications with him to clinic next time. BP's at home variable; better controlled than in the past. He is being followed by Endocrine for his DM. PCP and Endo have been checking his labs. No chest pains or breathing issues. No prolonged palpitations; no syncope/dizziness. He is eating ok; weight stable. No swelling. Drinking a few beers "here and there"; much decreased from the past. No smoking. \par \par EKG today performed to f/u on CAD:\par Sinus rhythm \par Left atrial enlargement\par Left axis deviation \par Old inferior infarct\par ABNORMAL ECG\par \par \par \par

## 2022-09-12 NOTE — REVIEW OF SYSTEMS
[Negative] : Heme/Lymph [Fever] : no fever [Chills] : no chills [SOB] : no shortness of breath [Dyspnea on exertion] : not dyspnea during exertion [Chest Discomfort] : no chest discomfort [Lower Ext Edema] : no extremity edema [Leg Claudication] : no intermittent leg claudication [Palpitations] : no palpitations [Orthopnea] : no orthopnea [PND] : no PND [Syncope] : no syncope [FreeTextEntry2] : remote COVID; vaccinated

## 2022-10-12 ENCOUNTER — INPATIENT (INPATIENT)
Facility: HOSPITAL | Age: 57
LOS: 3 days | Discharge: HOME | End: 2022-10-16
Attending: STUDENT IN AN ORGANIZED HEALTH CARE EDUCATION/TRAINING PROGRAM | Admitting: STUDENT IN AN ORGANIZED HEALTH CARE EDUCATION/TRAINING PROGRAM

## 2022-10-12 VITALS
DIASTOLIC BLOOD PRESSURE: 98 MMHG | HEART RATE: 85 BPM | SYSTOLIC BLOOD PRESSURE: 160 MMHG | HEIGHT: 67 IN | RESPIRATION RATE: 18 BRPM | TEMPERATURE: 98 F | OXYGEN SATURATION: 98 %

## 2022-10-12 DIAGNOSIS — Z01.818 ENCOUNTER FOR OTHER PREPROCEDURAL EXAMINATION: ICD-10-CM

## 2022-10-12 DIAGNOSIS — I21.3 ST ELEVATION (STEMI) MYOCARDIAL INFARCTION OF UNSPECIFIED SITE: ICD-10-CM

## 2022-10-12 DIAGNOSIS — E11.9 TYPE 2 DIABETES MELLITUS WITHOUT COMPLICATIONS: ICD-10-CM

## 2022-10-12 DIAGNOSIS — Z29.9 ENCOUNTER FOR PROPHYLACTIC MEASURES, UNSPECIFIED: ICD-10-CM

## 2022-10-12 DIAGNOSIS — Z95.818 PRESENCE OF OTHER CARDIAC IMPLANTS AND GRAFTS: Chronic | ICD-10-CM

## 2022-10-12 DIAGNOSIS — I10 ESSENTIAL (PRIMARY) HYPERTENSION: ICD-10-CM

## 2022-10-12 DIAGNOSIS — I25.10 ATHEROSCLEROTIC HEART DISEASE OF NATIVE CORONARY ARTERY WITHOUT ANGINA PECTORIS: ICD-10-CM

## 2022-10-12 DIAGNOSIS — S51.811A LACERATION WITHOUT FOREIGN BODY OF RIGHT FOREARM, INITIAL ENCOUNTER: ICD-10-CM

## 2022-10-12 DIAGNOSIS — F10.20 ALCOHOL DEPENDENCE, UNCOMPLICATED: ICD-10-CM

## 2022-10-12 DIAGNOSIS — G45.9 TRANSIENT CEREBRAL ISCHEMIC ATTACK, UNSPECIFIED: ICD-10-CM

## 2022-10-12 DIAGNOSIS — T14.8XXA OTHER INJURY OF UNSPECIFIED BODY REGION, INITIAL ENCOUNTER: ICD-10-CM

## 2022-10-12 LAB
ALBUMIN SERPL ELPH-MCNC: 4.8 G/DL — SIGNIFICANT CHANGE UP (ref 3.3–5)
ALP SERPL-CCNC: 60 U/L — SIGNIFICANT CHANGE UP (ref 40–120)
ALT FLD-CCNC: 36 U/L — SIGNIFICANT CHANGE UP (ref 4–41)
ANION GAP SERPL CALC-SCNC: 14 MMOL/L — SIGNIFICANT CHANGE UP (ref 7–14)
APTT BLD: 27 SEC — SIGNIFICANT CHANGE UP (ref 27–36.3)
AST SERPL-CCNC: 32 U/L — SIGNIFICANT CHANGE UP (ref 4–40)
BILIRUB SERPL-MCNC: 0.6 MG/DL — SIGNIFICANT CHANGE UP (ref 0.2–1.2)
BUN SERPL-MCNC: 19 MG/DL — SIGNIFICANT CHANGE UP (ref 7–23)
CALCIUM SERPL-MCNC: 9.4 MG/DL — SIGNIFICANT CHANGE UP (ref 8.4–10.5)
CHLORIDE SERPL-SCNC: 97 MMOL/L — LOW (ref 98–107)
CO2 SERPL-SCNC: 27 MMOL/L — SIGNIFICANT CHANGE UP (ref 22–31)
CREAT SERPL-MCNC: 0.88 MG/DL — SIGNIFICANT CHANGE UP (ref 0.5–1.3)
EGFR: 102 ML/MIN/1.73M2 — SIGNIFICANT CHANGE UP
GLUCOSE BLDC GLUCOMTR-MCNC: 147 MG/DL — HIGH (ref 70–99)
GLUCOSE BLDC GLUCOMTR-MCNC: 241 MG/DL — HIGH (ref 70–99)
GLUCOSE SERPL-MCNC: 170 MG/DL — HIGH (ref 70–99)
HCT VFR BLD CALC: 43.7 % — SIGNIFICANT CHANGE UP (ref 39–50)
HGB BLD-MCNC: 14.6 G/DL — SIGNIFICANT CHANGE UP (ref 13–17)
INR BLD: 1.01 RATIO — SIGNIFICANT CHANGE UP (ref 0.88–1.16)
MANUAL SMEAR VERIFICATION: SIGNIFICANT CHANGE UP
MCHC RBC-ENTMCNC: 30.1 PG — SIGNIFICANT CHANGE UP (ref 27–34)
MCHC RBC-ENTMCNC: 33.4 GM/DL — SIGNIFICANT CHANGE UP (ref 32–36)
MCV RBC AUTO: 90.1 FL — SIGNIFICANT CHANGE UP (ref 80–100)
NRBC # BLD: 0 /100 WBCS — SIGNIFICANT CHANGE UP (ref 0–0)
NRBC # FLD: 0 K/UL — SIGNIFICANT CHANGE UP (ref 0–0)
PLAT MORPH BLD: NORMAL — SIGNIFICANT CHANGE UP
PLATELET # BLD AUTO: 192 K/UL — SIGNIFICANT CHANGE UP (ref 150–400)
PLATELET COUNT - ESTIMATE: NORMAL — SIGNIFICANT CHANGE UP
POTASSIUM SERPL-MCNC: 4.6 MMOL/L — SIGNIFICANT CHANGE UP (ref 3.5–5.3)
POTASSIUM SERPL-SCNC: 4.6 MMOL/L — SIGNIFICANT CHANGE UP (ref 3.5–5.3)
PROT SERPL-MCNC: 7.6 G/DL — SIGNIFICANT CHANGE UP (ref 6–8.3)
PROTHROM AB SERPL-ACNC: 11.7 SEC — SIGNIFICANT CHANGE UP (ref 10.5–13.4)
RBC # BLD: 4.85 M/UL — SIGNIFICANT CHANGE UP (ref 4.2–5.8)
RBC # FLD: 13 % — SIGNIFICANT CHANGE UP (ref 10.3–14.5)
RBC BLD AUTO: SIGNIFICANT CHANGE UP
SARS-COV-2 RNA SPEC QL NAA+PROBE: SIGNIFICANT CHANGE UP
SODIUM SERPL-SCNC: 138 MMOL/L — SIGNIFICANT CHANGE UP (ref 135–145)
TROPONIN T, HIGH SENSITIVITY RESULT: 20 NG/L — SIGNIFICANT CHANGE UP
WBC # BLD: 5.18 K/UL — SIGNIFICANT CHANGE UP (ref 3.8–10.5)
WBC # FLD AUTO: 5.18 K/UL — SIGNIFICANT CHANGE UP (ref 3.8–10.5)

## 2022-10-12 PROCEDURE — 99285 EMERGENCY DEPT VISIT HI MDM: CPT | Mod: 25

## 2022-10-12 PROCEDURE — 93010 ELECTROCARDIOGRAM REPORT: CPT | Mod: 59

## 2022-10-12 PROCEDURE — 73090 X-RAY EXAM OF FOREARM: CPT | Mod: 26,RT

## 2022-10-12 PROCEDURE — 99223 1ST HOSP IP/OBS HIGH 75: CPT | Mod: GC

## 2022-10-12 PROCEDURE — 12002 RPR S/N/AX/GEN/TRNK2.6-7.5CM: CPT

## 2022-10-12 RX ORDER — INSULIN LISPRO 100/ML
VIAL (ML) SUBCUTANEOUS AT BEDTIME
Refills: 0 | Status: DISCONTINUED | OUTPATIENT
Start: 2022-10-12 | End: 2022-10-16

## 2022-10-12 RX ORDER — AMPICILLIN SODIUM AND SULBACTAM SODIUM 250; 125 MG/ML; MG/ML
3 INJECTION, POWDER, FOR SUSPENSION INTRAMUSCULAR; INTRAVENOUS EVERY 6 HOURS
Refills: 0 | Status: DISCONTINUED | OUTPATIENT
Start: 2022-10-12 | End: 2022-10-12

## 2022-10-12 RX ORDER — INSULIN LISPRO 100/ML
VIAL (ML) SUBCUTANEOUS
Refills: 0 | Status: DISCONTINUED | OUTPATIENT
Start: 2022-10-12 | End: 2022-10-16

## 2022-10-12 RX ORDER — INSULIN LISPRO 100/ML
5 VIAL (ML) SUBCUTANEOUS
Refills: 0 | Status: DISCONTINUED | OUTPATIENT
Start: 2022-10-12 | End: 2022-10-13

## 2022-10-12 RX ORDER — AMPICILLIN SODIUM AND SULBACTAM SODIUM 250; 125 MG/ML; MG/ML
INJECTION, POWDER, FOR SUSPENSION INTRAMUSCULAR; INTRAVENOUS
Refills: 0 | Status: DISCONTINUED | OUTPATIENT
Start: 2022-10-12 | End: 2022-10-14

## 2022-10-12 RX ORDER — SODIUM CHLORIDE 9 MG/ML
1000 INJECTION, SOLUTION INTRAVENOUS
Refills: 0 | Status: DISCONTINUED | OUTPATIENT
Start: 2022-10-12 | End: 2022-10-16

## 2022-10-12 RX ORDER — MORPHINE SULFATE 50 MG/1
2 CAPSULE, EXTENDED RELEASE ORAL ONCE
Refills: 0 | Status: DISCONTINUED | OUTPATIENT
Start: 2022-10-12 | End: 2022-10-12

## 2022-10-12 RX ORDER — DEXTROSE 50 % IN WATER 50 %
25 SYRINGE (ML) INTRAVENOUS ONCE
Refills: 0 | Status: DISCONTINUED | OUTPATIENT
Start: 2022-10-12 | End: 2022-10-16

## 2022-10-12 RX ORDER — ACETAMINOPHEN 500 MG
650 TABLET ORAL EVERY 6 HOURS
Refills: 0 | Status: DISCONTINUED | OUTPATIENT
Start: 2022-10-12 | End: 2022-10-16

## 2022-10-12 RX ORDER — AMPICILLIN SODIUM AND SULBACTAM SODIUM 250; 125 MG/ML; MG/ML
3 INJECTION, POWDER, FOR SUSPENSION INTRAMUSCULAR; INTRAVENOUS ONCE
Refills: 0 | Status: COMPLETED | OUTPATIENT
Start: 2022-10-12 | End: 2022-10-12

## 2022-10-12 RX ORDER — AMPICILLIN SODIUM AND SULBACTAM SODIUM 250; 125 MG/ML; MG/ML
3 INJECTION, POWDER, FOR SUSPENSION INTRAMUSCULAR; INTRAVENOUS EVERY 6 HOURS
Refills: 0 | Status: DISCONTINUED | OUTPATIENT
Start: 2022-10-13 | End: 2022-10-14

## 2022-10-12 RX ORDER — ONDANSETRON 8 MG/1
4 TABLET, FILM COATED ORAL EVERY 8 HOURS
Refills: 0 | Status: DISCONTINUED | OUTPATIENT
Start: 2022-10-12 | End: 2022-10-16

## 2022-10-12 RX ORDER — TETANUS TOXOID, REDUCED DIPHTHERIA TOXOID AND ACELLULAR PERTUSSIS VACCINE, ADSORBED 5; 2.5; 8; 8; 2.5 [IU]/.5ML; [IU]/.5ML; UG/.5ML; UG/.5ML; UG/.5ML
0.5 SUSPENSION INTRAMUSCULAR ONCE
Refills: 0 | Status: COMPLETED | OUTPATIENT
Start: 2022-10-12 | End: 2022-10-12

## 2022-10-12 RX ORDER — INSULIN GLARGINE 100 [IU]/ML
15 INJECTION, SOLUTION SUBCUTANEOUS AT BEDTIME
Refills: 0 | Status: DISCONTINUED | OUTPATIENT
Start: 2022-10-12 | End: 2022-10-16

## 2022-10-12 RX ORDER — GLUCAGON INJECTION, SOLUTION 0.5 MG/.1ML
1 INJECTION, SOLUTION SUBCUTANEOUS ONCE
Refills: 0 | Status: DISCONTINUED | OUTPATIENT
Start: 2022-10-12 | End: 2022-10-16

## 2022-10-12 RX ORDER — DEXTROSE 50 % IN WATER 50 %
15 SYRINGE (ML) INTRAVENOUS ONCE
Refills: 0 | Status: DISCONTINUED | OUTPATIENT
Start: 2022-10-12 | End: 2022-10-16

## 2022-10-12 RX ORDER — LANOLIN ALCOHOL/MO/W.PET/CERES
3 CREAM (GRAM) TOPICAL AT BEDTIME
Refills: 0 | Status: DISCONTINUED | OUTPATIENT
Start: 2022-10-12 | End: 2022-10-16

## 2022-10-12 RX ORDER — DEXTROSE 50 % IN WATER 50 %
12.5 SYRINGE (ML) INTRAVENOUS ONCE
Refills: 0 | Status: DISCONTINUED | OUTPATIENT
Start: 2022-10-12 | End: 2022-10-16

## 2022-10-12 RX ADMIN — Medication 650 MILLIGRAM(S): at 22:11

## 2022-10-12 RX ADMIN — INSULIN GLARGINE 15 UNIT(S): 100 INJECTION, SOLUTION SUBCUTANEOUS at 23:26

## 2022-10-12 RX ADMIN — TETANUS TOXOID, REDUCED DIPHTHERIA TOXOID AND ACELLULAR PERTUSSIS VACCINE, ADSORBED 0.5 MILLILITER(S): 5; 2.5; 8; 8; 2.5 SUSPENSION INTRAMUSCULAR at 15:32

## 2022-10-12 RX ADMIN — MORPHINE SULFATE 2 MILLIGRAM(S): 50 CAPSULE, EXTENDED RELEASE ORAL at 23:26

## 2022-10-12 RX ADMIN — AMPICILLIN SODIUM AND SULBACTAM SODIUM 200 GRAM(S): 250; 125 INJECTION, POWDER, FOR SUSPENSION INTRAMUSCULAR; INTRAVENOUS at 16:08

## 2022-10-12 RX ADMIN — AMPICILLIN SODIUM AND SULBACTAM SODIUM 200 GRAM(S): 250; 125 INJECTION, POWDER, FOR SUSPENSION INTRAMUSCULAR; INTRAVENOUS at 23:27

## 2022-10-12 NOTE — H&P ADULT - PROBLEM SELECTOR PLAN 6
- - Reports decreased frequency of drinking after stents  - Continue to monitor for any signs of withdrawal - Hx Inferolateral STEMI s/p BECKY to pCX 100% occlusion, pLAD 30%, dOM 100% EF 30-35%, LVEDP 36 (3/1/22), IABP placed  - Echo needed to reassess LVEF  - C/w Asa 81mg QD, sacubitril-valsartan 24mg-26mg BID, metoprolol succinate 50mg QD, ticagrelor 90 mg Q12H -c/w home BP medications

## 2022-10-12 NOTE — H&P ADULT - PROBLEM SELECTOR PLAN 5
- Reports decreased frequency of drinking after stents  - Continue to monitor - Hx Inferolateral STEMI s/p BECKY to pCX 100% occlusion, pLAD 30%, dOM 100% EF 30-35%, LVEDP 36 (3/1/22), IABP placed  - Echo needed to reassess LVEF  - C/w Asa 81mg QD, sacubitril-valsartan 24mg-26mg BID, metoprolol succinate 50mg QD, ticagrelor 90 mg Q12H - s/p stent   - C/w Asa 81mg QD, sacubitril-valsartan 24mg-26mg BID, metoprolol succinate 50mg QD, ticagrelor 90 mg Q12H

## 2022-10-12 NOTE — H&P ADULT - PROBLEM SELECTOR PLAN 7
- DVT ppx: lovenox; hold for now  - Diet: CC; NPO at midnight pending hand surgery eval  - Code status: Full Code - C/w Asa 81mg QD, sacubitril-valsartan 24mg-26mg BID, metoprolol succinate 50mg QD, ticagrelor 90 mg Q12H - Reports decreased frequency of drinking after stents  - Continue to monitor for any signs of withdrawal - Monitor off CIWA  - SW cs requested   - Continue to monitor for any signs of withdrawal

## 2022-10-12 NOTE — H&P ADULT - NS ATTEST RISK PROBLEM GEN_ALL_CORE FT
Deep R forearm laceration with exposed muscle and tendon; suspected tendon injury; Formal Plastic c/s and OR intervention pending;  Pain control requiring Morphine IVP

## 2022-10-12 NOTE — H&P ADULT - MUSCULOSKELETAL
no joint swelling/no joint erythema/no calf tenderness/decreased ROM/strength 5/5 bilateral lower extremities details…

## 2022-10-12 NOTE — ED ADULT NURSE NOTE - OBJECTIVE STATEMENT
pt ambulatory to triage, was repairing pipe in kitchen that fell on him,  lac to right wrist- bleeding controlled. neuro intact. limited ROM to wrist.

## 2022-10-12 NOTE — H&P ADULT - NSHPLABSRESULTS_GEN_ALL_CORE
< from: Xray Forearm, Right (10.12.22 @ 15:40) >    ******PRELIMINARY REPORT******           INTERPRETATION:  No acute fracture or dislocation.      < end of copied text > .    -personally interpreted EKG: nsr, 81bpm, qtc 443, no acute tw or st changes, no pacs or pvcs     -personally reviewed Xray Forearm, Right (10.12.22 @ 15:40)    PRELIMINARY REPORT: No acute fracture or dislocation.    -personally reviewed labs:                          14.6   5.18  )-----------( 192      ( 12 Oct 2022 16:05 )             43.7   10-12    138  |  97<L>  |  19  ----------------------------<  170<H>  4.6   |  27  |  0.88    Ca    9.4      12 Oct 2022 16:05    TPro  7.6  /  Alb  4.8  /  TBili  0.6  /  DBili  x   /  AST  32  /  ALT  36  /  AlkPhos  60  10-12

## 2022-10-12 NOTE — ED PROVIDER NOTE - CLINICAL SUMMARY MEDICAL DECISION MAKING FREE TEXT BOX
Per Jennifer at [facility] hospice, she will be meeting with Pt and sons tomorrow at 11am at hospital. Updated RN and CM.    SHANNAN Figueroa  4:19pm    MSW met with Pt and son Jez at bedside to discuss hospice per MD order for [facility] hospice evaluation. Explained hospice, offered choice of agency, and discussed Medicare coverage. Pt/son are not sure if they feel Pt should go home with hospice and possibly hire additional care vs considering going to nursing home. Explained private pay cost of nursing home if under hospice. They are agreeable to meeting with [facility] hospice but Pt requests meeting to be tomorrow. She states she wants to process everything and hear the other MD's input also. MSW made referral to [facility] and requested that they contact son to arrange meeting for tomorrow late morning if possible. Updated CM and will follow.    concern for tendon lac, hand consulted. requesting volar splint, xray tetanus and unasyn q6hr.  admit to medicine for cards clearance and tendon repair

## 2022-10-12 NOTE — H&P ADULT - NSHPPHYSICALEXAM_GEN_ALL_CORE
VITALS:   T(C): 36.8 (10-12-22 @ 19:20), Max: 36.8 (10-12-22 @ 19:20)  HR: 86 (10-12-22 @ 19:20) (85 - 86)  BP: 150/97 (10-12-22 @ 19:20) (150/97 - 160/98)  RR: 18 (10-12-22 @ 19:20) (18 - 18)  SpO2: 98% (10-12-22 @ 19:20) (98% - 98%)    PHYSICAL EXAM:     GENERAL: NAD, lying in bed comfortably  HEAD:  Atraumatic, Normocephalic  EYES: EOMI, PERRLA, conjunctiva and sclera clear  ENT: Moist mucous membranes  NECK: Supple, No JVD  CHEST/LUNG: Clear to auscultation bilaterally; No rales, rhonchi, wheezing, or rubs. Unlabored respirations  HEART: Regular rate and rhythm; No murmurs, rubs, or gallops  ABDOMEN: normal bowel sounds; Soft, nontender, nondistended  EXTREMITIES:  2+ Peripheral Pulses, brisk capillary refill. No clubbing, cyanosis, or edema  Neurological:  A&Ox3, no focal deficits   SKIN: No rashes or lesions  PSYCH: normal affect and mood VITALS:   T(C): 36.8 (10-12-22 @ 19:20), Max: 36.8 (10-12-22 @ 19:20)  HR: 86 (10-12-22 @ 19:20) (85 - 86)  BP: 150/97 (10-12-22 @ 19:20) (150/97 - 160/98)  RR: 18 (10-12-22 @ 19:20) (18 - 18)  SpO2: 98% (10-12-22 @ 19:20) (98% - 98%)    PHYSICAL EXAM:     GENERAL: NAD, lying in bed comfortably  HEAD:  Atraumatic, Normocephalic  EYES: EOMI, PERRLA, conjunctiva and sclera clear  ENT: Moist mucous membranes  NECK: Supple, No JVD  CHEST/LUNG: Clear to auscultation bilaterally; No rales, rhonchi, wheezing, or rubs. Unlabored respirations  HEART: Regular rate and rhythm; No murmurs, rubs, or gallops  ABDOMEN: normal bowel sounds; Soft, nontender, nondistended  MSK: +limited ROM R forearm  EXTREMITIES:  2+ Peripheral Pulses, brisk capillary refill. No clubbing, cyanosis, or edema  Neurological: +reduced  strength L>R, difficulty with wrist flexion and extension; A&Ox3, no focal deficits, sensation intact  SKIN: +R forearm 3 inch laceration, TTP  PSYCH: normal affect and mood Vital Signs Last 24 Hrs  T(C): 36.4 (13 Oct 2022 00:40), Max: 36.8 (12 Oct 2022 19:20)  T(F): 97.6 (13 Oct 2022 00:40), Max: 98.2 (12 Oct 2022 19:20)  HR: 63 (13 Oct 2022 00:40) (63 - 86)  BP: 148/86 (13 Oct 2022 00:40) (148/86 - 160/98)  BP(mean): --  RR: 17 (13 Oct 2022 00:40) (16 - 18)  SpO2: 99% (13 Oct 2022 00:40) (98% - 100%)    Parameters below as of 13 Oct 2022 00:40  Patient On (Oxygen Delivery Method): room air      PHYSICAL EXAM:     GENERAL: NAD, lying in bed comfortably  HEAD:  Atraumatic, Normocephalic  EYES: EOMI, PERRLA, conjunctiva and sclera clear  ENT: Moist mucous membranes  NECK: Supple, No JVD  CHEST/LUNG: Clear to auscultation bilaterally; No rales, rhonchi, wheezing, or rubs. Unlabored respirations  HEART: Regular rate and rhythm; No murmurs, rubs, or gallops  ABDOMEN: normal bowel sounds; Soft, nontender, nondistended  MSK: +limited ROM R forearm  EXTREMITIES:  2+ Peripheral Pulses, brisk capillary refill. No clubbing, cyanosis, or edema  Neurological: +reduced  strength L>R, difficulty with wrist flexion and extension; A&Ox3, no focal deficits, sensation intact  SKIN: +R forearm 3 inch laceration, TTP  PSYCH: normal affect and mood Vital Signs Last 24 Hrs  T(C): 36.4 (13 Oct 2022 00:40), Max: 36.8 (12 Oct 2022 19:20)  T(F): 97.6 (13 Oct 2022 00:40), Max: 98.2 (12 Oct 2022 19:20)  HR: 63 (13 Oct 2022 00:40) (63 - 86)  BP: 148/86 (13 Oct 2022 00:40) (148/86 - 160/98)  BP(mean): --  RR: 17 (13 Oct 2022 00:40) (16 - 18)  SpO2: 99% (13 Oct 2022 00:40) (98% - 100%)    Parameters below as of 13 Oct 2022 00:40  Patient On (Oxygen Delivery Method): room air    GENERAL: NAD, lying in bed comfortably  HEAD:  Atraumatic, Normocephalic  EYES: EOMI, PERRLA, conjunctiva and sclera clear  ENT: Moist mucous membranes  NECK: Supple, No JVD  CHEST/LUNG: Clear to auscultation bilaterally; No rales, rhonchi, wheezing, or rubs. Unlabored respirations  HEART: Regular rate and rhythm; No murmurs, rubs, or gallops  ABDOMEN: normal bowel sounds; Soft, nontender, nondistended  MSK: +limited ROM R forearm  EXTREMITIES:  2+ Peripheral Pulses, brisk capillary refill. No clubbing, cyanosis, or edema  Neurological: +reduced  strength L>R, difficulty with wrist flexion and extension - limited exam due to severe pain   PSYCH: normal affect and mood    additional PE below

## 2022-10-12 NOTE — H&P ADULT - NSHPREVIEWOFSYSTEMS_GEN_ALL_CORE
REVIEW OF SYSTEMS:    Constitutional: No fever, chills, night sweats, or fatigue  	Eyes:  No eye pain, visual disturbances, or discharge  	ENMT:  No neck pain  	Cardiac:  No chest pain, palpitations, no leg swelling  	Respiratory:  No cough, SOB  	GI:  No nausea, vomiting, diarrhea, abdominal pain.  	:  no dysuria, hematuria, or incontinence  	MS:  No back pain.  	Neuro:  No headache or lightheadedness, dizziness   	Skin:  No skin rash REVIEW OF SYSTEMS:    Constitutional: No fever, chills, night sweats, or fatigue  	Eyes:  No eye pain, visual disturbances, or discharge  	ENMT:  No neck pain  	Cardiac:  No chest pain, palpitations, no leg swelling  	Respiratory:  No cough, SOB  	GI:  No nausea, vomiting, diarrhea, abdominal pain.  	:  no dysuria, hematuria, or incontinence  	MS:  No back pain.  	Neuro:  No headache or lightheadedness, dizziness   	Skin:  +3 inch R forearm laceration

## 2022-10-12 NOTE — H&P ADULT - HISTORY OF PRESENT ILLNESS
58 yo male with PMH HTN, Diabetes mellitus Type 2, CAD, ETOH abuse who presents with a R forearm laceration. Pt reports that he was fixing a 5 foot pipe over the stove in the afternoon when it fell on his R forearm and got a cut about 3 inches wide. Pt reports no loss of sensation in affected area and reports intact sensation in fingers and arm. Pt denies any other associated injuries, dizziness, HA, N/V/D, fevers, chills, SOB, chest pain/tightness, abdominal pain, vision changes, and lower extremity edema. Pt rates pain 7/10 currently.     S/p tetanus vaccine in ED.  58 yo male with MHx of HTN, Diabetes mellitus Type 2, CAD, ETOH overuse c/o R forearm laceration. Pt reports that he was fixing a 5 foot pipe over the stove in the afternoon when it fell on his R forearm causing a cut about "3 inches wide". Pt reports no loss of sensation in affected area and reports intact sensation in fingers and arm. Pt denies any other associated injuries, and no CP dizziness, HA, N/V/D, fevers, chills, SOB, chest pain/tightness, abdominal pain, vision changes prior to this incident. Pt rates pain 7/10 currently.     S/p tetanus vaccine in ED.  58 yo male with MHx of HTN, Diabetes mellitus Type 2, CAD, EtOH overuse; Pt c/o Rt forearm laceration and pain 8/10. Pt reports that he was fixing a 5 foot pipe over the stove in the afternoon when it fell on his R forearm causing a cut about "3 inches wide". Pt reports weakness in Rt thumb flexion and no loss of sensation in affected area. Reports intact sensation in fingers and Rt arm. Pt reports no other associated injuries. Also reports no CP dizziness, HA, N/V/D, fevers, chills, SOB, chest pain/tightness, abdominal pain, vision changes prior to this incident.  Currently Pt rates pain 7/10.     ED course: s/p tetanus vaccine, s/p suturing, Plastic/Hand Sx consult, requested - pending  58 yo male with MHx of HTN, Diabetes mellitus Type 2, CAD, EtOH overuse, recent STEMI: February 2022 s/p BECKY on DAPT; Pt c/o Rt forearm laceration and pain 8/10. Pt reports that he was fixing a 5 foot pipe over the stove in the afternoon when it fell on his R forearm causing a cut about "3 inches wide". Pt reports weakness in Rt thumb flexion and no loss of sensation in affected area. Reports intact sensation in fingers and Rt arm. Pt reports no other associated injuries. Also reports no CP dizziness, HA, N/V/D, fevers, chills, SOB, chest pain/tightness, abdominal pain, vision changes prior to this incident.  Currently Pt rates pain 7/10.     ED course: s/p tetanus vaccine, s/p suturing, Plastic/Hand Sx consult, requested - pending

## 2022-10-12 NOTE — H&P ADULT - NSICDXPASTMEDICALHX_GEN_ALL_CORE_FT
PAST MEDICAL HISTORY:  Diabetes     EtOH dependence     HTN (hypertension)     Transient cerebral ischemic attack      PAST MEDICAL HISTORY:  CAD (coronary artery disease)     Diabetes     EtOH dependence     HTN (hypertension)     Prophylactic measure     Transient cerebral ischemic attack

## 2022-10-12 NOTE — H&P ADULT - ATTENDING COMMENTS
Pt asked that we send in paperwork for home inr testing machine. Paperwork filled out and sent to [employer].
58 yo male with MHx of HTN, Diabetes mellitus Type 2, CAD, EtOH overuse a/w deep R forearm laceration with exposed muscle and tendon; suspected tendon injury; Formal Plastic c/s and OR intervention pending;    Assessment and plan modified and supplemented where indicated;

## 2022-10-12 NOTE — H&P ADULT - ASSESSMENT
58 yo male with PMH HTN, Diabetes mellitus Type 2, CAD, ETOH abuse who presents with a R forearm laceration. 58 yo male with PMH HTN, Diabetes mellitus Type 2, CAD, ETOH, STEMI s/p stent, alcohol abuse who presents with a R forearm laceration. 56 yo male with MHx of HTN, Diabetes mellitus Type 2, CAD, EtOH overuse a/w deep R forearm laceration with exposed muscle and tendon; suspected tendon injury; Formal Plastic c/s and OR intervention pending;

## 2022-10-12 NOTE — H&P ADULT - ADDITIONAL PE
Rt forearm exam per ED: Flap type laceration, dorsal, 6cm, with sanguineous drainage, exposed muscle and tendon

## 2022-10-12 NOTE — H&P ADULT - PROBLEM SELECTOR PROBLEM 6
Transient cerebral ischemic attack EtOH dependence STEMI (ST elevation myocardial infarction) HTN (hypertension)

## 2022-10-12 NOTE — H&P ADULT - PROBLEM SELECTOR PLAN 1
- 3 inch long laceration w/ no focal neuro deficits, all sensation intact, reduced  strength R>L, difficulty with wrist extension and flexion  - VSS, no leukocytosis  - s/p tetanus in ED  - tylenol PRN for pain  - XR: prelim read shows no fracture or broken bones  - a1c  - consult hand surgery for eval - 3 inch long laceration w/ no focal neuro deficits, all sensation intact, reduced  strength R>L, difficulty with wrist extension and flexion  - VSS, no leukocytosis  - s/p tetanus in ED  - tylenol PRN for mild pain, morphine 5 mg   - forearm XR: prelim read shows no fracture or broken bones  - a1c  - Hand surgery consulted (Dr. Sage) and discussed regarding pending surgery on Friday 10/14/22 iso recent MI and cardio clearance needed  - IV Unasyn Q6H Flap type laceration, dorsal, 6cm, with sanguineous drainage, exposed muscle and tendon;   Injury Complication per ED:  tendon injury, known tendon laceration prior to stitching   - VSS, no leukocytosis  - s/p tetanus in ED  - Pain control: Morphine IV 2mg and 4mg for moderate and sever pain respectively  - forearm XR: prelim read shows no fracture or broken bones  - Hand surgery consulted (Dr. Sage) and discussed regarding pending surgery hope. for Friday 10/14/22 iso recent MI  - IV Unasyn ppx Q6H started Flap type laceration, dorsal, 6cm, with sanguineous drainage, exposed muscle and tendon;   Injury complication per ED:  tendon injury, known tendon laceration prior to stitching   - VSS, no leukocytosis  - s/p tetanus in ED  - Pain control: Morphine IVP: 2mg and 4mg for moderate and severe pain respectively  - forearm XR: prelim read shows no fracture  - Hand surgery consulted (Dr. Sage) and discussed regarding pending surgery hope. for Friday 10/14/22 iso recent MI  - IV Unasyn ppx Q6H started

## 2022-10-12 NOTE — H&P ADULT - PROBLEM SELECTOR PROBLEM 3
Diabetes HTN (hypertension) Type 2 diabetes mellitus with hyperglycemia, with long-term current use of insulin

## 2022-10-12 NOTE — H&P ADULT - MOTOR
strength: grossly 5/5 flexion-extension b/l LE and Lt UE  Limited exam of Rt forearm due to severe pain

## 2022-10-12 NOTE — H&P ADULT - NSICDXFAMILYHX_GEN_ALL_CORE_FT
FAMILY HISTORY:  Mother  Still living? Unknown  FH: stroke, Age at diagnosis: Age Unknown     FAMILY HISTORY:  Mother  Still living? Unknown  FH: diabetes mellitus, Age at diagnosis: Age Unknown  FH: stroke, Age at diagnosis: Age Unknown

## 2022-10-12 NOTE — ED ADULT NURSE NOTE - CHIEF COMPLAINT QUOTE
CC: trauma right eye  HPI: Willam Mills is a  30 year old year-old patient with history of trauma to the right eye with a baseball yesterday in [place]. Referred here for management    Retinal Imaging:  bscan right eye   RE: vitreous hemorrahge, lens subluxation, choroidal hemorrhage, possible RD     Assessment & Plan:  1- trauma right eye   - possible Ruptured globe right eye   - possible  posterior rupture given hyperdeep chamber and US and CT   - need to have exploration and possible repair today   - if too posterior rupture globe is found, will need to await closure of posterior rupture prior to exploration and rapair by retina   - will prescribe pain meds   - follow up 1 week with retina    - Might need vitrectomy surgery in the future (PPV/SB/EL/FAX/SO/ possible lensectomy)    2- orbital floor fx   - seen on CT scan    - no obvious pneumo-orbit by palpation or on CT   - pain to palpation of the infraorbital rim   - decreased V2 sensation    - patient with pain to movement of the eye supra and infra duction    -  oculoplastics consulted      Chele Madsen MD, PhD  Vitreoretinal Surgery Fellow    ~~~~~~~~~~~~~~~~~~~~~~~~~~~~~~~~~~   Complete documentation of historical and exam elements from today's encounter can be found in the full encounter summary report (not reduplicated in this progress note).  I personally obtained the chief complaint(s) and history of present illness.  I confirmed and edited as necessary the review of systems, past medical/surgical history, family history, social history, and examination findings as documented by others; and I examined the patient myself.  I personally reviewed the relevant tests, images, and reports as documented above.  I personally reviewed the ophthalmic test(s) associated with this encounter, agree with the interpretation(s) as documented by the resident/fellow, and have edited the corresponding report(s) as necessary.   I formulated and edited as necessary  the assessment and plan and discussed the findings and management plan with the patient and family    Norma Aleman MD  .  Retina Service   Department of Ophthalmology and Visual Neurosciences   [facility]  Phone: [phone]   Fax: [phone]          p/t c/o  of lac to rt wrist s/p heavy object fell on hand this am, no acute bleeding noted

## 2022-10-12 NOTE — H&P ADULT - PROBLEM SELECTOR PLAN 2
- C/w home meds (ARB + amlodipine)? Previously on (ARB + amlodipine)? - 3 inch long laceration w/ no focal neuro deficits, all sensation intact, reduced  strength R>L, difficulty with wrist extension and flexion  - s/p sutures in ED until surgery on Friday 10/14  - Hand surgery consulted (Dr. Sage) and discussed regarding pending hand surgery on Friday 10/14/22 iso recent MI and cardio clearance needed  - IV Unasyn Q6H - s/p sutures in ED until surgery [facility]. for Friday 10/14  - Hand surgery consulted (Dr. Sage) and discussed regarding pending hand surgery on Friday 10/14/22  - iso of recent MI Hand surgery requesting Cardiologic clearance   - IV Unasyn Q6H - s/p sutures in ED until surgery [facility]. for Friday 10/14  - Hand surgery consulted (Dr. Sage) and discussed regarding pending hand surgery on Friday 10/14/22  - iso of recent MI Hand surgery requesting Cardiologic clearance   - IV Unasyn Q6H  -TTE ordered

## 2022-10-12 NOTE — H&P ADULT - NSHPSOCIALHISTORY_GEN_ALL_CORE
Pt reports he is . Pt reports he does no smoke and reports being a former heavy drinker, now drinking 1 pint/weekend. Pt is employed as a  for an [employer] company. Pt reports he is .   Pt reports he does no smoke and reports being a former heavy drinker, now drinking 1 pint/weekend.  Pt is employed as a  for an [employer] company.

## 2022-10-12 NOTE — H&P ADULT - PROBLEM SELECTOR PLAN 3
- C/w home NovoLIN 70/30  - 30 units in AM, 15 units in PM Previously on (ARB + amlodipine)  - monitor and c/w home BP medications -c/w home BP medications Hold oral agents; On Basaglar and Afrezza;   - Started on 15 Lantus qhs   - Admelog 2 units TID, ISS premeal and bedtime  - IASS  - A1c  - DM diet

## 2022-10-12 NOTE — H&P ADULT - PROBLEM SELECTOR PLAN 8
- DVT ppx: lovenox; hold for now  - Diet: CC; NPO at midnight pending hand surgery eval  - Code status: Full Code - C/w Asa 81mg QD, sacubitril-valsartan 24mg-26mg BID, metoprolol succinate 50mg QD, ticagrelor 90 mg Q12H - DVT ppx: Low risk   - Diet: CC with snack

## 2022-10-12 NOTE — ED PROVIDER NOTE - OBJECTIVE STATEMENT
57 year old pmh htn dm cad etoh abuse presents wioth lac to right forearm. sharp pipe fell on his arm. unable to extend right forearm.  no other injuries. tetanus unknown.  bleeding controlled with pressure dressing.

## 2022-10-12 NOTE — H&P ADULT - PROBLEM SELECTOR PLAN 4
- s/p stent   - C/w Asa 81mg QD, sacubitril-valsartan 24mg-26mg BID, metoprolol succinate 50mg QD, ticagrelor 90 mg Q12H - NovoLIN 70/30  - 30 units in AM, 15 units in PM  - Started on 15 lantus, 5 units TID, ISS premeal and bedtime Hx of inferolateral STEMI s/p BECKY to pCX 100% occlusion, pLAD 30%, dOM 100% EF 30-35%, LVEDP 36 (3/1/22), IABP placed  - 2D Echo needed to reassess LVEF (pt did not f/u)  - c/w Asa 81mg QD, sacubitril-valsartan 24mg-26mg BID, metoprolol succinate 50mg QD, ticagrelor 90 mg Q12H

## 2022-10-13 ENCOUNTER — TRANSCRIPTION ENCOUNTER (OUTPATIENT)
Age: 57
End: 2022-10-13

## 2022-10-13 DIAGNOSIS — E11.65 TYPE 2 DIABETES MELLITUS WITH HYPERGLYCEMIA: ICD-10-CM

## 2022-10-13 LAB
A1C WITH ESTIMATED AVERAGE GLUCOSE RESULT: 10.6 % — HIGH (ref 4–5.6)
ALBUMIN SERPL ELPH-MCNC: 3.9 G/DL — SIGNIFICANT CHANGE UP (ref 3.3–5)
ALP SERPL-CCNC: 53 U/L — SIGNIFICANT CHANGE UP (ref 40–120)
ALT FLD-CCNC: 43 U/L — HIGH (ref 4–41)
ANION GAP SERPL CALC-SCNC: 11 MMOL/L — SIGNIFICANT CHANGE UP (ref 7–14)
AST SERPL-CCNC: 42 U/L — HIGH (ref 4–40)
BASOPHILS # BLD AUTO: 0.04 K/UL — SIGNIFICANT CHANGE UP (ref 0–0.2)
BASOPHILS NFR BLD AUTO: 0.8 % — SIGNIFICANT CHANGE UP (ref 0–2)
BILIRUB SERPL-MCNC: 0.9 MG/DL — SIGNIFICANT CHANGE UP (ref 0.2–1.2)
BUN SERPL-MCNC: 17 MG/DL — SIGNIFICANT CHANGE UP (ref 7–23)
CALCIUM SERPL-MCNC: 8.5 MG/DL — SIGNIFICANT CHANGE UP (ref 8.4–10.5)
CHLORIDE SERPL-SCNC: 98 MMOL/L — SIGNIFICANT CHANGE UP (ref 98–107)
CHOLEST SERPL-MCNC: 188 MG/DL — SIGNIFICANT CHANGE UP
CO2 SERPL-SCNC: 27 MMOL/L — SIGNIFICANT CHANGE UP (ref 22–31)
CREAT SERPL-MCNC: 0.88 MG/DL — SIGNIFICANT CHANGE UP (ref 0.5–1.3)
EGFR: 102 ML/MIN/1.73M2 — SIGNIFICANT CHANGE UP
EOSINOPHIL # BLD AUTO: 0.04 K/UL — SIGNIFICANT CHANGE UP (ref 0–0.5)
EOSINOPHIL NFR BLD AUTO: 0.8 % — SIGNIFICANT CHANGE UP (ref 0–6)
ESTIMATED AVERAGE GLUCOSE: 258 — SIGNIFICANT CHANGE UP
GLUCOSE BLDC GLUCOMTR-MCNC: 205 MG/DL — HIGH (ref 70–99)
GLUCOSE BLDC GLUCOMTR-MCNC: 281 MG/DL — HIGH (ref 70–99)
GLUCOSE BLDC GLUCOMTR-MCNC: 331 MG/DL — HIGH (ref 70–99)
GLUCOSE BLDC GLUCOMTR-MCNC: 88 MG/DL — SIGNIFICANT CHANGE UP (ref 70–99)
GLUCOSE SERPL-MCNC: 88 MG/DL — SIGNIFICANT CHANGE UP (ref 70–99)
HCT VFR BLD CALC: 43.2 % — SIGNIFICANT CHANGE UP (ref 39–50)
HDLC SERPL-MCNC: 94 MG/DL — SIGNIFICANT CHANGE UP
HGB BLD-MCNC: 14.4 G/DL — SIGNIFICANT CHANGE UP (ref 13–17)
IANC: 3.52 K/UL — SIGNIFICANT CHANGE UP (ref 1.8–7.4)
IMM GRANULOCYTES NFR BLD AUTO: 0.4 % — SIGNIFICANT CHANGE UP (ref 0–0.9)
LIPID PNL WITH DIRECT LDL SERPL: 80 MG/DL — SIGNIFICANT CHANGE UP
LYMPHOCYTES # BLD AUTO: 1.15 K/UL — SIGNIFICANT CHANGE UP (ref 1–3.3)
LYMPHOCYTES # BLD AUTO: 22.2 % — SIGNIFICANT CHANGE UP (ref 13–44)
MCHC RBC-ENTMCNC: 29.9 PG — SIGNIFICANT CHANGE UP (ref 27–34)
MCHC RBC-ENTMCNC: 33.3 GM/DL — SIGNIFICANT CHANGE UP (ref 32–36)
MCV RBC AUTO: 89.8 FL — SIGNIFICANT CHANGE UP (ref 80–100)
MONOCYTES # BLD AUTO: 0.42 K/UL — SIGNIFICANT CHANGE UP (ref 0–0.9)
MONOCYTES NFR BLD AUTO: 8.1 % — SIGNIFICANT CHANGE UP (ref 2–14)
NEUTROPHILS # BLD AUTO: 3.52 K/UL — SIGNIFICANT CHANGE UP (ref 1.8–7.4)
NEUTROPHILS NFR BLD AUTO: 67.7 % — SIGNIFICANT CHANGE UP (ref 43–77)
NON HDL CHOLESTEROL: 94 MG/DL — SIGNIFICANT CHANGE UP
NRBC # BLD: 0 /100 WBCS — SIGNIFICANT CHANGE UP (ref 0–0)
NRBC # FLD: 0 K/UL — SIGNIFICANT CHANGE UP (ref 0–0)
PLATELET # BLD AUTO: 162 K/UL — SIGNIFICANT CHANGE UP (ref 150–400)
POTASSIUM SERPL-MCNC: 3.6 MMOL/L — SIGNIFICANT CHANGE UP (ref 3.5–5.3)
POTASSIUM SERPL-SCNC: 3.6 MMOL/L — SIGNIFICANT CHANGE UP (ref 3.5–5.3)
PROT SERPL-MCNC: 6.4 G/DL — SIGNIFICANT CHANGE UP (ref 6–8.3)
RBC # BLD: 4.81 M/UL — SIGNIFICANT CHANGE UP (ref 4.2–5.8)
RBC # FLD: 12.9 % — SIGNIFICANT CHANGE UP (ref 10.3–14.5)
SODIUM SERPL-SCNC: 136 MMOL/L — SIGNIFICANT CHANGE UP (ref 135–145)
TRIGL SERPL-MCNC: 72 MG/DL — SIGNIFICANT CHANGE UP
WBC # BLD: 4.69 K/UL — SIGNIFICANT CHANGE UP (ref 3.8–10.5)
WBC # FLD AUTO: 4.69 K/UL — SIGNIFICANT CHANGE UP (ref 3.8–10.5)

## 2022-10-13 PROCEDURE — 99251: CPT | Mod: GC

## 2022-10-13 PROCEDURE — 93306 TTE W/DOPPLER COMPLETE: CPT | Mod: 26

## 2022-10-13 PROCEDURE — 99233 SBSQ HOSP IP/OBS HIGH 50: CPT

## 2022-10-13 RX ORDER — MORPHINE SULFATE 50 MG/1
2 CAPSULE, EXTENDED RELEASE ORAL
Refills: 0 | Status: DISCONTINUED | OUTPATIENT
Start: 2022-10-13 | End: 2022-10-16

## 2022-10-13 RX ORDER — METOPROLOL TARTRATE 50 MG
0 TABLET ORAL
Qty: 0 | Refills: 0 | DISCHARGE

## 2022-10-13 RX ORDER — METOPROLOL TARTRATE 50 MG
50 TABLET ORAL DAILY
Refills: 0 | Status: DISCONTINUED | OUTPATIENT
Start: 2022-10-13 | End: 2022-10-14

## 2022-10-13 RX ORDER — LEVETIRACETAM 250 MG/1
500 TABLET, FILM COATED ORAL
Refills: 0 | Status: DISCONTINUED | OUTPATIENT
Start: 2022-10-13 | End: 2022-10-14

## 2022-10-13 RX ORDER — MORPHINE SULFATE 50 MG/1
4 CAPSULE, EXTENDED RELEASE ORAL EVERY 6 HOURS
Refills: 0 | Status: DISCONTINUED | OUTPATIENT
Start: 2022-10-13 | End: 2022-10-16

## 2022-10-13 RX ORDER — ATORVASTATIN CALCIUM 80 MG/1
0 TABLET, FILM COATED ORAL
Qty: 0 | Refills: 0 | DISCHARGE

## 2022-10-13 RX ORDER — METFORMIN HYDROCHLORIDE 850 MG/1
0 TABLET ORAL
Qty: 0 | Refills: 0 | DISCHARGE

## 2022-10-13 RX ORDER — INSULIN NPH HUM/REG INSULIN HM 70-30/ML
30 VIAL (ML) SUBCUTANEOUS
Qty: 0 | Refills: 0 | DISCHARGE

## 2022-10-13 RX ORDER — TICAGRELOR 90 MG/1
90 TABLET ORAL EVERY 12 HOURS
Refills: 0 | Status: DISCONTINUED | OUTPATIENT
Start: 2022-10-13 | End: 2022-10-14

## 2022-10-13 RX ORDER — FOLIC ACID 0.8 MG
1 TABLET ORAL DAILY
Refills: 0 | Status: DISCONTINUED | OUTPATIENT
Start: 2022-10-13 | End: 2022-10-16

## 2022-10-13 RX ORDER — INSULIN LISPRO 100/ML
2 VIAL (ML) SUBCUTANEOUS
Refills: 0 | Status: DISCONTINUED | OUTPATIENT
Start: 2022-10-13 | End: 2022-10-16

## 2022-10-13 RX ORDER — ASPIRIN/CALCIUM CARB/MAGNESIUM 324 MG
0 TABLET ORAL
Qty: 0 | Refills: 0 | DISCHARGE

## 2022-10-13 RX ORDER — ASPIRIN/CALCIUM CARB/MAGNESIUM 324 MG
81 TABLET ORAL DAILY
Refills: 0 | Status: DISCONTINUED | OUTPATIENT
Start: 2022-10-13 | End: 2022-10-14

## 2022-10-13 RX ORDER — METOPROLOL TARTRATE 50 MG
50 TABLET ORAL DAILY
Refills: 0 | Status: DISCONTINUED | OUTPATIENT
Start: 2022-10-13 | End: 2022-10-13

## 2022-10-13 RX ORDER — LEVETIRACETAM 250 MG/1
0 TABLET, FILM COATED ORAL
Qty: 0 | Refills: 0 | DISCHARGE

## 2022-10-13 RX ORDER — ATORVASTATIN CALCIUM 80 MG/1
80 TABLET, FILM COATED ORAL AT BEDTIME
Refills: 0 | Status: DISCONTINUED | OUTPATIENT
Start: 2022-10-13 | End: 2022-10-14

## 2022-10-13 RX ORDER — SACUBITRIL AND VALSARTAN 24; 26 MG/1; MG/1
1 TABLET, FILM COATED ORAL
Refills: 0 | Status: DISCONTINUED | OUTPATIENT
Start: 2022-10-13 | End: 2022-10-14

## 2022-10-13 RX ORDER — INFLUENZA VIRUS VACCINE 15; 15; 15; 15 UG/.5ML; UG/.5ML; UG/.5ML; UG/.5ML
0.5 SUSPENSION INTRAMUSCULAR ONCE
Refills: 0 | Status: DISCONTINUED | OUTPATIENT
Start: 2022-10-13 | End: 2022-10-16

## 2022-10-13 RX ORDER — TICAGRELOR 90 MG/1
0 TABLET ORAL
Qty: 0 | Refills: 0 | DISCHARGE

## 2022-10-13 RX ORDER — SACUBITRIL AND VALSARTAN 24; 26 MG/1; MG/1
0 TABLET, FILM COATED ORAL
Qty: 0 | Refills: 0 | DISCHARGE

## 2022-10-13 RX ADMIN — LEVETIRACETAM 500 MILLIGRAM(S): 250 TABLET, FILM COATED ORAL at 06:39

## 2022-10-13 RX ADMIN — Medication 81 MILLIGRAM(S): at 12:41

## 2022-10-13 RX ADMIN — Medication 0: at 07:40

## 2022-10-13 RX ADMIN — Medication 50 MILLIGRAM(S): at 06:39

## 2022-10-13 RX ADMIN — MORPHINE SULFATE 4 MILLIGRAM(S): 50 CAPSULE, EXTENDED RELEASE ORAL at 09:10

## 2022-10-13 RX ADMIN — INSULIN GLARGINE 15 UNIT(S): 100 INJECTION, SOLUTION SUBCUTANEOUS at 21:35

## 2022-10-13 RX ADMIN — AMPICILLIN SODIUM AND SULBACTAM SODIUM 200 GRAM(S): 250; 125 INJECTION, POWDER, FOR SUSPENSION INTRAMUSCULAR; INTRAVENOUS at 17:51

## 2022-10-13 RX ADMIN — Medication 2: at 12:40

## 2022-10-13 RX ADMIN — AMPICILLIN SODIUM AND SULBACTAM SODIUM 200 GRAM(S): 250; 125 INJECTION, POWDER, FOR SUSPENSION INTRAMUSCULAR; INTRAVENOUS at 06:40

## 2022-10-13 RX ADMIN — TICAGRELOR 90 MILLIGRAM(S): 90 TABLET ORAL at 07:15

## 2022-10-13 RX ADMIN — Medication 2 UNIT(S): at 12:39

## 2022-10-13 RX ADMIN — Medication 1: at 21:32

## 2022-10-13 RX ADMIN — SACUBITRIL AND VALSARTAN 1 TABLET(S): 24; 26 TABLET, FILM COATED ORAL at 17:51

## 2022-10-13 RX ADMIN — ATORVASTATIN CALCIUM 80 MILLIGRAM(S): 80 TABLET, FILM COATED ORAL at 21:33

## 2022-10-13 RX ADMIN — AMPICILLIN SODIUM AND SULBACTAM SODIUM 200 GRAM(S): 250; 125 INJECTION, POWDER, FOR SUSPENSION INTRAMUSCULAR; INTRAVENOUS at 12:41

## 2022-10-13 RX ADMIN — MORPHINE SULFATE 2 MILLIGRAM(S): 50 CAPSULE, EXTENDED RELEASE ORAL at 03:48

## 2022-10-13 RX ADMIN — Medication 2 UNIT(S): at 07:41

## 2022-10-13 RX ADMIN — MORPHINE SULFATE 2 MILLIGRAM(S): 50 CAPSULE, EXTENDED RELEASE ORAL at 07:40

## 2022-10-13 RX ADMIN — SACUBITRIL AND VALSARTAN 1 TABLET(S): 24; 26 TABLET, FILM COATED ORAL at 06:39

## 2022-10-13 RX ADMIN — Medication 1 MILLIGRAM(S): at 12:41

## 2022-10-13 RX ADMIN — Medication 1 TABLET(S): at 12:40

## 2022-10-13 RX ADMIN — MORPHINE SULFATE 2 MILLIGRAM(S): 50 CAPSULE, EXTENDED RELEASE ORAL at 02:27

## 2022-10-13 RX ADMIN — MORPHINE SULFATE 4 MILLIGRAM(S): 50 CAPSULE, EXTENDED RELEASE ORAL at 09:40

## 2022-10-13 RX ADMIN — TICAGRELOR 90 MILLIGRAM(S): 90 TABLET ORAL at 17:18

## 2022-10-13 RX ADMIN — LEVETIRACETAM 500 MILLIGRAM(S): 250 TABLET, FILM COATED ORAL at 17:17

## 2022-10-13 RX ADMIN — Medication 2 UNIT(S): at 17:16

## 2022-10-13 RX ADMIN — Medication 4: at 17:16

## 2022-10-13 NOTE — CONSULT NOTE ADULT - ASSESSMENT
The patient is a 58 yo male with PMH of HTN, Diabetes mellitus Type 2, CAD, EtOH overuse, CAD s/p PCI s/p for STEMI in February 2022 ( PCI of proximal Cx for treatment of inferolateral STEMI). Cardiology was consulted for a pre-operative evaluation prior to RUE tendon repair by plastic surgery.     Recommendations:     - The patient is currently without any symptoms of ACS or decompensated heart failure, and appears euvolemic on exam?- Coyne score of 1.6 % for Risk of myocardial infarction or cardiac arrest, intraoperatively or up to 30 days post-op?- RCRI 3 points, Class IV Risk, 15.0 %, 30-day risk of death, MI, or cardiac arrest?- ACS NSQIP 25.2% risk of serious complication (average risk for the patient's group is 19.4%), 6.3% risk of pneumonia, 6.1% risk of cardiac complication, 4.8% risk of death?- At this time, there is no indication for further cardiac assessment for the patient's urgent vascular procedure and the patient appears to be medically optimized ?- If the patient and vascular surgery are understanding and agreeable with the above risks, they may proceed with the urgent vascular intervention      Per 2014 ACC/AHA guidelines, consider the following recommendations:  - consider holding diuretics prior to surgery  - it is reasonable to continue ACE inhibitors / ARBs  - hold oral hypoglycemic agents on the day of surgery  - dose reduce basal insulin by 20%; hold prandial insulin correction  - continue statins in patients who are taking them  - perioperative initiation of statin use is reasonable in patients undergoing vascular surgery with cardiovascular risk  - continue ASA in those with coronary stents  - continue beta-blockers if patient is already taking them  - in patients with >=3 RCRI factors, it may be reasonable to begin beta-blockers 2-4 weeks before surgery, but beta-blockers should not be started on the day of surgery  - if patient has received more than two weeks of corticosteroids in the year leading up to surgery, consider stress dose steroids (IV hydrocortisone 50mg q6h)      Rosaura Yen MD   Cardiology Fellow     This consult note is preliminary until cosigned by the attending cardiologist. The patient is a 58 yo male with PMH of HTN, Diabetes mellitus Type 2, CAD, EtOH overuse, CAD s/p PCI s/p for STEMI in February 2022 ( PCI of proximal Cx for treatment of inferolateral STEMI). Cardiology was consulted for a pre-operative evaluation prior to RUE tendon repair by plastic surgery.     Recommendations:   - The patient is currently without any symptoms of ACS or decompensated heart failure, and appears euvolemic on exam  - Coyne score of 0.1 % for Risk of myocardial infarction or cardiac arrest, intraoperatively or up to 30 days post-op  - RCRI 3 points, Class IV Risk, 15.0 %, 30-day risk of death, MI, or cardiac arrest  - Echo March 2022:  Severe segmental left ventricular systolic dysfunction. No LV thrombus.  - TTE from this admission pending   - At this time, there is no indication for further cardiac assessment for the patient's  procedure and the patient appears to be medically optimized  - If the patient and plastic surgery are understanding and agreeable with the above risks, they may proceed with the intervention      Rosaura Yen MD   Cardiology Fellow     This consult note is preliminary until cosigned by the attending cardiologist. The patient is a 58 yo male with PMH of HTN, Diabetes mellitus Type 2, CAD, EtOH overuse, CAD s/p PCI s/p for STEMI in February 2022 ( PCI of proximal Cx for treatment of inferolateral STEMI). Cardiology was consulted for a pre-operative evaluation prior to RUE tendon repair by plastic surgery.     Recommendations:   - The patient is currently without any symptoms of ACS or decompensated heart failure, and appears euvolemic on exam  - Coyne score of 0.1 % for Risk of myocardial infarction or cardiac arrest, intraoperatively or up to 30 days post-op  - RCRI 3 points, Class IV Risk, 15.0 %, 30-day risk of death, MI, or cardiac arrest  - Echo March 2022:  Severe segmental left ventricular systolic dysfunction. No LV thrombus.  - TTE from this admission pending   - At this time, there is no indication for further cardiac assessment for the patient's  procedure and the patient appears to be medically optimized  - If the patient and plastic surgery are understanding and agreeable with the above risks, they may proceed with the intervention    Rosaura Yen MD   Cardiology Fellow     This consult note is preliminary until cosigned by the attending cardiologist.      Rosaura Yen MD   Cardiology Fellow     This consult note is preliminary until cosigned by the attending cardiologist.

## 2022-10-13 NOTE — CONSULT NOTE ADULT - SUBJECTIVE AND OBJECTIVE BOX
CARDIOLOGY FELLOW CONSULT NOTE    HPI: The patient is a 58 yo male with PMH of HTN, Diabetes mellitus Type 2, CAD, EtOH overuse, CAD s/p PCI s/p for STEMI in 2022 ( PCI of proximal Cx for treatment of inferolateral STEMI). The pt has since been on Brilinta and Aspirin and denies any ocurrence of chest pain, dyspnea, palpitations, light-headedness or syncope. He states that he has been physically active, plays football and is able to walk, climb stairs and exert himself without any limitations. Troponin 20 during this admission with EKG changes not concerning for ischemia. Echo pending.       PMHx:   Diabetes    HTN (hypertension)    EtOH dependence    Transient cerebral ischemic attack    CAD (coronary artery disease)    Prophylactic measure        PSHx:   No significant past surgical history    Status post placement of implantable loop recorder        Allergies:  No Known Allergies      Home Meds:    Current Medications:   acetaminophen     Tablet .. 650 milliGRAM(s) Oral every 6 hours PRN  aluminum hydroxide/magnesium hydroxide/simethicone Suspension 30 milliLiter(s) Oral every 4 hours PRN  ampicillin/sulbactam  IVPB      ampicillin/sulbactam  IVPB 3 Gram(s) IV Intermittent every 6 hours  aspirin enteric coated 81 milliGRAM(s) Oral daily  atorvastatin 80 milliGRAM(s) Oral at bedtime  dextrose 5%. 1000 milliLiter(s) IV Continuous <Continuous>  dextrose 5%. 1000 milliLiter(s) IV Continuous <Continuous>  dextrose 50% Injectable 25 Gram(s) IV Push once  dextrose 50% Injectable 12.5 Gram(s) IV Push once  dextrose 50% Injectable 25 Gram(s) IV Push once  dextrose Oral Gel 15 Gram(s) Oral once PRN  folic acid 1 milliGRAM(s) Oral daily  glucagon  Injectable 1 milliGRAM(s) IntraMuscular once  influenza   Vaccine 0.5 milliLiter(s) IntraMuscular once  insulin glargine Injectable (LANTUS) 15 Unit(s) SubCutaneous at bedtime  insulin lispro (ADMELOG) corrective regimen sliding scale   SubCutaneous three times a day before meals  insulin lispro (ADMELOG) corrective regimen sliding scale   SubCutaneous at bedtime  insulin lispro Injectable (ADMELOG) 2 Unit(s) SubCutaneous three times a day before meals  levETIRAcetam 500 milliGRAM(s) Oral two times a day  melatonin 3 milliGRAM(s) Oral at bedtime PRN  metoprolol succinate ER 50 milliGRAM(s) Oral daily  morphine  - Injectable 2 milliGRAM(s) IV Push every 3 hours PRN  morphine  - Injectable 4 milliGRAM(s) IV Push every 6 hours PRN  multivitamin 1 Tablet(s) Oral daily  ondansetron Injectable 4 milliGRAM(s) IV Push every 8 hours PRN  sacubitril 24 mG/valsartan 26 mG 1 Tablet(s) Oral two times a day  ticagrelor 90 milliGRAM(s) Oral every 12 hours      FAMILY HISTORY:  FH: stroke (Mother)    FH: diabetes mellitus (Mother)        REVIEW OF SYSTEMS:  CONSTITUTIONAL: No weakness, fevers or chills  EYES/ENT: No visual changes;  No dysphagia  NECK: No pain or stiffness  RESPIRATORY: No cough, wheezing, hemoptysis; No shortness of breath  CARDIOVASCULAR: No chest pain or palpitations; No lower extremity edema  GASTROINTESTINAL: No abdominal or epigastric pain. No nausea, vomiting, or hematemesis; No diarrhea or constipation. No melena or hematochezia.  BACK: No back pain  GENITOURINARY: No dysuria, frequency or hematuria  NEUROLOGICAL: No numbness or weakness  SKIN: No itching, burning, rashes, or lesions   All other review of systems is negative unless indicated above.    Physical Exam:  T(F): 98 (10-13), Max: 98.2 (10-12)  HR: 87 (10-13) (62 - 87)  BP: 122/91 (10-13) (122/91 - 151/88)  RR: 18 (10-13)  SpO2: 96% (10-13)  GENERAL: No acute distress, well-developed  HEAD:  Atraumatic, Normocephalic  ENT: conjunctiva and sclera clear, Neck supple, No JVD, moist mucosa  CHEST/LUNG: Clear to auscultation bilaterally; No wheeze, equal breath sounds bilaterally   HEART: Regular rate and rhythm; No murmurs, rubs, or gallops  ABDOMEN: Soft, Nontender, Nondistended; Bowel sounds present  EXTREMITIES:  No clubbing, cyanosis, or edema  PSYCH: Nl behavior, nl affect  NEUROLOGY: AAOx3  SKIN: Normal color, No rashes or lesions, cast of the RUE                           14.4   4.69  )-----------( 162      ( 13 Oct 2022 08:18 )             43.2     10-13    136  |  98  |  17  ----------------------------<  88  3.6   |  27  |  0.88    Ca    8.5      13 Oct 2022 08:18    TPro  6.4  /  Alb  3.9  /  TBili  0.9  /  DBili  x   /  AST  42<H>  /  ALT  43<H>  /  AlkPhos  53  10-13    PT/INR - ( 12 Oct 2022 16:05 )   PT: 11.7 sec;   INR: 1.01 ratio         PTT - ( 12 Oct 2022 16:05 )  PTT:27.0 sec    CARDIAC MARKERS ( 12 Oct 2022 16:05 )  20 ng/L / x     / x     / x     / x     / x              Total Cholesterol: 188  LDL: --  HDL: 94  T

## 2022-10-13 NOTE — CONSULT NOTE ADULT - ATTENDING COMMENTS
Echo with mild segmental LV dysfunction.   No signs of HF.    There are no active cardiac conditions. There is no evidence of ACS, heart failure, or obstructive valvular heart disease. The patient may safely proceed with the planned procedure, including the use of sedation or GET as clinically indicated.  No further cardiac testing is required.

## 2022-10-13 NOTE — PATIENT PROFILE ADULT - BRAND OF FIRST COVID-19 BOOSTER
Lantus 10 units AM, 10 units PM SC  Lispro 8 units TID before meals  Sliding scale with coverage  CHO diet. Moderna

## 2022-10-13 NOTE — CHART NOTE - NSCHARTNOTEFT_GEN_A_CORE
Echo Ordered  Cardiology Consult called for Cardiac Clearance Echo Ordered and expedited   Cardiology Consult called for Cardiac Clearance

## 2022-10-13 NOTE — CONSULT NOTE ADULT - SUBJECTIVE AND OBJECTIVE BOX
Appreciate all efforts.  Discussed w pt and medical team.  Plan: Splint for now/IV abx, Unasyn, Q6hr/NPO after midnight tonight for repairing of RUE injuries, pending cardiac work up and clearance by medical team and cardiology.

## 2022-10-14 ENCOUNTER — RESULT REVIEW (OUTPATIENT)
Age: 57
End: 2022-10-14

## 2022-10-14 LAB
ANION GAP SERPL CALC-SCNC: 10 MMOL/L — SIGNIFICANT CHANGE UP (ref 7–14)
APTT BLD: 30.4 SEC — SIGNIFICANT CHANGE UP (ref 27–36.3)
BASOPHILS # BLD AUTO: 0.03 K/UL — SIGNIFICANT CHANGE UP (ref 0–0.2)
BASOPHILS NFR BLD AUTO: 0.6 % — SIGNIFICANT CHANGE UP (ref 0–2)
BUN SERPL-MCNC: 13 MG/DL — SIGNIFICANT CHANGE UP (ref 7–23)
CALCIUM SERPL-MCNC: 9.1 MG/DL — SIGNIFICANT CHANGE UP (ref 8.4–10.5)
CHLORIDE SERPL-SCNC: 102 MMOL/L — SIGNIFICANT CHANGE UP (ref 98–107)
CO2 SERPL-SCNC: 23 MMOL/L — SIGNIFICANT CHANGE UP (ref 22–31)
CREAT SERPL-MCNC: 0.86 MG/DL — SIGNIFICANT CHANGE UP (ref 0.5–1.3)
EGFR: 102 ML/MIN/1.73M2 — SIGNIFICANT CHANGE UP
EOSINOPHIL # BLD AUTO: 0.18 K/UL — SIGNIFICANT CHANGE UP (ref 0–0.5)
EOSINOPHIL NFR BLD AUTO: 3.5 % — SIGNIFICANT CHANGE UP (ref 0–6)
GLUCOSE BLDC GLUCOMTR-MCNC: 101 MG/DL — HIGH (ref 70–99)
GLUCOSE BLDC GLUCOMTR-MCNC: 195 MG/DL — HIGH (ref 70–99)
GLUCOSE BLDC GLUCOMTR-MCNC: 469 MG/DL — CRITICAL HIGH (ref 70–99)
GLUCOSE BLDC GLUCOMTR-MCNC: 483 MG/DL — CRITICAL HIGH (ref 70–99)
GLUCOSE BLDC GLUCOMTR-MCNC: 75 MG/DL — SIGNIFICANT CHANGE UP (ref 70–99)
GLUCOSE BLDC GLUCOMTR-MCNC: 92 MG/DL — SIGNIFICANT CHANGE UP (ref 70–99)
GLUCOSE SERPL-MCNC: 78 MG/DL — SIGNIFICANT CHANGE UP (ref 70–99)
HCT VFR BLD CALC: 45 % — SIGNIFICANT CHANGE UP (ref 39–50)
HGB BLD-MCNC: 15.5 G/DL — SIGNIFICANT CHANGE UP (ref 13–17)
IANC: 3.22 K/UL — SIGNIFICANT CHANGE UP (ref 1.8–7.4)
IMM GRANULOCYTES NFR BLD AUTO: 0.4 % — SIGNIFICANT CHANGE UP (ref 0–0.9)
INR BLD: 1 RATIO — SIGNIFICANT CHANGE UP (ref 0.88–1.16)
LYMPHOCYTES # BLD AUTO: 1.23 K/UL — SIGNIFICANT CHANGE UP (ref 1–3.3)
LYMPHOCYTES # BLD AUTO: 23.7 % — SIGNIFICANT CHANGE UP (ref 13–44)
MAGNESIUM SERPL-MCNC: 1.9 MG/DL — SIGNIFICANT CHANGE UP (ref 1.6–2.6)
MCHC RBC-ENTMCNC: 30.6 PG — SIGNIFICANT CHANGE UP (ref 27–34)
MCHC RBC-ENTMCNC: 34.4 GM/DL — SIGNIFICANT CHANGE UP (ref 32–36)
MCV RBC AUTO: 88.8 FL — SIGNIFICANT CHANGE UP (ref 80–100)
MONOCYTES # BLD AUTO: 0.52 K/UL — SIGNIFICANT CHANGE UP (ref 0–0.9)
MONOCYTES NFR BLD AUTO: 10 % — SIGNIFICANT CHANGE UP (ref 2–14)
NEUTROPHILS # BLD AUTO: 3.22 K/UL — SIGNIFICANT CHANGE UP (ref 1.8–7.4)
NEUTROPHILS NFR BLD AUTO: 61.8 % — SIGNIFICANT CHANGE UP (ref 43–77)
NRBC # BLD: 0 /100 WBCS — SIGNIFICANT CHANGE UP (ref 0–0)
NRBC # FLD: 0 K/UL — SIGNIFICANT CHANGE UP (ref 0–0)
PHOSPHATE SERPL-MCNC: 3.4 MG/DL — SIGNIFICANT CHANGE UP (ref 2.5–4.5)
PLATELET # BLD AUTO: 157 K/UL — SIGNIFICANT CHANGE UP (ref 150–400)
POTASSIUM SERPL-MCNC: 3.9 MMOL/L — SIGNIFICANT CHANGE UP (ref 3.5–5.3)
POTASSIUM SERPL-SCNC: 3.9 MMOL/L — SIGNIFICANT CHANGE UP (ref 3.5–5.3)
PROTHROM AB SERPL-ACNC: 11.6 SEC — SIGNIFICANT CHANGE UP (ref 10.5–13.4)
RBC # BLD: 5.07 M/UL — SIGNIFICANT CHANGE UP (ref 4.2–5.8)
RBC # FLD: 12.9 % — SIGNIFICANT CHANGE UP (ref 10.3–14.5)
SODIUM SERPL-SCNC: 135 MMOL/L — SIGNIFICANT CHANGE UP (ref 135–145)
WBC # BLD: 5.2 K/UL — SIGNIFICANT CHANGE UP (ref 3.8–10.5)
WBC # FLD AUTO: 5.2 K/UL — SIGNIFICANT CHANGE UP (ref 3.8–10.5)

## 2022-10-14 PROCEDURE — 88304 TISSUE EXAM BY PATHOLOGIST: CPT | Mod: 26

## 2022-10-14 RX ORDER — METOPROLOL TARTRATE 50 MG
50 TABLET ORAL DAILY
Refills: 0 | Status: DISCONTINUED | OUTPATIENT
Start: 2022-10-14 | End: 2022-10-16

## 2022-10-14 RX ORDER — SACUBITRIL AND VALSARTAN 24; 26 MG/1; MG/1
1 TABLET, FILM COATED ORAL
Refills: 0 | Status: DISCONTINUED | OUTPATIENT
Start: 2022-10-14 | End: 2022-10-16

## 2022-10-14 RX ORDER — ASPIRIN/CALCIUM CARB/MAGNESIUM 324 MG
81 TABLET ORAL DAILY
Refills: 0 | Status: DISCONTINUED | OUTPATIENT
Start: 2022-10-14 | End: 2022-10-16

## 2022-10-14 RX ORDER — LEVETIRACETAM 250 MG/1
500 TABLET, FILM COATED ORAL
Refills: 0 | Status: DISCONTINUED | OUTPATIENT
Start: 2022-10-14 | End: 2022-10-16

## 2022-10-14 RX ORDER — TICAGRELOR 90 MG/1
90 TABLET ORAL EVERY 12 HOURS
Refills: 0 | Status: DISCONTINUED | OUTPATIENT
Start: 2022-10-14 | End: 2022-10-16

## 2022-10-14 RX ORDER — ATORVASTATIN CALCIUM 80 MG/1
80 TABLET, FILM COATED ORAL AT BEDTIME
Refills: 0 | Status: DISCONTINUED | OUTPATIENT
Start: 2022-10-14 | End: 2022-10-16

## 2022-10-14 RX ORDER — OXYCODONE AND ACETAMINOPHEN 5; 325 MG/1; MG/1
1 TABLET ORAL EVERY 4 HOURS
Refills: 0 | Status: DISCONTINUED | OUTPATIENT
Start: 2022-10-14 | End: 2022-10-16

## 2022-10-14 RX ORDER — AMPICILLIN SODIUM AND SULBACTAM SODIUM 250; 125 MG/ML; MG/ML
3 INJECTION, POWDER, FOR SUSPENSION INTRAMUSCULAR; INTRAVENOUS EVERY 6 HOURS
Refills: 0 | Status: DISCONTINUED | OUTPATIENT
Start: 2022-10-14 | End: 2022-10-16

## 2022-10-14 RX ADMIN — Medication 4: at 23:05

## 2022-10-14 RX ADMIN — ATORVASTATIN CALCIUM 80 MILLIGRAM(S): 80 TABLET, FILM COATED ORAL at 22:14

## 2022-10-14 RX ADMIN — Medication 1: at 17:29

## 2022-10-14 RX ADMIN — LEVETIRACETAM 500 MILLIGRAM(S): 250 TABLET, FILM COATED ORAL at 05:15

## 2022-10-14 RX ADMIN — AMPICILLIN SODIUM AND SULBACTAM SODIUM 200 GRAM(S): 250; 125 INJECTION, POWDER, FOR SUSPENSION INTRAMUSCULAR; INTRAVENOUS at 17:31

## 2022-10-14 RX ADMIN — TICAGRELOR 90 MILLIGRAM(S): 90 TABLET ORAL at 17:31

## 2022-10-14 RX ADMIN — Medication 50 MILLIGRAM(S): at 17:44

## 2022-10-14 RX ADMIN — INSULIN GLARGINE 15 UNIT(S): 100 INJECTION, SOLUTION SUBCUTANEOUS at 23:06

## 2022-10-14 RX ADMIN — SACUBITRIL AND VALSARTAN 1 TABLET(S): 24; 26 TABLET, FILM COATED ORAL at 16:24

## 2022-10-14 RX ADMIN — TICAGRELOR 90 MILLIGRAM(S): 90 TABLET ORAL at 05:14

## 2022-10-14 RX ADMIN — AMPICILLIN SODIUM AND SULBACTAM SODIUM 200 GRAM(S): 250; 125 INJECTION, POWDER, FOR SUSPENSION INTRAMUSCULAR; INTRAVENOUS at 01:15

## 2022-10-14 RX ADMIN — AMPICILLIN SODIUM AND SULBACTAM SODIUM 200 GRAM(S): 250; 125 INJECTION, POWDER, FOR SUSPENSION INTRAMUSCULAR; INTRAVENOUS at 05:14

## 2022-10-14 RX ADMIN — Medication 50 MILLIGRAM(S): at 05:15

## 2022-10-14 RX ADMIN — Medication 2 UNIT(S): at 17:28

## 2022-10-14 RX ADMIN — SACUBITRIL AND VALSARTAN 1 TABLET(S): 24; 26 TABLET, FILM COATED ORAL at 05:15

## 2022-10-14 RX ADMIN — LEVETIRACETAM 500 MILLIGRAM(S): 250 TABLET, FILM COATED ORAL at 16:25

## 2022-10-14 NOTE — CHART NOTE - NSCHARTNOTEFT_GEN_A_CORE
Patient was off the floor today and was in the OR. He was therefore not seen by me, however chart reviewed.    Continue current care, as per surgery team while in the OR. Will follow up upon completion of surgery.

## 2022-10-14 NOTE — PRE-OP CHECKLIST - ADVANCE DIRECTIVE ADDRESSED/READDRESSED
PA not needed for Jabari, pharmacy received paid claim.      EPA exiting encounter, thank you.  
Pharmacy calling  for prior authorization on:    Medication: ajovy   Dosage: 225 mg   Quantity requested:  1 pen w/ 11 refills   Pharmacy for prescription has been selected.    Prior authorization request has been initiated and sent for completion.    
done

## 2022-10-15 ENCOUNTER — TRANSCRIPTION ENCOUNTER (OUTPATIENT)
Age: 57
End: 2022-10-15

## 2022-10-15 LAB
ALBUMIN SERPL ELPH-MCNC: 4.1 G/DL — SIGNIFICANT CHANGE UP (ref 3.3–5)
ALP SERPL-CCNC: 67 U/L — SIGNIFICANT CHANGE UP (ref 40–120)
ALT FLD-CCNC: 34 U/L — SIGNIFICANT CHANGE UP (ref 4–41)
ANION GAP SERPL CALC-SCNC: 13 MMOL/L — SIGNIFICANT CHANGE UP (ref 7–14)
ANION GAP SERPL CALC-SCNC: 15 MMOL/L — HIGH (ref 7–14)
APPEARANCE UR: CLEAR — SIGNIFICANT CHANGE UP
AST SERPL-CCNC: 25 U/L — SIGNIFICANT CHANGE UP (ref 4–40)
B-OH-BUTYR SERPL-SCNC: 0.4 MMOL/L — SIGNIFICANT CHANGE UP (ref 0–0.4)
BASE EXCESS BLDV CALC-SCNC: -1.1 MMOL/L — SIGNIFICANT CHANGE UP (ref -2–3)
BASOPHILS # BLD AUTO: 0.03 K/UL — SIGNIFICANT CHANGE UP (ref 0–0.2)
BASOPHILS NFR BLD AUTO: 0.4 % — SIGNIFICANT CHANGE UP (ref 0–2)
BILIRUB SERPL-MCNC: 0.6 MG/DL — SIGNIFICANT CHANGE UP (ref 0.2–1.2)
BILIRUB UR-MCNC: NEGATIVE — SIGNIFICANT CHANGE UP
BUN SERPL-MCNC: 22 MG/DL — SIGNIFICANT CHANGE UP (ref 7–23)
BUN SERPL-MCNC: 26 MG/DL — HIGH (ref 7–23)
CALCIUM SERPL-MCNC: 8.9 MG/DL — SIGNIFICANT CHANGE UP (ref 8.4–10.5)
CALCIUM SERPL-MCNC: 9.1 MG/DL — SIGNIFICANT CHANGE UP (ref 8.4–10.5)
CHLORIDE SERPL-SCNC: 92 MMOL/L — LOW (ref 98–107)
CHLORIDE SERPL-SCNC: 98 MMOL/L — SIGNIFICANT CHANGE UP (ref 98–107)
CO2 BLDV-SCNC: 24.7 MMOL/L — SIGNIFICANT CHANGE UP (ref 22–26)
CO2 SERPL-SCNC: 20 MMOL/L — LOW (ref 22–31)
CO2 SERPL-SCNC: 23 MMOL/L — SIGNIFICANT CHANGE UP (ref 22–31)
COLOR SPEC: SIGNIFICANT CHANGE UP
CREAT SERPL-MCNC: 1.04 MG/DL — SIGNIFICANT CHANGE UP (ref 0.5–1.3)
CREAT SERPL-MCNC: 1.13 MG/DL — SIGNIFICANT CHANGE UP (ref 0.5–1.3)
DIFF PNL FLD: NEGATIVE — SIGNIFICANT CHANGE UP
EGFR: 77 ML/MIN/1.73M2 — SIGNIFICANT CHANGE UP
EGFR: 85 ML/MIN/1.73M2 — SIGNIFICANT CHANGE UP
EOSINOPHIL # BLD AUTO: 0.05 K/UL — SIGNIFICANT CHANGE UP (ref 0–0.5)
EOSINOPHIL NFR BLD AUTO: 0.7 % — SIGNIFICANT CHANGE UP (ref 0–6)
GLUCOSE BLDC GLUCOMTR-MCNC: 217 MG/DL — HIGH (ref 70–99)
GLUCOSE BLDC GLUCOMTR-MCNC: 234 MG/DL — HIGH (ref 70–99)
GLUCOSE BLDC GLUCOMTR-MCNC: 292 MG/DL — HIGH (ref 70–99)
GLUCOSE BLDC GLUCOMTR-MCNC: 321 MG/DL — HIGH (ref 70–99)
GLUCOSE BLDC GLUCOMTR-MCNC: 322 MG/DL — HIGH (ref 70–99)
GLUCOSE SERPL-MCNC: 249 MG/DL — HIGH (ref 70–99)
GLUCOSE SERPL-MCNC: 374 MG/DL — HIGH (ref 70–99)
GLUCOSE UR QL: ABNORMAL
HCO3 BLDV-SCNC: 24 MMOL/L — SIGNIFICANT CHANGE UP (ref 22–29)
HCT VFR BLD CALC: 46.5 % — SIGNIFICANT CHANGE UP (ref 39–50)
HGB BLD-MCNC: 16 G/DL — SIGNIFICANT CHANGE UP (ref 13–17)
IANC: 5.1 K/UL — SIGNIFICANT CHANGE UP (ref 1.8–7.4)
IMM GRANULOCYTES NFR BLD AUTO: 0.6 % — SIGNIFICANT CHANGE UP (ref 0–0.9)
KETONES UR-MCNC: ABNORMAL
LEUKOCYTE ESTERASE UR-ACNC: NEGATIVE — SIGNIFICANT CHANGE UP
LYMPHOCYTES # BLD AUTO: 1.2 K/UL — SIGNIFICANT CHANGE UP (ref 1–3.3)
LYMPHOCYTES # BLD AUTO: 17.1 % — SIGNIFICANT CHANGE UP (ref 13–44)
MAGNESIUM SERPL-MCNC: 1.9 MG/DL — SIGNIFICANT CHANGE UP (ref 1.6–2.6)
MCHC RBC-ENTMCNC: 30.9 PG — SIGNIFICANT CHANGE UP (ref 27–34)
MCHC RBC-ENTMCNC: 34.4 GM/DL — SIGNIFICANT CHANGE UP (ref 32–36)
MCV RBC AUTO: 89.8 FL — SIGNIFICANT CHANGE UP (ref 80–100)
MONOCYTES # BLD AUTO: 0.58 K/UL — SIGNIFICANT CHANGE UP (ref 0–0.9)
MONOCYTES NFR BLD AUTO: 8.3 % — SIGNIFICANT CHANGE UP (ref 2–14)
NEUTROPHILS # BLD AUTO: 5.1 K/UL — SIGNIFICANT CHANGE UP (ref 1.8–7.4)
NEUTROPHILS NFR BLD AUTO: 72.9 % — SIGNIFICANT CHANGE UP (ref 43–77)
NITRITE UR-MCNC: NEGATIVE — SIGNIFICANT CHANGE UP
NRBC # BLD: 0 /100 WBCS — SIGNIFICANT CHANGE UP (ref 0–0)
NRBC # FLD: 0 K/UL — SIGNIFICANT CHANGE UP (ref 0–0)
PCO2 BLDV: 38 MMHG — LOW (ref 42–55)
PH BLDV: 7.4 — SIGNIFICANT CHANGE UP (ref 7.32–7.43)
PH UR: 5.5 — SIGNIFICANT CHANGE UP (ref 5–8)
PHOSPHATE SERPL-MCNC: 3.7 MG/DL — SIGNIFICANT CHANGE UP (ref 2.5–4.5)
PLATELET # BLD AUTO: 151 K/UL — SIGNIFICANT CHANGE UP (ref 150–400)
PO2 BLDV: 53 MMHG — SIGNIFICANT CHANGE UP
POTASSIUM SERPL-MCNC: 4.6 MMOL/L — SIGNIFICANT CHANGE UP (ref 3.5–5.3)
POTASSIUM SERPL-MCNC: 5 MMOL/L — SIGNIFICANT CHANGE UP (ref 3.5–5.3)
POTASSIUM SERPL-SCNC: 4.6 MMOL/L — SIGNIFICANT CHANGE UP (ref 3.5–5.3)
POTASSIUM SERPL-SCNC: 5 MMOL/L — SIGNIFICANT CHANGE UP (ref 3.5–5.3)
PROT SERPL-MCNC: 7.2 G/DL — SIGNIFICANT CHANGE UP (ref 6–8.3)
PROT UR-MCNC: NEGATIVE — SIGNIFICANT CHANGE UP
RBC # BLD: 5.18 M/UL — SIGNIFICANT CHANGE UP (ref 4.2–5.8)
RBC # FLD: 12.6 % — SIGNIFICANT CHANGE UP (ref 10.3–14.5)
SAO2 % BLDV: 86.5 % — SIGNIFICANT CHANGE UP
SODIUM SERPL-SCNC: 127 MMOL/L — LOW (ref 135–145)
SODIUM SERPL-SCNC: 134 MMOL/L — LOW (ref 135–145)
SP GR SPEC: 1.02 — SIGNIFICANT CHANGE UP (ref 1.01–1.05)
UROBILINOGEN FLD QL: SIGNIFICANT CHANGE UP
WBC # BLD: 7 K/UL — SIGNIFICANT CHANGE UP (ref 3.8–10.5)
WBC # FLD AUTO: 7 K/UL — SIGNIFICANT CHANGE UP (ref 3.8–10.5)

## 2022-10-15 PROCEDURE — 99232 SBSQ HOSP IP/OBS MODERATE 35: CPT

## 2022-10-15 RX ADMIN — Medication 650 MILLIGRAM(S): at 18:25

## 2022-10-15 RX ADMIN — AMPICILLIN SODIUM AND SULBACTAM SODIUM 200 GRAM(S): 250; 125 INJECTION, POWDER, FOR SUSPENSION INTRAMUSCULAR; INTRAVENOUS at 11:48

## 2022-10-15 RX ADMIN — TICAGRELOR 90 MILLIGRAM(S): 90 TABLET ORAL at 18:24

## 2022-10-15 RX ADMIN — TICAGRELOR 90 MILLIGRAM(S): 90 TABLET ORAL at 05:55

## 2022-10-15 RX ADMIN — AMPICILLIN SODIUM AND SULBACTAM SODIUM 200 GRAM(S): 250; 125 INJECTION, POWDER, FOR SUSPENSION INTRAMUSCULAR; INTRAVENOUS at 00:49

## 2022-10-15 RX ADMIN — Medication 81 MILLIGRAM(S): at 13:27

## 2022-10-15 RX ADMIN — ATORVASTATIN CALCIUM 80 MILLIGRAM(S): 80 TABLET, FILM COATED ORAL at 22:41

## 2022-10-15 RX ADMIN — Medication 4: at 17:21

## 2022-10-15 RX ADMIN — Medication 2 UNIT(S): at 12:22

## 2022-10-15 RX ADMIN — LEVETIRACETAM 500 MILLIGRAM(S): 250 TABLET, FILM COATED ORAL at 05:55

## 2022-10-15 RX ADMIN — LEVETIRACETAM 500 MILLIGRAM(S): 250 TABLET, FILM COATED ORAL at 17:05

## 2022-10-15 RX ADMIN — AMPICILLIN SODIUM AND SULBACTAM SODIUM 200 GRAM(S): 250; 125 INJECTION, POWDER, FOR SUSPENSION INTRAMUSCULAR; INTRAVENOUS at 05:55

## 2022-10-15 RX ADMIN — Medication 2 UNIT(S): at 07:54

## 2022-10-15 RX ADMIN — Medication 3: at 12:21

## 2022-10-15 RX ADMIN — Medication 650 MILLIGRAM(S): at 17:06

## 2022-10-15 RX ADMIN — Medication 50 MILLIGRAM(S): at 05:58

## 2022-10-15 RX ADMIN — Medication 2 UNIT(S): at 17:22

## 2022-10-15 RX ADMIN — Medication 1 MILLIGRAM(S): at 11:54

## 2022-10-15 RX ADMIN — SACUBITRIL AND VALSARTAN 1 TABLET(S): 24; 26 TABLET, FILM COATED ORAL at 17:03

## 2022-10-15 RX ADMIN — AMPICILLIN SODIUM AND SULBACTAM SODIUM 200 GRAM(S): 250; 125 INJECTION, POWDER, FOR SUSPENSION INTRAMUSCULAR; INTRAVENOUS at 17:03

## 2022-10-15 RX ADMIN — SACUBITRIL AND VALSARTAN 1 TABLET(S): 24; 26 TABLET, FILM COATED ORAL at 05:55

## 2022-10-15 RX ADMIN — Medication 2: at 07:54

## 2022-10-15 RX ADMIN — Medication 1 TABLET(S): at 11:54

## 2022-10-15 RX ADMIN — INSULIN GLARGINE 15 UNIT(S): 100 INJECTION, SOLUTION SUBCUTANEOUS at 22:33

## 2022-10-15 NOTE — DISCHARGE NOTE PROVIDER - PROVIDER TOKENS
PROVIDER:[TOKEN:[[id number]]] PROVIDER:[TOKEN:[[id number]]],PROVIDER:[TOKEN:[[id number]]]

## 2022-10-15 NOTE — DISCHARGE NOTE PROVIDER - NSDCMRMEDTOKEN_GEN_ALL_CORE_FT
AFREZZA 8-12UNIT POW:   aspirin 81 mg oral tablet: 1 tab(s) orally once a day  atorvastatin 80 mg oral tablet: 1 tab(s) orally once a day (at bedtime)  Basaglar KwikPen 100 units/mL subcutaneous solution: 20 unit(s) subcutaneous once a day (at bedtime)  folic acid 1 mg oral tablet: 1 tab(s) orally once a day  levETIRAcetam 500 mg oral tablet: 1 tab(s) orally 2 times a day  metoprolol succinate 50 mg oral tablet, extended release: 1 tab(s) orally once a day  Multiple Vitamins oral tablet: 1 tab(s) orally once a day  sacubitril-valsartan 24 mg-26 mg oral tablet: 1 tab(s) orally 2 times a day    *test coverage; please page [phone] with price; or message on Teams Forrest Ledesma*  ticagrelor 90 mg oral tablet: 1 tab(s) orally every 12 hours    *test coverage; please message Forrest Ledesma on Teams with price or page [phone]*   AFREZZA 8-12UNIT POW:   amoxicillin-clavulanate 875 mg-125 mg oral tablet: 1 tab(s) orally once a day   aspirin 81 mg oral tablet: 1 tab(s) orally once a day  atorvastatin 80 mg oral tablet: 1 tab(s) orally once a day (at bedtime)  Basaglar KwikPen 100 units/mL subcutaneous solution: 20 unit(s) subcutaneous once a day (at bedtime)  folic acid 1 mg oral tablet: 1 tab(s) orally once a day  levETIRAcetam 500 mg oral tablet: 1 tab(s) orally 2 times a day  metoprolol succinate 50 mg oral tablet, extended release: 1 tab(s) orally once a day  Multiple Vitamins oral tablet: 1 tab(s) orally once a day  sacubitril-valsartan 24 mg-26 mg oral tablet: 1 tab(s) orally 2 times a day    *test coverage; please page [phone] with price; or message on Teams Forrest Ledesma*  ticagrelor 90 mg oral tablet: 1 tab(s) orally every 12 hours    *test coverage; please message Forrest Ledesma on Teams with price or page [phone]*

## 2022-10-15 NOTE — DISCHARGE NOTE PROVIDER - HOSPITAL COURSE
58 yo male with MHx of HTN, Diabetes mellitus Type 2, CAD, EtOH overuse p/w deep R forearm laceration with exposed muscle and tendon; suspected tendon injury. s/p suture in ER.      Laceration of right forearm.   - Flap type laceration, dorsal, 6cm, with sanguineous drainage, exposed muscle and tendon;   Injury complication per ED:  tendon injury, known tendon laceration prior to stitching   - Vital signs stable, no leukocytosis  - s/p tetanus in ED  -s/p surgical correction 10/14, tolerated procedure well   -continue antibiotics and surgeon cleared pt to be dc'ed and pt will f/u with plastics in 2 weeks.     Type 2 diabetes mellitus with hyperglycemia, with long-term current use of insulin.   - Hold oral agents; On Basaglar and Afrezza;   - Started on 15 Lantus qhs   - Admelog 2 units TID, ISS premeal and bedtime  -A1C indicative of uncontrolled T2DM at 10.6  -Pt stated he is seeing a new endocrinologist 2 weeks prior to hospital course, upon discharge patient will require to follow up with same endocrinologist    History of STEMI   - Hx of inferolateral STEMI s/p BECKY to pCX 100% occlusion, pLAD 30%, dOM 100% EF 30-35%, LVEDP 36 (3/1/22), IABP placed  - 2D Echo needed to reassess LVEF (pt did not f/u)  - c/w Asa 81mg QD, sacubitril-valsartan 24mg-26mg BID, metoprolol succinate 50mg QD, ticagrelor 90 mg Q12H.     CAD   - s/p stent   - C/w Asa 81mg QD, sacubitril-valsartan 24mg-26mg BID, metoprolol succinate 50mg QD, ticagrelor 90 mg Q12H.    HTN   -c/w home BP medications.    On 10/16/22, case was discussed with Dr. Richards , patient is medically cleared and optimized for discharge today. All medications were reviewed with attending, and sent to mutually agreed upon pharmacy.

## 2022-10-15 NOTE — DISCHARGE NOTE PROVIDER - NSDCCPCAREPLAN_GEN_ALL_CORE_FT
PRINCIPAL DISCHARGE DIAGNOSIS  Diagnosis: Laceration involving tendon  Assessment and Plan of Treatment: You came to the hospital with a laceration on your left forearm, it was closed in the ER but plastic surgery said you sustained damaged tendons and you were operated on 10/14 to repair those tendons. You tolerated the procedure well. and you were given antibiotics after the procedure. Please continue to take augmentin for 10 days after being discharged from the hospital. Follow up with******* Dr Michelle Sage at [address] [place] [address] [address] and call [phone] to schedule an appointment 1-2 weeks from today.      SECONDARY DISCHARGE DIAGNOSES  Diagnosis: Type 2 diabetes mellitus with hyperglycemia, with long-term current use of insulin  Assessment and Plan of Treatment: During your hospital stay your blood sugar was elevated and your A1C was 10.6%. Please follow up with your endocrine doctor as previously discussed for continued management of your diabetes. And take your diabetes medication as instructed.    Diagnosis: CAD (coronary artery disease)  Assessment and Plan of Treatment: Continue to take your medication as instructed and follow up with your primary care doctor.    Diagnosis: HTN (hypertension)  Assessment and Plan of Treatment: Continue to follow up with your primary care doctor in 1-2 weeks for further care.

## 2022-10-15 NOTE — DISCHARGE NOTE PROVIDER - CARE PROVIDER_API CALL
Michelle Sage (MD)  Plastic Surgery  [address] [place] [address], [address]  [place], [place] [id number]  Phone: [phone]  Fax: [phone]  Follow Up Time:    Michelle Sage)  Plastic Surgery  [address] [facility], [address]  [place], [place] [id number]  Phone: [phone]  Fax: [phone]  Follow Up Time:     Charlene Francis)  Internal Medicine  [address] [facility], [address]  [place] [address]  Phone: [phone]  Fax: [phone]  Follow Up Time:

## 2022-10-16 ENCOUNTER — TRANSCRIPTION ENCOUNTER (OUTPATIENT)
Age: 57
End: 2022-10-16

## 2022-10-16 VITALS
OXYGEN SATURATION: 99 % | HEART RATE: 72 BPM | TEMPERATURE: 99 F | RESPIRATION RATE: 18 BRPM | SYSTOLIC BLOOD PRESSURE: 138 MMHG | DIASTOLIC BLOOD PRESSURE: 99 MMHG

## 2022-10-16 LAB
ANION GAP SERPL CALC-SCNC: 10 MMOL/L — SIGNIFICANT CHANGE UP (ref 7–14)
BASOPHILS # BLD AUTO: 0.02 K/UL — SIGNIFICANT CHANGE UP (ref 0–0.2)
BASOPHILS NFR BLD AUTO: 0.4 % — SIGNIFICANT CHANGE UP (ref 0–2)
BUN SERPL-MCNC: 18 MG/DL — SIGNIFICANT CHANGE UP (ref 7–23)
CALCIUM SERPL-MCNC: 8.8 MG/DL — SIGNIFICANT CHANGE UP (ref 8.4–10.5)
CHLORIDE SERPL-SCNC: 99 MMOL/L — SIGNIFICANT CHANGE UP (ref 98–107)
CO2 SERPL-SCNC: 24 MMOL/L — SIGNIFICANT CHANGE UP (ref 22–31)
CREAT SERPL-MCNC: 0.85 MG/DL — SIGNIFICANT CHANGE UP (ref 0.5–1.3)
EGFR: 103 ML/MIN/1.73M2 — SIGNIFICANT CHANGE UP
EOSINOPHIL # BLD AUTO: 0.2 K/UL — SIGNIFICANT CHANGE UP (ref 0–0.5)
EOSINOPHIL NFR BLD AUTO: 4 % — SIGNIFICANT CHANGE UP (ref 0–6)
GLUCOSE BLDC GLUCOMTR-MCNC: 304 MG/DL — HIGH (ref 70–99)
GLUCOSE BLDC GLUCOMTR-MCNC: 91 MG/DL — SIGNIFICANT CHANGE UP (ref 70–99)
GLUCOSE SERPL-MCNC: 105 MG/DL — HIGH (ref 70–99)
HCT VFR BLD CALC: 42.5 % — SIGNIFICANT CHANGE UP (ref 39–50)
HGB BLD-MCNC: 14.6 G/DL — SIGNIFICANT CHANGE UP (ref 13–17)
IANC: 2.3 K/UL — SIGNIFICANT CHANGE UP (ref 1.8–7.4)
IMM GRANULOCYTES NFR BLD AUTO: 0.6 % — SIGNIFICANT CHANGE UP (ref 0–0.9)
LYMPHOCYTES # BLD AUTO: 2.11 K/UL — SIGNIFICANT CHANGE UP (ref 1–3.3)
LYMPHOCYTES # BLD AUTO: 42 % — SIGNIFICANT CHANGE UP (ref 13–44)
MAGNESIUM SERPL-MCNC: 1.6 MG/DL — SIGNIFICANT CHANGE UP (ref 1.6–2.6)
MCHC RBC-ENTMCNC: 30.2 PG — SIGNIFICANT CHANGE UP (ref 27–34)
MCHC RBC-ENTMCNC: 34.4 GM/DL — SIGNIFICANT CHANGE UP (ref 32–36)
MCV RBC AUTO: 88 FL — SIGNIFICANT CHANGE UP (ref 80–100)
MONOCYTES # BLD AUTO: 0.36 K/UL — SIGNIFICANT CHANGE UP (ref 0–0.9)
MONOCYTES NFR BLD AUTO: 7.2 % — SIGNIFICANT CHANGE UP (ref 2–14)
NEUTROPHILS # BLD AUTO: 2.3 K/UL — SIGNIFICANT CHANGE UP (ref 1.8–7.4)
NEUTROPHILS NFR BLD AUTO: 45.8 % — SIGNIFICANT CHANGE UP (ref 43–77)
NRBC # BLD: 0 /100 WBCS — SIGNIFICANT CHANGE UP (ref 0–0)
NRBC # FLD: 0 K/UL — SIGNIFICANT CHANGE UP (ref 0–0)
PHOSPHATE SERPL-MCNC: 3.7 MG/DL — SIGNIFICANT CHANGE UP (ref 2.5–4.5)
PLATELET # BLD AUTO: 159 K/UL — SIGNIFICANT CHANGE UP (ref 150–400)
POTASSIUM SERPL-MCNC: 4 MMOL/L — SIGNIFICANT CHANGE UP (ref 3.5–5.3)
POTASSIUM SERPL-SCNC: 4 MMOL/L — SIGNIFICANT CHANGE UP (ref 3.5–5.3)
RBC # BLD: 4.83 M/UL — SIGNIFICANT CHANGE UP (ref 4.2–5.8)
RBC # FLD: 12.6 % — SIGNIFICANT CHANGE UP (ref 10.3–14.5)
SODIUM SERPL-SCNC: 133 MMOL/L — LOW (ref 135–145)
WBC # BLD: 5.02 K/UL — SIGNIFICANT CHANGE UP (ref 3.8–10.5)
WBC # FLD AUTO: 5.02 K/UL — SIGNIFICANT CHANGE UP (ref 3.8–10.5)

## 2022-10-16 PROCEDURE — 99239 HOSP IP/OBS DSCHRG MGMT >30: CPT

## 2022-10-16 RX ADMIN — AMPICILLIN SODIUM AND SULBACTAM SODIUM 200 GRAM(S): 250; 125 INJECTION, POWDER, FOR SUSPENSION INTRAMUSCULAR; INTRAVENOUS at 03:00

## 2022-10-16 RX ADMIN — AMPICILLIN SODIUM AND SULBACTAM SODIUM 200 GRAM(S): 250; 125 INJECTION, POWDER, FOR SUSPENSION INTRAMUSCULAR; INTRAVENOUS at 08:56

## 2022-10-16 RX ADMIN — Medication 1 MILLIGRAM(S): at 12:04

## 2022-10-16 RX ADMIN — Medication 2 UNIT(S): at 12:23

## 2022-10-16 RX ADMIN — Medication 4: at 12:22

## 2022-10-16 RX ADMIN — LEVETIRACETAM 500 MILLIGRAM(S): 250 TABLET, FILM COATED ORAL at 05:40

## 2022-10-16 RX ADMIN — Medication 2 UNIT(S): at 08:10

## 2022-10-16 RX ADMIN — SACUBITRIL AND VALSARTAN 1 TABLET(S): 24; 26 TABLET, FILM COATED ORAL at 05:40

## 2022-10-16 RX ADMIN — Medication 50 MILLIGRAM(S): at 05:40

## 2022-10-16 RX ADMIN — Medication 81 MILLIGRAM(S): at 12:04

## 2022-10-16 RX ADMIN — TICAGRELOR 90 MILLIGRAM(S): 90 TABLET ORAL at 05:40

## 2022-10-16 RX ADMIN — Medication 1 TABLET(S): at 12:03

## 2022-10-16 NOTE — PROGRESS NOTE ADULT - PROBLEM SELECTOR PLAN 3
Hx of inferolateral STEMI s/p BECKY to pCX 100% occlusion, pLAD 30%, dOM 100% EF 30-35%, LVEDP 36 (3/1/22), IABP placed  - 2D Echo needed to reassess LVEF (pt did not f/u)  - c/w Asa 81mg QD, sacubitril-valsartan 24mg-26mg BID, metoprolol succinate 50mg QD, ticagrelor 90 mg Q12H
Hx of inferolateral STEMI s/p BECKY to pCX 100% occlusion, pLAD 30%, dOM 100% EF 30-35%, LVEDP 36 (3/1/22), IABP placed  - 2D Echo needed to reassess LVEF (pt did not f/u)  - c/w Asa 81mg QD, sacubitril-valsartan 24mg-26mg BID, metoprolol succinate 50mg QD, ticagrelor 90 mg Q12H
Hold oral agents; On Basaglar and Afrezza;   - Started on 15 Lantus qhs   - Admelog 2 units TID, ISS premeal and bedtime  A1C indicative of uncontrolled T2DM at 10.6  If sugars are high despite insulin, will need endocrinology consult. However, upon discharge patient will require to follow up with an endocrinologist.

## 2022-10-16 NOTE — PROGRESS NOTE ADULT - PROBLEM SELECTOR PLAN 2
- s/p sutures in ED until surgery [facility]. for Friday 10/14  - Hand surgery consulted (Dr. Sage) and discussed regarding pending hand surgery on Friday 10/14/22  - iso of recent MI Hand surgery requesting Cardiologic clearance   - IV Unasyn Q6H  -TTE ordered
Hold oral agents; On Basaglar and Afrezza;   - Started on 15 Lantus qhs   - Admelog 2 units TID, ISS premeal and bedtime  A1C indicative of uncontrolled T2DM at 10.6  If sugars are high despite insulin, will need endocrinology consult. However, upon discharge patient will require to follow up with an endocrinologist.  Yesterday with high FS after surgery, will monitor today on current regiment.
Hold oral agents; On Basaglar and Afrezza;   - Started on 15 Lantus qhs   - Admelog 2 units TID, ISS premeal and bedtime  A1C indicative of uncontrolled T2DM at 10.6  If sugars are high despite insulin, will need endocrinology consult. However, upon discharge patient will require to follow up with an endocrinologist.  Yesterday with high FS after surgery, will monitor today on current regiment.

## 2022-10-16 NOTE — PROGRESS NOTE ADULT - SUBJECTIVE AND OBJECTIVE BOX
Ike Richards MD  Academic Hospitalist  Pager [phone]  Email: [email or web address]  Available on [employer] Teams        PROGRESS NOTE:     Patient is a 57y old  Male who presents with a chief complaint of R wrist laceration (13 Oct 2022 16:23)      SUBJECTIVE / OVERNIGHT EVENTS:  Patient seen and examined this morning. S/P surgery to right hand. Appears to have tolerated procedure well. Pain well controlled.   ADDITIONAL REVIEW OF SYSTEMS:  No f/c/n/v    MEDICATIONS  (STANDING):  ampicillin/sulbactam  IVPB 3 Gram(s) IV Intermittent every 6 hours  aspirin  chewable 81 milliGRAM(s) Oral daily  atorvastatin 80 milliGRAM(s) Oral at bedtime  dextrose 5%. 1000 milliLiter(s) (50 mL/Hr) IV Continuous <Continuous>  dextrose 5%. 1000 milliLiter(s) (100 mL/Hr) IV Continuous <Continuous>  dextrose 50% Injectable 25 Gram(s) IV Push once  dextrose 50% Injectable 12.5 Gram(s) IV Push once  dextrose 50% Injectable 25 Gram(s) IV Push once  folic acid 1 milliGRAM(s) Oral daily  glucagon  Injectable 1 milliGRAM(s) IntraMuscular once  influenza   Vaccine 0.5 milliLiter(s) IntraMuscular once  insulin glargine Injectable (LANTUS) 15 Unit(s) SubCutaneous at bedtime  insulin lispro (ADMELOG) corrective regimen sliding scale   SubCutaneous three times a day before meals  insulin lispro (ADMELOG) corrective regimen sliding scale   SubCutaneous at bedtime  insulin lispro Injectable (ADMELOG) 2 Unit(s) SubCutaneous three times a day before meals  levETIRAcetam 500 milliGRAM(s) Oral two times a day  metoprolol succinate ER 50 milliGRAM(s) Oral daily  multivitamin 1 Tablet(s) Oral daily  sacubitril 24 mG/valsartan 26 mG 1 Tablet(s) Oral two times a day  ticagrelor 90 milliGRAM(s) Oral every 12 hours    MEDICATIONS  (PRN):  acetaminophen     Tablet .. 650 milliGRAM(s) Oral every 6 hours PRN Temp greater or equal to 38C (100.4F), Mild Pain (1 - 3)  aluminum hydroxide/magnesium hydroxide/simethicone Suspension 30 milliLiter(s) Oral every 4 hours PRN Dyspepsia  dextrose Oral Gel 15 Gram(s) Oral once PRN Blood Glucose LESS THAN 70 milliGRAM(s)/deciliter  melatonin 3 milliGRAM(s) Oral at bedtime PRN Insomnia  morphine  - Injectable 2 milliGRAM(s) IV Push every 3 hours PRN Moderate Pain (4 - 6)  morphine  - Injectable 4 milliGRAM(s) IV Push every 6 hours PRN Severe Pain (7 - 10)  ondansetron Injectable 4 milliGRAM(s) IV Push every 8 hours PRN Nausea and/or Vomiting  oxycodone    5 mG/acetaminophen 325 mG 1 Tablet(s) Oral every 4 hours PRN Severe Pain (7 - 10)      CAPILLARY BLOOD GLUCOSE      POCT Blood Glucose.: 217 mg/dL (15 Oct 2022 07:22)  POCT Blood Glucose.: 321 mg/dL (15 Oct 2022 01:54)  POCT Blood Glucose.: 469 mg/dL (14 Oct 2022 22:53)  POCT Blood Glucose.: 483 mg/dL (14 Oct 2022 22:48)  POCT Blood Glucose.: 195 mg/dL (14 Oct 2022 16:58)  POCT Blood Glucose.: 92 mg/dL (14 Oct 2022 13:30)    I&O's Summary    14 Oct 2022 07:01  -  15 Oct 2022 07:00  --------------------------------------------------------  IN: 100 mL / OUT: 0 mL / NET: 100 mL        PHYSICAL EXAM:  Vital Signs Last 24 Hrs  T(C): 36.6 (15 Oct 2022 09:19), Max: 36.8 (14 Oct 2022 22:10)  T(F): 97.8 (15 Oct 2022 09:19), Max: 98.3 (14 Oct 2022 22:10)  HR: 71 (15 Oct 2022 09:19) (61 - 73)  BP: 111/80 (15 Oct 2022 09:19) (111/80 - 147/97)  BP(mean): 109 (14 Oct 2022 15:30) (99 - 109)  RR: 18 (15 Oct 2022 09:19) (14 - 18)  SpO2: 98% (15 Oct 2022 09:19) (98% - 100%)    Parameters below as of 15 Oct 2022 09:19  Patient On (Oxygen Delivery Method): room air          CONSTITUTIONAL: NAD, well-developed, pleasant  RESPIRATORY: Normal respiratory effort; lungs are clear to auscultation bilaterally  CARDIOVASCULAR: Regular rate and rhythm, normal S1 and S2, no murmur/rub/gallop; No lower extremity edema; ABDOMEN: Nontender to palpation, normoactive bowel sounds, no rebound/guarding; No hepatosplenomegaly  MUSCLOSKELETAL/Neurological: no clubbing or cyanosis of digits; Left arm dressed, c/d/i  PSYCH: A+O to person, place, and time; affect appropriate  LABS:                        16.0   7.00  )-----------( 151      ( 15 Oct 2022 06:48 )             46.5     10-15    134<L>  |  98  |  22  ----------------------------<  249<H>  4.6   |  23  |  1.04    Ca    8.9      15 Oct 2022 06:48  Phos  3.7     10-15  Mg     1.90     10-15    TPro  7.2  /  Alb  4.1  /  TBili  0.6  /  DBili  x   /  AST  25  /  ALT  34  /  AlkPhos  67  10-15    PT/INR - ( 14 Oct 2022 07:10 )   PT: 11.6 sec;   INR: 1.00 ratio         PTT - ( 14 Oct 2022 07:10 )  PTT:30.4 sec      Urinalysis Basic - ( 15 Oct 2022 02:30 )    Color: Light Yellow / Appearance: Clear / S.025 / pH: x  Gluc: x / Ketone: Trace  / Bili: Negative / Urobili: <2 mg/dL   Blood: x / Protein: Negative / Nitrite: Negative   Leuk Esterase: Negative / RBC: x / WBC x   Sq Epi: x / Non Sq Epi: x / Bacteria: x          RADIOLOGY & ADDITIONAL TESTS:  Results Reviewed:   Imaging Personally Reviewed:  Electrocardiogram Personally Reviewed:    COORDINATION OF CARE:  Care Discussed with Consultants/Other Providers [Y/N]:  Prior or Outpatient Records Reviewed [Y/N]:  
Ike Richards MD  Academic Hospitalist  Pager [phone]  Email: [email or web address]  Available on [employer] Teams        PROGRESS NOTE:     Patient is a 57y old  Male who presents with a chief complaint of R wrist laceration (15 Oct 2022 11:36)      SUBJECTIVE / OVERNIGHT EVENTS:  Patient seen and examined this morning. Patient eager for discharge. Follow up appt with pcp tomorrow, and endocrinologist in 3 months.   ADDITIONAL REVIEW OF SYSTEMS:  No f/c/n/v    MEDICATIONS  (STANDING):  ampicillin/sulbactam  IVPB 3 Gram(s) IV Intermittent every 6 hours  aspirin  chewable 81 milliGRAM(s) Oral daily  atorvastatin 80 milliGRAM(s) Oral at bedtime  dextrose 5%. 1000 milliLiter(s) (50 mL/Hr) IV Continuous <Continuous>  dextrose 5%. 1000 milliLiter(s) (100 mL/Hr) IV Continuous <Continuous>  dextrose 50% Injectable 25 Gram(s) IV Push once  dextrose 50% Injectable 12.5 Gram(s) IV Push once  dextrose 50% Injectable 25 Gram(s) IV Push once  folic acid 1 milliGRAM(s) Oral daily  glucagon  Injectable 1 milliGRAM(s) IntraMuscular once  influenza   Vaccine 0.5 milliLiter(s) IntraMuscular once  insulin glargine Injectable (LANTUS) 15 Unit(s) SubCutaneous at bedtime  insulin lispro (ADMELOG) corrective regimen sliding scale   SubCutaneous three times a day before meals  insulin lispro (ADMELOG) corrective regimen sliding scale   SubCutaneous at bedtime  insulin lispro Injectable (ADMELOG) 2 Unit(s) SubCutaneous three times a day before meals  levETIRAcetam 500 milliGRAM(s) Oral two times a day  metoprolol succinate ER 50 milliGRAM(s) Oral daily  multivitamin 1 Tablet(s) Oral daily  sacubitril 24 mG/valsartan 26 mG 1 Tablet(s) Oral two times a day  ticagrelor 90 milliGRAM(s) Oral every 12 hours    MEDICATIONS  (PRN):  acetaminophen     Tablet .. 650 milliGRAM(s) Oral every 6 hours PRN Temp greater or equal to 38C (100.4F), Mild Pain (1 - 3)  aluminum hydroxide/magnesium hydroxide/simethicone Suspension 30 milliLiter(s) Oral every 4 hours PRN Dyspepsia  dextrose Oral Gel 15 Gram(s) Oral once PRN Blood Glucose LESS THAN 70 milliGRAM(s)/deciliter  melatonin 3 milliGRAM(s) Oral at bedtime PRN Insomnia  morphine  - Injectable 2 milliGRAM(s) IV Push every 3 hours PRN Moderate Pain (4 - 6)  morphine  - Injectable 4 milliGRAM(s) IV Push every 6 hours PRN Severe Pain (7 - 10)  ondansetron Injectable 4 milliGRAM(s) IV Push every 8 hours PRN Nausea and/or Vomiting  oxycodone    5 mG/acetaminophen 325 mG 1 Tablet(s) Oral every 4 hours PRN Severe Pain (7 - 10)      CAPILLARY BLOOD GLUCOSE      POCT Blood Glucose.: 91 mg/dL (16 Oct 2022 07:23)  POCT Blood Glucose.: 234 mg/dL (15 Oct 2022 21:38)  POCT Blood Glucose.: 322 mg/dL (15 Oct 2022 17:06)  POCT Blood Glucose.: 292 mg/dL (15 Oct 2022 11:46)    I&O's Summary      PHYSICAL EXAM:  Vital Signs Last 24 Hrs  T(C): 36.6 (16 Oct 2022 05:40), Max: 36.9 (15 Oct 2022 16:54)  T(F): 97.9 (16 Oct 2022 05:40), Max: 98.4 (15 Oct 2022 16:54)  HR: 65 (16 Oct 2022 05:40) (65 - 70)  BP: 140/98 (16 Oct 2022 05:40) (136/93 - 140/98)  BP(mean): --  RR: 16 (16 Oct 2022 05:40) (16 - 18)  SpO2: 100% (16 Oct 2022 05:40) (96% - 100%)    Parameters below as of 16 Oct 2022 05:40  Patient On (Oxygen Delivery Method): room air        CONSTITUTIONAL: NAD, well-developed, pleasant  RESPIRATORY: Normal respiratory effort; lungs are clear to auscultation bilaterally  CARDIOVASCULAR: Regular rate and rhythm, normal S1 and S2, no murmur/rub/gallop; No lower extremity edema; ABDOMEN: Nontender to palpation, normoactive bowel sounds, no rebound/guarding; No hepatosplenomegaly  MUSCLOSKELETAL/Neurological: no clubbing or cyanosis of digits; Left arm dressed, c/d/i  PSYCH: A+O to person, place, and time; affect appropriate    LABS:                        14.6   5.02  )-----------( 159      ( 16 Oct 2022 06:30 )             42.5     10-16    133<L>  |  99  |  18  ----------------------------<  105<H>  4.0   |  24  |  0.85    Ca    8.8      16 Oct 2022 06:30  Phos  3.7     10-16  Mg     1.60     10-16    TPro  7.2  /  Alb  4.1  /  TBili  0.6  /  DBili  x   /  AST  25  /  ALT  34  /  AlkPhos  67  10-15          Urinalysis Basic - ( 15 Oct 2022 02:30 )    Color: Light Yellow / Appearance: Clear / S.025 / pH: x  Gluc: x / Ketone: Trace  / Bili: Negative / Urobili: <2 mg/dL   Blood: x / Protein: Negative / Nitrite: Negative   Leuk Esterase: Negative / RBC: x / WBC x   Sq Epi: x / Non Sq Epi: x / Bacteria: x          RADIOLOGY & ADDITIONAL TESTS:  Results Reviewed:   Imaging Personally Reviewed:  Electrocardiogram Personally Reviewed:    COORDINATION OF CARE:  Care Discussed with Consultants/Other Providers [Y/N]:  Prior or Outpatient Records Reviewed [Y/N]:  
Ike Richards MD  Academic Hospitalist  Pager [phone]  Email: [email or web address]  Available on [employer] Teams        PROGRESS NOTE:     Patient is a 57y old  Male who presents with a chief complaint of R wrist laceration (13 Oct 2022 04:49)      SUBJECTIVE / OVERNIGHT EVENTS:  Patient seen and examined this morning. No new complaints.   ADDITIONAL REVIEW OF SYSTEMS:  Denies f/c/n/v    MEDICATIONS  (STANDING):  ampicillin/sulbactam  IVPB      ampicillin/sulbactam  IVPB 3 Gram(s) IV Intermittent every 6 hours  aspirin enteric coated 81 milliGRAM(s) Oral daily  atorvastatin 80 milliGRAM(s) Oral at bedtime  dextrose 5%. 1000 milliLiter(s) (50 mL/Hr) IV Continuous <Continuous>  dextrose 5%. 1000 milliLiter(s) (100 mL/Hr) IV Continuous <Continuous>  dextrose 50% Injectable 25 Gram(s) IV Push once  dextrose 50% Injectable 12.5 Gram(s) IV Push once  dextrose 50% Injectable 25 Gram(s) IV Push once  folic acid 1 milliGRAM(s) Oral daily  glucagon  Injectable 1 milliGRAM(s) IntraMuscular once  influenza   Vaccine 0.5 milliLiter(s) IntraMuscular once  insulin glargine Injectable (LANTUS) 15 Unit(s) SubCutaneous at bedtime  insulin lispro (ADMELOG) corrective regimen sliding scale   SubCutaneous three times a day before meals  insulin lispro (ADMELOG) corrective regimen sliding scale   SubCutaneous at bedtime  insulin lispro Injectable (ADMELOG) 2 Unit(s) SubCutaneous three times a day before meals  levETIRAcetam 500 milliGRAM(s) Oral two times a day  metoprolol succinate ER 50 milliGRAM(s) Oral daily  multivitamin 1 Tablet(s) Oral daily  sacubitril 24 mG/valsartan 26 mG 1 Tablet(s) Oral two times a day  ticagrelor 90 milliGRAM(s) Oral every 12 hours    MEDICATIONS  (PRN):  acetaminophen     Tablet .. 650 milliGRAM(s) Oral every 6 hours PRN Temp greater or equal to 38C (100.4F), Mild Pain (1 - 3)  aluminum hydroxide/magnesium hydroxide/simethicone Suspension 30 milliLiter(s) Oral every 4 hours PRN Dyspepsia  dextrose Oral Gel 15 Gram(s) Oral once PRN Blood Glucose LESS THAN 70 milliGRAM(s)/deciliter  melatonin 3 milliGRAM(s) Oral at bedtime PRN Insomnia  morphine  - Injectable 2 milliGRAM(s) IV Push every 3 hours PRN Moderate Pain (4 - 6)  morphine  - Injectable 4 milliGRAM(s) IV Push every 6 hours PRN Severe Pain (7 - 10)  ondansetron Injectable 4 milliGRAM(s) IV Push every 8 hours PRN Nausea and/or Vomiting      CAPILLARY BLOOD GLUCOSE      POCT Blood Glucose.: 205 mg/dL (13 Oct 2022 11:56)  POCT Blood Glucose.: 88 mg/dL (13 Oct 2022 07:18)  POCT Blood Glucose.: 241 mg/dL (12 Oct 2022 23:20)  POCT Blood Glucose.: 147 mg/dL (12 Oct 2022 17:43)    I&O's Summary    12 Oct 2022 07:01  -  13 Oct 2022 07:00  --------------------------------------------------------  IN: 0 mL / OUT: 0 mL / NET: 0 mL        PHYSICAL EXAM:  Vital Signs Last 24 Hrs  T(C): 36.7 (13 Oct 2022 10:06), Max: 36.8 (12 Oct 2022 19:20)  T(F): 98 (13 Oct 2022 10:06), Max: 98.2 (12 Oct 2022 19:20)  HR: 87 (13 Oct 2022 10:06) (62 - 87)  BP: 122/91 (13 Oct 2022 10:06) (122/91 - 160/98)  BP(mean): --  RR: 18 (13 Oct 2022 10:06) (16 - 18)  SpO2: 96% (13 Oct 2022 10:06) (96% - 100%)    Parameters below as of 13 Oct 2022 10:06  Patient On (Oxygen Delivery Method): room air        CONSTITUTIONAL: NAD, well-developed, pleasant  RESPIRATORY: Normal respiratory effort; lungs are clear to auscultation bilaterally  CARDIOVASCULAR: Regular rate and rhythm, normal S1 and S2, no murmur/rub/gallop; No lower extremity edema; ABDOMEN: Nontender to palpation, normoactive bowel sounds, no rebound/guarding; No hepatosplenomegaly  MUSCLOSKELETAL/Neurological: no clubbing or cyanosis of digits; Left arm dressed, as per admitting noted +reduced  strength L>R, difficulty with wrist flexion and extension - limited exam due to severe pain   PSYCH: A+O to person, place, and time; affect appropriate    LABS:                        14.4   4.69  )-----------( 162      ( 13 Oct 2022 08:18 )             43.2     10-13    136  |  98  |  17  ----------------------------<  88  3.6   |  27  |  0.88    Ca    8.5      13 Oct 2022 08:18    TPro  6.4  /  Alb  3.9  /  TBili  0.9  /  DBili  x   /  AST  42<H>  /  ALT  43<H>  /  AlkPhos  53  10-13    PT/INR - ( 12 Oct 2022 16:05 )   PT: 11.7 sec;   INR: 1.01 ratio         PTT - ( 12 Oct 2022 16:05 )  PTT:27.0 sec            RADIOLOGY & ADDITIONAL TESTS:  Results Reviewed:   Imaging Personally Reviewed:  Electrocardiogram Personally Reviewed:    COORDINATION OF CARE:  Care Discussed with Consultants/Other Providers [Y/N]:  Prior or Outpatient Records Reviewed [Y/N]:

## 2022-10-16 NOTE — PROGRESS NOTE ADULT - ASSESSMENT
56 yo male with MHx of HTN, Diabetes mellitus Type 2, CAD, EtOH overuse a/w deep R forearm laceration with exposed muscle and tendon; suspected tendon injury; Formal Plastic c/s and OR intervention pending;
58 yo male with MHx of HTN, Diabetes mellitus Type 2, CAD, EtOH overuse a/w deep R forearm laceration with exposed muscle and tendon; suspected tendon injury; Formal Plastic c/s and OR intervention pending;
58 yo male with MHx of HTN, Diabetes mellitus Type 2, CAD, EtOH overuse a/w deep R forearm laceration with exposed muscle and tendon; suspected tendon injury; Formal Plastic c/s and OR intervention pending;

## 2022-10-16 NOTE — DISCHARGE NOTE NURSING/CASE MANAGEMENT/SOCIAL WORK - NSDCVIVACCINE_GEN_ALL_CORE_FT
influenza, injectable, quadrivalent, preservative free; 08-Oct-2020 14:55; Nina Webb (RN); [employer]; [id number] (Exp. Date: 30-Jun-2021); IntraMuscular; Deltoid Right.; 0.5 milliLiter(s); VIS (VIS Published: 15-Aug-2019, VIS Presented: 08-Oct-2020);   Tdap; 12-Oct-2022 15:32; Mirta Park (RN); [employer]; [id number] (Exp. Date: 04-Nov-2024); IntraMuscular; Deltoid Right.; 0.5 milliLiter(s); VIS (VIS Published: 09-May-2013, VIS Presented: 12-Oct-2022);

## 2022-10-16 NOTE — PROGRESS NOTE ADULT - PROBLEM SELECTOR PROBLEM 2
Preoperative clearance
Type 2 diabetes mellitus with hyperglycemia, with long-term current use of insulin
Type 2 diabetes mellitus with hyperglycemia, with long-term current use of insulin

## 2022-10-16 NOTE — PROGRESS NOTE ADULT - PROBLEM SELECTOR PROBLEM 4
CAD (coronary artery disease)
STEMI (ST elevation myocardial infarction)
CAD (coronary artery disease)

## 2022-10-16 NOTE — PROGRESS NOTE ADULT - PROBLEM SELECTOR PLAN 5
-c/w home BP medications
- s/p stent   - C/w Asa 81mg QD, sacubitril-valsartan 24mg-26mg BID, metoprolol succinate 50mg QD, ticagrelor 90 mg Q12H
-c/w home BP medications

## 2022-10-16 NOTE — DISCHARGE NOTE NURSING/CASE MANAGEMENT/SOCIAL WORK - NSDCPEFALRISK_GEN_ALL_CORE
For information on Fall & Injury Prevention, visit: [email or web address] OR  [email or web address] OR  https://www.cdc.gov/steadi/patient.html

## 2022-10-16 NOTE — PROGRESS NOTE ADULT - PROBLEM SELECTOR PLAN 4
Hx of inferolateral STEMI s/p BECKY to pCX 100% occlusion, pLAD 30%, dOM 100% EF 30-35%, LVEDP 36 (3/1/22), IABP placed  - 2D Echo needed to reassess LVEF (pt did not f/u)  - c/w Asa 81mg QD, sacubitril-valsartan 24mg-26mg BID, metoprolol succinate 50mg QD, ticagrelor 90 mg Q12H
- s/p stent   - C/w Asa 81mg QD, sacubitril-valsartan 24mg-26mg BID, metoprolol succinate 50mg QD, ticagrelor 90 mg Q12H
- s/p stent   - C/w Asa 81mg QD, sacubitril-valsartan 24mg-26mg BID, metoprolol succinate 50mg QD, ticagrelor 90 mg Q12H

## 2022-10-16 NOTE — DISCHARGE NOTE NURSING/CASE MANAGEMENT/SOCIAL WORK - PATIENT PORTAL LINK FT
You can access the FollowMyHealth Patient Portal offered by [facility] by registering at the following website: [email or web address]. By joining [employer]’s FollowMyHealth portal, you will also be able to view your health information using other applications (apps) compatible with our system.

## 2022-10-16 NOTE — PROGRESS NOTE ADULT - PROBLEM SELECTOR PROBLEM 3
STEMI (ST elevation myocardial infarction)
Type 2 diabetes mellitus with hyperglycemia, with long-term current use of insulin
STEMI (ST elevation myocardial infarction)

## 2022-10-16 NOTE — PROGRESS NOTE ADULT - PROBLEM SELECTOR PLAN 1
Flap type laceration, dorsal, 6cm, with sanguineous drainage, exposed muscle and tendon;   Injury complication per ED:  tendon injury, known tendon laceration prior to stitching   - VSS, no leukocytosis  - s/p tetanus in ED  s/p surgical correction 10/14, tolerated procedure well  continue antibiotics and follow up plastics for further recs
Flap type laceration, dorsal, 6cm, with sanguineous drainage, exposed muscle and tendon;   Injury complication per ED:  tendon injury, known tendon laceration prior to stitching   - VSS, no leukocytosis  - s/p tetanus in ED  - Pain control: Morphine IVP: 2mg and 4mg for moderate and severe pain respectively  - forearm XR: prelim read shows no fracture  - Hand surgery consulted (Dr. Sage)  surgery hope. for Friday 10/14/22   - IV Unasyn ppx Q6H started  Given recent MI and extensive cardiac history, will request cardiology optimization.
Flap type laceration, dorsal, 6cm, with sanguineous drainage, exposed muscle and tendon;   Injury complication per ED:  tendon injury, known tendon laceration prior to stitching   - VSS, no leukocytosis  - s/p tetanus in ED  s/p surgical correction 10/14, tolerated procedure wll  continue antibiotics and follow up plastics for further recs

## 2022-10-16 NOTE — PROGRESS NOTE ADULT - PROBLEM SELECTOR PLAN 6
- Monitor off CIWA  - SW cs requested   - Continue to monitor for any signs of withdrawal
-c/w home BP medications
- Monitor off CIWA  - SW cs requested   - Continue to monitor for any signs of withdrawal

## 2022-10-16 NOTE — PROGRESS NOTE ADULT - PROBLEM SELECTOR PLAN 7
- DVT ppx: Low risk   - Diet: CC with snack
- Monitor off CIWA  - SW cs requested   - Continue to monitor for any signs of withdrawal
- DVT ppx: Low risk   - Diet: CC with snack

## 2022-10-16 NOTE — PROGRESS NOTE ADULT - NSPROGADDITIONALINFOA_GEN_ALL_CORE
Optimized for discharge, has appt to follow up with PCP and endocrinologist. Patient will monitor FS as outpatient.

## 2022-10-24 LAB — SURGICAL PATHOLOGY STUDY: SIGNIFICANT CHANGE UP

## 2022-11-13 ENCOUNTER — INPATIENT (INPATIENT)
Facility: HOSPITAL | Age: 57
LOS: 4 days | Discharge: HOME HEALTH | End: 2022-11-18
Attending: STUDENT IN AN ORGANIZED HEALTH CARE EDUCATION/TRAINING PROGRAM | Admitting: STUDENT IN AN ORGANIZED HEALTH CARE EDUCATION/TRAINING PROGRAM

## 2022-11-13 VITALS
DIASTOLIC BLOOD PRESSURE: 54 MMHG | OXYGEN SATURATION: 97 % | RESPIRATION RATE: 24 BRPM | HEART RATE: 100 BPM | HEIGHT: 67 IN | TEMPERATURE: 98 F | SYSTOLIC BLOOD PRESSURE: 85 MMHG

## 2022-11-13 DIAGNOSIS — Z95.818 PRESENCE OF OTHER CARDIAC IMPLANTS AND GRAFTS: Chronic | ICD-10-CM

## 2022-11-13 DIAGNOSIS — E87.20 ACIDOSIS, UNSPECIFIED: ICD-10-CM

## 2022-11-13 PROBLEM — Z29.9 ENCOUNTER FOR PROPHYLACTIC MEASURES, UNSPECIFIED: Chronic | Status: ACTIVE | Noted: 2022-10-12

## 2022-11-13 PROBLEM — I25.10 ATHEROSCLEROTIC HEART DISEASE OF NATIVE CORONARY ARTERY WITHOUT ANGINA PECTORIS: Chronic | Status: ACTIVE | Noted: 2022-10-12

## 2022-11-13 LAB
ALBUMIN SERPL ELPH-MCNC: 4.1 G/DL — SIGNIFICANT CHANGE UP (ref 3.3–5)
ALP SERPL-CCNC: 73 U/L — SIGNIFICANT CHANGE UP (ref 40–120)
ALT FLD-CCNC: 100 U/L — HIGH (ref 4–41)
ANION GAP SERPL CALC-SCNC: 29 MMOL/L — HIGH (ref 7–14)
ANION GAP SERPL CALC-SCNC: >37 MMOL/L — HIGH (ref 7–14)
ANION GAP SERPL CALC-SCNC: SIGNIFICANT CHANGE UP MMOL/L (ref 7–14)
AST SERPL-CCNC: 47 U/L — HIGH (ref 4–40)
B PERT DNA SPEC QL NAA+PROBE: SIGNIFICANT CHANGE UP
B PERT+PARAPERT DNA PNL SPEC NAA+PROBE: SIGNIFICANT CHANGE UP
B-OH-BUTYR SERPL-SCNC: >9 MMOL/L — SIGNIFICANT CHANGE UP (ref 0–0.4)
BASE EXCESS BLDV CALC-SCNC: -14.1 MMOL/L — LOW (ref -2–3)
BASE EXCESS BLDV CALC-SCNC: -21.9 MMOL/L — LOW (ref -2–3)
BASOPHILS # BLD AUTO: 0.03 K/UL — SIGNIFICANT CHANGE UP (ref 0–0.2)
BASOPHILS NFR BLD AUTO: 0.4 % — SIGNIFICANT CHANGE UP (ref 0–2)
BILIRUB SERPL-MCNC: 0.4 MG/DL — SIGNIFICANT CHANGE UP (ref 0.2–1.2)
BLOOD GAS VENOUS COMPREHENSIVE RESULT: SIGNIFICANT CHANGE UP
BORDETELLA PARAPERTUSSIS (RAPRVP): SIGNIFICANT CHANGE UP
BUN SERPL-MCNC: 56 MG/DL — HIGH (ref 7–23)
BUN SERPL-MCNC: 57 MG/DL — HIGH (ref 7–23)
BUN SERPL-MCNC: 57 MG/DL — HIGH (ref 7–23)
C PNEUM DNA SPEC QL NAA+PROBE: SIGNIFICANT CHANGE UP
CA-I SERPL-SCNC: 1.22 MMOL/L — SIGNIFICANT CHANGE UP (ref 1.15–1.33)
CA-I SERPL-SCNC: 1.28 MMOL/L — SIGNIFICANT CHANGE UP (ref 1.15–1.33)
CALCIUM SERPL-MCNC: 10.2 MG/DL — SIGNIFICANT CHANGE UP (ref 8.4–10.5)
CALCIUM SERPL-MCNC: 8.5 MG/DL — SIGNIFICANT CHANGE UP (ref 8.4–10.5)
CALCIUM SERPL-MCNC: 8.5 MG/DL — SIGNIFICANT CHANGE UP (ref 8.4–10.5)
CHLORIDE BLDV-SCNC: 100 MMOL/L — SIGNIFICANT CHANGE UP (ref 96–108)
CHLORIDE BLDV-SCNC: 90 MMOL/L — LOW (ref 96–108)
CHLORIDE BLDV-SCNC: 91 MMOL/L — LOW (ref 96–108)
CHLORIDE SERPL-SCNC: 84 MMOL/L — LOW (ref 98–107)
CHLORIDE SERPL-SCNC: 88 MMOL/L — LOW (ref 98–107)
CHLORIDE SERPL-SCNC: 98 MMOL/L — SIGNIFICANT CHANGE UP (ref 98–107)
CK MB BLD-MCNC: 4.5 % — HIGH (ref 0–2.5)
CK MB BLD-MCNC: 4.6 % — HIGH (ref 0–2.5)
CK MB CFR SERPL CALC: 16.3 NG/ML — HIGH
CK MB CFR SERPL CALC: 16.6 NG/ML — HIGH
CK SERPL-CCNC: 355 U/L — HIGH (ref 30–200)
CK SERPL-CCNC: 371 U/L — HIGH (ref 30–200)
CO2 BLDV-SCNC: 13 MMOL/L — LOW (ref 22–26)
CO2 BLDV-SCNC: 7.4 MMOL/L — LOW (ref 22–26)
CO2 SERPL-SCNC: 10 MMOL/L — CRITICAL LOW (ref 22–31)
CO2 SERPL-SCNC: <7 MMOL/L — CRITICAL LOW (ref 22–31)
CO2 SERPL-SCNC: <7 MMOL/L — CRITICAL LOW (ref 22–31)
CORTIS F PM SERPL-MCNC: 54 UG/DL — HIGH (ref 2.7–10.5)
CREAT SERPL-MCNC: 1.31 MG/DL — HIGH (ref 0.5–1.3)
CREAT SERPL-MCNC: 1.45 MG/DL — HIGH (ref 0.5–1.3)
CREAT SERPL-MCNC: 1.66 MG/DL — HIGH (ref 0.5–1.3)
EGFR: 48 ML/MIN/1.73M2 — LOW
EGFR: 57 ML/MIN/1.73M2 — LOW
EGFR: 64 ML/MIN/1.73M2 — SIGNIFICANT CHANGE UP
EOSINOPHIL # BLD AUTO: 0 K/UL — SIGNIFICANT CHANGE UP (ref 0–0.5)
EOSINOPHIL NFR BLD AUTO: 0 % — SIGNIFICANT CHANGE UP (ref 0–6)
ETHANOL SERPL-MCNC: <10 MG/DL — SIGNIFICANT CHANGE UP
FLUAV SUBTYP SPEC NAA+PROBE: SIGNIFICANT CHANGE UP
FLUBV RNA SPEC QL NAA+PROBE: SIGNIFICANT CHANGE UP
GAS PNL BLDV: 123 MMOL/L — LOW (ref 136–145)
GAS PNL BLDV: 126 MMOL/L — LOW (ref 136–145)
GAS PNL BLDV: 132 MMOL/L — LOW (ref 136–145)
GAS PNL BLDV: SIGNIFICANT CHANGE UP
GLUCOSE BLDC GLUCOMTR-MCNC: 123 MG/DL — HIGH (ref 70–99)
GLUCOSE BLDC GLUCOMTR-MCNC: 161 MG/DL — HIGH (ref 70–99)
GLUCOSE BLDC GLUCOMTR-MCNC: 195 MG/DL — HIGH (ref 70–99)
GLUCOSE BLDC GLUCOMTR-MCNC: 198 MG/DL — HIGH (ref 70–99)
GLUCOSE BLDC GLUCOMTR-MCNC: 230 MG/DL — HIGH (ref 70–99)
GLUCOSE BLDC GLUCOMTR-MCNC: 258 MG/DL — HIGH (ref 70–99)
GLUCOSE BLDC GLUCOMTR-MCNC: 342 MG/DL — HIGH (ref 70–99)
GLUCOSE BLDC GLUCOMTR-MCNC: 367 MG/DL — HIGH (ref 70–99)
GLUCOSE BLDC GLUCOMTR-MCNC: 408 MG/DL — HIGH (ref 70–99)
GLUCOSE BLDV-MCNC: 221 MG/DL — HIGH (ref 70–99)
GLUCOSE BLDV-MCNC: 427 MG/DL — HIGH (ref 70–99)
GLUCOSE BLDV-MCNC: 474 MG/DL — CRITICAL HIGH (ref 70–99)
GLUCOSE SERPL-MCNC: 224 MG/DL — HIGH (ref 70–99)
GLUCOSE SERPL-MCNC: 420 MG/DL — HIGH (ref 70–99)
GLUCOSE SERPL-MCNC: 423 MG/DL — HIGH (ref 70–99)
HADV DNA SPEC QL NAA+PROBE: SIGNIFICANT CHANGE UP
HCO3 BLDV-SCNC: 12 MMOL/L — LOW (ref 22–29)
HCO3 BLDV-SCNC: 7 MMOL/L — CRITICAL LOW (ref 22–29)
HCOV 229E RNA SPEC QL NAA+PROBE: SIGNIFICANT CHANGE UP
HCOV HKU1 RNA SPEC QL NAA+PROBE: SIGNIFICANT CHANGE UP
HCOV NL63 RNA SPEC QL NAA+PROBE: SIGNIFICANT CHANGE UP
HCOV OC43 RNA SPEC QL NAA+PROBE: SIGNIFICANT CHANGE UP
HCT VFR BLD CALC: 50.3 % — HIGH (ref 39–50)
HCT VFR BLDA CALC: 38 % — LOW (ref 39–51)
HCT VFR BLDA CALC: 41 % — SIGNIFICANT CHANGE UP (ref 39–51)
HCT VFR BLDA CALC: 49 % — SIGNIFICANT CHANGE UP (ref 39–51)
HGB BLD CALC-MCNC: 12.8 G/DL — LOW (ref 13–17)
HGB BLD CALC-MCNC: 13.7 G/DL — SIGNIFICANT CHANGE UP (ref 13–17)
HGB BLD CALC-MCNC: 16.2 G/DL — SIGNIFICANT CHANGE UP (ref 13–17)
HGB BLD-MCNC: 15.9 G/DL — SIGNIFICANT CHANGE UP (ref 13–17)
HMPV RNA SPEC QL NAA+PROBE: SIGNIFICANT CHANGE UP
HPIV1 RNA SPEC QL NAA+PROBE: SIGNIFICANT CHANGE UP
HPIV2 RNA SPEC QL NAA+PROBE: SIGNIFICANT CHANGE UP
HPIV3 RNA SPEC QL NAA+PROBE: SIGNIFICANT CHANGE UP
HPIV4 RNA SPEC QL NAA+PROBE: SIGNIFICANT CHANGE UP
IANC: 5.73 K/UL — SIGNIFICANT CHANGE UP (ref 1.8–7.4)
IMM GRANULOCYTES NFR BLD AUTO: 0.4 % — SIGNIFICANT CHANGE UP (ref 0–0.9)
LACTATE BLDV-MCNC: 1.6 MMOL/L — SIGNIFICANT CHANGE UP (ref 0.5–2)
LACTATE BLDV-MCNC: 2.3 MMOL/L — HIGH (ref 0.5–2)
LACTATE BLDV-MCNC: 3.2 MMOL/L — HIGH (ref 0.5–2)
LIDOCAIN IGE QN: 188 U/L — HIGH (ref 7–60)
LYMPHOCYTES # BLD AUTO: 1.3 K/UL — SIGNIFICANT CHANGE UP (ref 1–3.3)
LYMPHOCYTES # BLD AUTO: 17.4 % — SIGNIFICANT CHANGE UP (ref 13–44)
M PNEUMO DNA SPEC QL NAA+PROBE: SIGNIFICANT CHANGE UP
MAGNESIUM SERPL-MCNC: 2.1 MG/DL — SIGNIFICANT CHANGE UP (ref 1.6–2.6)
MAGNESIUM SERPL-MCNC: 2.3 MG/DL — SIGNIFICANT CHANGE UP (ref 1.6–2.6)
MAGNESIUM SERPL-MCNC: 2.5 MG/DL — SIGNIFICANT CHANGE UP (ref 1.6–2.6)
MCHC RBC-ENTMCNC: 30.2 PG — SIGNIFICANT CHANGE UP (ref 27–34)
MCHC RBC-ENTMCNC: 31.6 GM/DL — LOW (ref 32–36)
MCV RBC AUTO: 95.4 FL — SIGNIFICANT CHANGE UP (ref 80–100)
MONOCYTES # BLD AUTO: 0.4 K/UL — SIGNIFICANT CHANGE UP (ref 0–0.9)
MONOCYTES NFR BLD AUTO: 5.3 % — SIGNIFICANT CHANGE UP (ref 2–14)
NEUTROPHILS # BLD AUTO: 5.73 K/UL — SIGNIFICANT CHANGE UP (ref 1.8–7.4)
NEUTROPHILS NFR BLD AUTO: 76.5 % — SIGNIFICANT CHANGE UP (ref 43–77)
NRBC # BLD: 0 /100 WBCS — SIGNIFICANT CHANGE UP (ref 0–0)
NRBC # FLD: 0 K/UL — SIGNIFICANT CHANGE UP (ref 0–0)
NT-PROBNP SERPL-SCNC: 997 PG/ML — HIGH
PCO2 BLDV: 23 MMHG — LOW (ref 42–55)
PCO2 BLDV: 29 MMHG — LOW (ref 42–55)
PCO2 BLDV: <15 MMHG — LOW (ref 42–55)
PH BLDV: 7 — LOW (ref 7.32–7.43)
PH BLDV: 7.07 — LOW (ref 7.32–7.43)
PH BLDV: 7.23 — LOW (ref 7.32–7.43)
PHOSPHATE SERPL-MCNC: 2.6 MG/DL — SIGNIFICANT CHANGE UP (ref 2.5–4.5)
PHOSPHATE SERPL-MCNC: 6.2 MG/DL — HIGH (ref 2.5–4.5)
PHOSPHATE SERPL-MCNC: 9.6 MG/DL — HIGH (ref 2.5–4.5)
PLATELET # BLD AUTO: 172 K/UL — SIGNIFICANT CHANGE UP (ref 150–400)
PO2 BLDV: 171 MMHG — SIGNIFICANT CHANGE UP
PO2 BLDV: 46 MMHG — SIGNIFICANT CHANGE UP
PO2 BLDV: 50 MMHG — SIGNIFICANT CHANGE UP
POTASSIUM BLDV-SCNC: 5.4 MMOL/L — HIGH (ref 3.5–5.1)
POTASSIUM BLDV-SCNC: 6.6 MMOL/L — CRITICAL HIGH (ref 3.5–5.1)
POTASSIUM BLDV-SCNC: 6.8 MMOL/L — CRITICAL HIGH (ref 3.5–5.1)
POTASSIUM SERPL-MCNC: 5.5 MMOL/L — HIGH (ref 3.5–5.3)
POTASSIUM SERPL-MCNC: 6.3 MMOL/L — CRITICAL HIGH (ref 3.5–5.3)
POTASSIUM SERPL-MCNC: 6.9 MMOL/L — CRITICAL HIGH (ref 3.5–5.3)
POTASSIUM SERPL-SCNC: 5.5 MMOL/L — HIGH (ref 3.5–5.3)
POTASSIUM SERPL-SCNC: 6.3 MMOL/L — CRITICAL HIGH (ref 3.5–5.3)
POTASSIUM SERPL-SCNC: 6.9 MMOL/L — CRITICAL HIGH (ref 3.5–5.3)
PROT SERPL-MCNC: 7.1 G/DL — SIGNIFICANT CHANGE UP (ref 6–8.3)
RAPID RVP RESULT: SIGNIFICANT CHANGE UP
RBC # BLD: 5.27 M/UL — SIGNIFICANT CHANGE UP (ref 4.2–5.8)
RBC # FLD: 12.5 % — SIGNIFICANT CHANGE UP (ref 10.3–14.5)
RSV RNA SPEC QL NAA+PROBE: SIGNIFICANT CHANGE UP
RV+EV RNA SPEC QL NAA+PROBE: SIGNIFICANT CHANGE UP
SAO2 % BLDV: 75.7 % — SIGNIFICANT CHANGE UP
SAO2 % BLDV: 77.4 % — SIGNIFICANT CHANGE UP
SAO2 % BLDV: 99.6 % — SIGNIFICANT CHANGE UP
SARS-COV-2 RNA SPEC QL NAA+PROBE: SIGNIFICANT CHANGE UP
SODIUM SERPL-SCNC: 128 MMOL/L — LOW (ref 135–145)
SODIUM SERPL-SCNC: 131 MMOL/L — LOW (ref 135–145)
SODIUM SERPL-SCNC: 137 MMOL/L — SIGNIFICANT CHANGE UP (ref 135–145)
TROPONIN T, HIGH SENSITIVITY RESULT: 17 NG/L — SIGNIFICANT CHANGE UP
TROPONIN T, HIGH SENSITIVITY RESULT: 17 NG/L — SIGNIFICANT CHANGE UP
TROPONIN T, HIGH SENSITIVITY RESULT: 21 NG/L — SIGNIFICANT CHANGE UP
WBC # BLD: 7.49 K/UL — SIGNIFICANT CHANGE UP (ref 3.8–10.5)
WBC # FLD AUTO: 7.49 K/UL — SIGNIFICANT CHANGE UP (ref 3.8–10.5)

## 2022-11-13 PROCEDURE — 93010 ELECTROCARDIOGRAM REPORT: CPT

## 2022-11-13 PROCEDURE — 99291 CRITICAL CARE FIRST HOUR: CPT | Mod: GC,25

## 2022-11-13 PROCEDURE — 76604 US EXAM CHEST: CPT | Mod: 26,GC

## 2022-11-13 PROCEDURE — 99291 CRITICAL CARE FIRST HOUR: CPT

## 2022-11-13 PROCEDURE — 71045 X-RAY EXAM CHEST 1 VIEW: CPT | Mod: 26

## 2022-11-13 PROCEDURE — 93308 TTE F-UP OR LMTD: CPT | Mod: 26,GC

## 2022-11-13 RX ORDER — SODIUM CHLORIDE 9 MG/ML
1 INJECTION, SOLUTION INTRAVENOUS ONCE
Refills: 0 | Status: COMPLETED | OUTPATIENT
Start: 2022-11-13 | End: 2022-11-13

## 2022-11-13 RX ORDER — SODIUM CHLORIDE 9 MG/ML
1000 INJECTION INTRAMUSCULAR; INTRAVENOUS; SUBCUTANEOUS ONCE
Refills: 0 | Status: COMPLETED | OUTPATIENT
Start: 2022-11-13 | End: 2022-11-13

## 2022-11-13 RX ORDER — CALCIUM GLUCONATE 100 MG/ML
1 VIAL (ML) INTRAVENOUS ONCE
Refills: 0 | Status: COMPLETED | OUTPATIENT
Start: 2022-11-13 | End: 2022-11-13

## 2022-11-13 RX ORDER — FOLIC ACID 0.8 MG
1 TABLET ORAL DAILY
Refills: 0 | Status: DISCONTINUED | OUTPATIENT
Start: 2022-11-13 | End: 2022-11-14

## 2022-11-13 RX ORDER — CHLORHEXIDINE GLUCONATE 213 G/1000ML
1 SOLUTION TOPICAL
Refills: 0 | Status: DISCONTINUED | OUTPATIENT
Start: 2022-11-13 | End: 2022-11-15

## 2022-11-13 RX ORDER — INSULIN HUMAN 100 [IU]/ML
1 INJECTION, SOLUTION SUBCUTANEOUS
Qty: 100 | Refills: 0 | Status: DISCONTINUED | OUTPATIENT
Start: 2022-11-13 | End: 2022-11-14

## 2022-11-13 RX ORDER — SODIUM CHLORIDE 9 MG/ML
1000 INJECTION, SOLUTION INTRAVENOUS
Refills: 0 | Status: DISCONTINUED | OUTPATIENT
Start: 2022-11-13 | End: 2022-11-14

## 2022-11-13 RX ORDER — ASPIRIN/CALCIUM CARB/MAGNESIUM 324 MG
81 TABLET ORAL DAILY
Refills: 0 | Status: DISCONTINUED | OUTPATIENT
Start: 2022-11-13 | End: 2022-11-18

## 2022-11-13 RX ORDER — PIPERACILLIN AND TAZOBACTAM 4; .5 G/20ML; G/20ML
3.38 INJECTION, POWDER, LYOPHILIZED, FOR SOLUTION INTRAVENOUS ONCE
Refills: 0 | Status: COMPLETED | OUTPATIENT
Start: 2022-11-13 | End: 2022-11-13

## 2022-11-13 RX ORDER — SODIUM CHLORIDE 9 MG/ML
2000 INJECTION, SOLUTION INTRAVENOUS ONCE
Refills: 0 | Status: COMPLETED | OUTPATIENT
Start: 2022-11-13 | End: 2022-11-13

## 2022-11-13 RX ORDER — PANTOPRAZOLE SODIUM 20 MG/1
40 TABLET, DELAYED RELEASE ORAL DAILY
Refills: 0 | Status: DISCONTINUED | OUTPATIENT
Start: 2022-11-13 | End: 2022-11-14

## 2022-11-13 RX ORDER — SODIUM BICARBONATE 1 MEQ/ML
50 SYRINGE (ML) INTRAVENOUS ONCE
Refills: 0 | Status: COMPLETED | OUTPATIENT
Start: 2022-11-13 | End: 2022-11-13

## 2022-11-13 RX ORDER — SODIUM ZIRCONIUM CYCLOSILICATE 10 G/10G
10 POWDER, FOR SUSPENSION ORAL ONCE
Refills: 0 | Status: COMPLETED | OUTPATIENT
Start: 2022-11-13 | End: 2022-11-13

## 2022-11-13 RX ORDER — HEPARIN SODIUM 5000 [USP'U]/ML
5000 INJECTION INTRAVENOUS; SUBCUTANEOUS EVERY 8 HOURS
Refills: 0 | Status: DISCONTINUED | OUTPATIENT
Start: 2022-11-13 | End: 2022-11-16

## 2022-11-13 RX ORDER — THIAMINE MONONITRATE (VIT B1) 100 MG
100 TABLET ORAL DAILY
Refills: 0 | Status: DISCONTINUED | OUTPATIENT
Start: 2022-11-13 | End: 2022-11-13

## 2022-11-13 RX ORDER — SODIUM CHLORIDE 9 MG/ML
1000 INJECTION, SOLUTION INTRAVENOUS ONCE
Refills: 0 | Status: COMPLETED | OUTPATIENT
Start: 2022-11-13 | End: 2022-11-13

## 2022-11-13 RX ORDER — TICAGRELOR 90 MG/1
90 TABLET ORAL EVERY 12 HOURS
Refills: 0 | Status: DISCONTINUED | OUTPATIENT
Start: 2022-11-13 | End: 2022-11-18

## 2022-11-13 RX ORDER — FOLIC ACID 0.8 MG
1 TABLET ORAL DAILY
Refills: 0 | Status: DISCONTINUED | OUTPATIENT
Start: 2022-11-13 | End: 2022-11-13

## 2022-11-13 RX ORDER — THIAMINE MONONITRATE (VIT B1) 100 MG
100 TABLET ORAL DAILY
Refills: 0 | Status: DISCONTINUED | OUTPATIENT
Start: 2022-11-13 | End: 2022-11-14

## 2022-11-13 RX ORDER — CALCIUM GLUCONATE 100 MG/ML
2 VIAL (ML) INTRAVENOUS ONCE
Refills: 0 | Status: COMPLETED | OUTPATIENT
Start: 2022-11-13 | End: 2022-11-13

## 2022-11-13 RX ORDER — VANCOMYCIN HCL 1 G
1000 VIAL (EA) INTRAVENOUS ONCE
Refills: 0 | Status: COMPLETED | OUTPATIENT
Start: 2022-11-13 | End: 2022-11-13

## 2022-11-13 RX ORDER — DIAZEPAM 5 MG
10 TABLET ORAL ONCE
Refills: 0 | Status: DISCONTINUED | OUTPATIENT
Start: 2022-11-13 | End: 2022-11-13

## 2022-11-13 RX ADMIN — HEPARIN SODIUM 5000 UNIT(S): 5000 INJECTION INTRAVENOUS; SUBCUTANEOUS at 21:13

## 2022-11-13 RX ADMIN — Medication 1 GRAM(S): at 14:09

## 2022-11-13 RX ADMIN — SODIUM ZIRCONIUM CYCLOSILICATE 10 GRAM(S): 10 POWDER, FOR SUSPENSION ORAL at 17:55

## 2022-11-13 RX ADMIN — Medication 81 MILLIGRAM(S): at 17:22

## 2022-11-13 RX ADMIN — SODIUM CHLORIDE 100 MILLILITER(S): 9 INJECTION, SOLUTION INTRAVENOUS at 20:08

## 2022-11-13 RX ADMIN — PIPERACILLIN AND TAZOBACTAM 200 GRAM(S): 4; .5 INJECTION, POWDER, LYOPHILIZED, FOR SOLUTION INTRAVENOUS at 13:37

## 2022-11-13 RX ADMIN — Medication 1 MILLIGRAM(S): at 17:22

## 2022-11-13 RX ADMIN — SODIUM CHLORIDE 1000 MILLILITER(S): 9 INJECTION INTRAMUSCULAR; INTRAVENOUS; SUBCUTANEOUS at 14:37

## 2022-11-13 RX ADMIN — SODIUM CHLORIDE 1 MILLILITER(S): 9 INJECTION, SOLUTION INTRAVENOUS at 14:42

## 2022-11-13 RX ADMIN — SODIUM CHLORIDE 1000 MILLILITER(S): 9 INJECTION INTRAMUSCULAR; INTRAVENOUS; SUBCUTANEOUS at 13:37

## 2022-11-13 RX ADMIN — Medication 250 MILLIGRAM(S): at 13:38

## 2022-11-13 RX ADMIN — SODIUM CHLORIDE 100 MILLILITER(S): 9 INJECTION, SOLUTION INTRAVENOUS at 19:29

## 2022-11-13 RX ADMIN — SODIUM CHLORIDE 1000 MILLILITER(S): 9 INJECTION, SOLUTION INTRAVENOUS at 15:17

## 2022-11-13 RX ADMIN — CHLORHEXIDINE GLUCONATE 1 APPLICATION(S): 213 SOLUTION TOPICAL at 18:44

## 2022-11-13 RX ADMIN — TICAGRELOR 90 MILLIGRAM(S): 90 TABLET ORAL at 18:24

## 2022-11-13 RX ADMIN — Medication 10 MILLIGRAM(S): at 13:37

## 2022-11-13 RX ADMIN — Medication 100 MILLIGRAM(S): at 17:22

## 2022-11-13 RX ADMIN — SODIUM CHLORIDE 1 MILLILITER(S): 9 INJECTION, SOLUTION INTRAVENOUS at 15:16

## 2022-11-13 RX ADMIN — Medication 50 MILLIEQUIVALENT(S): at 18:27

## 2022-11-13 RX ADMIN — Medication 30 MILLILITER(S): at 17:55

## 2022-11-13 RX ADMIN — Medication 1 TABLET(S): at 17:22

## 2022-11-13 RX ADMIN — PIPERACILLIN AND TAZOBACTAM 3.38 GRAM(S): 4; .5 INJECTION, POWDER, LYOPHILIZED, FOR SOLUTION INTRAVENOUS at 14:07

## 2022-11-13 RX ADMIN — Medication 100 GRAM(S): at 13:39

## 2022-11-13 RX ADMIN — Medication 1000 MILLIGRAM(S): at 14:38

## 2022-11-13 RX ADMIN — INSULIN HUMAN 6 UNIT(S)/HR: 100 INJECTION, SOLUTION SUBCUTANEOUS at 16:07

## 2022-11-13 RX ADMIN — Medication 200 GRAM(S): at 17:55

## 2022-11-13 RX ADMIN — SODIUM CHLORIDE 1000 MILLILITER(S): 9 INJECTION INTRAMUSCULAR; INTRAVENOUS; SUBCUTANEOUS at 18:27

## 2022-11-13 RX ADMIN — INSULIN HUMAN 8 UNIT(S)/HR: 100 INJECTION, SOLUTION SUBCUTANEOUS at 20:08

## 2022-11-13 NOTE — ED PROVIDER NOTE - CHIEF COMPLAINT
The patient is a 57y Male complaining of 
Please follow up with your OB on Monday/Tuesday next week.

## 2022-11-13 NOTE — ED ADULT NURSE NOTE - INTERVENTIONS DEFINITIONS
[place] to call system/Call bell, personal items and telephone within reach/Instruct patient to call for assistance/Non-slip footwear when patient is off stretcher/Physically safe environment: no spills, clutter or unnecessary equipment/Stretcher in lowest position, wheels locked, appropriate side rails in place/Monitor for mental status changes and reorient to person, place, and time

## 2022-11-13 NOTE — H&P ADULT - ATTENDING COMMENTS
Patient here with weakness, n/v/abd pain found to have HAGMA/HOA/lactic acidosis due to DKA    Will start insulin gtt, q1h finger sticks  c/w IVF  monitor for hypokalemia and hypophosphatemia   profound acidosis related to dka, if persistently hyperkalemic may need hco3  concern for etoh withdrawal - check level, precedex if needed  has at least mild LV dysfunction, check cardiac enzymes, f/u repeat EKG    POCUS: mild lv dysfunction  DVT ppx: hsq  GOC: full code

## 2022-11-13 NOTE — H&P ADULT - NSHPREVIEWOFSYSTEMS_GEN_ALL_CORE
General: + dizziness, fatigue  ENMT: Denies rhinorrhea  Respiratory: Denies cough, SOB  Cardiovascular: Denies palpitations, CP  Gastrointestinal: +abd pain, +N/V, hematochezia, melena  Endocrine: Denies increased thirst, increased frequency  Neuro: Denies weakness, numbness  Psych: Denies anxiety, depression  All ROS negative unless indicated above

## 2022-11-13 NOTE — ED PROVIDER NOTE - PHYSICAL EXAMINATION
GENERAL: Tremulous, diaphoretic pale  HEENT: atraumatic, normocephalic, Vision grossly intact, no conjunctivitis or discharge, hearing grossly intact,  no nasal discharge, epistaxis   CARDIAC: tachy, regular, normal S1S2,  no appreciable murmurs, no cyanosis, cap refill < 2 seconds  PULM: tachypneic, oxygen saturation on RA wnl, CTAB, no crackles, rales, rhonchi, or wheezing  GI: abdomen nondistended, soft, nontender, no guarding or rebound tenderness, no palpable masses  NEURO: awake and alert, tremulous without asterixis or clonus, follows commands, normal speech, PERRLA, EOMI, no focal motor or sensory deficits  MSK: spine appears normal, no joint swelling or erythema, ranging all extremities with no appreciable loss of ROM  EXT: no peripheral edema, calf tenderness, redness or swelling  SKIN: diaphoretic, warm, dry, and intact, no rashes  PSYCH: appropriate mood and affect

## 2022-11-13 NOTE — ED ADULT NURSE NOTE - OBJECTIVE STATEMENT
Received patient in room 15 c/o difficulty breathing x 3 days associated w/abdominal discomfort, decreased PO intake, weakness. PMHX of ETHO abuse, DM, HTN, CAD. Patient is on cardiac monitor BP low, MD made aware, O2 sat 98% on a non rebreather mask, sinus tachycardia on the monitor. Patient is A&OX2, lethargic, airway patent, breathing unlabored and even, radial pulses palpable, abdomen soft, nontender. Side rails up and safety maintained. Fall precaution in place.

## 2022-11-13 NOTE — ED PROVIDER NOTE - NSICDXPASTMEDICALHX_GEN_ALL_CORE_FT
PAST MEDICAL HISTORY:  CAD (coronary artery disease)     Diabetes     EtOH dependence     HTN (hypertension)     Prophylactic measure     Transient cerebral ischemic attack

## 2022-11-13 NOTE — H&P ADULT - ASSESSMENT
58yo M pmh HTN, T2DM (on insulin, trulicity), CAD (s/p PCI on brillinta), EtOH overuse p/w - Presenting to the ED with 3 to 4 days of general malaise , ABD pain, and inability to tolerate p.o.  Patient reports symptoms have been gradual in onset.  Last alcoholic drink was 4 days ago (Tuesday). He ususally drinks a pint of liquor on the weekends. Has not been hospitalized for Etoh withdrawal.  Has abdominal discomfort throughout the abdomen with no associated change in bowel move    #NEURO      #RESPIRATORY      #CARDIOVASCULAR       #GI      #RENAL/      #ID      #HEME      #ENDO      #ETHICS      #LINES/TUBES/FERNANDEZ  56yo M pmh HTN, T2DM (on insulin, trulicity), CAD (s/p PCI on brillinta), EtOH overuse p/w - Presenting to the ED with 3 to 4 days of general malaise , ABD pain, and inability to tolerate p.o. Admitted to MICU for DKA and concern for etoh wth.     #NEURO  - No acute issues, patient is AOx3     #RESPIRATORY  - No acute issues, patient on RA     #CARDIOVASCULAR   //CAD  - Patient with hx of PCI on ASA and brillinta at home   - C/w ASA, C/w Brillinta       #GI  - NPO Barrera now while being treated for DKA   - Patient with ABD pain and elevated lipase, would obtain CT A/P.       #RENAL/  //Electrolyte Derrangement   - Continue to monitor BMP q4 while on insulin gtt and replete as needed    #ID  //SIRS   - Patient is hypothermic, tachycardic and hypotensive in ED   - S/P Vanc & ZOsyn     #HEME      #ENDO      #ETHICS      #LINES/TUBES/FERNANDEZ  56yo M pmh HTN, T2DM (on insulin, trulicity), CAD (s/p PCI on brillinta), EtOH overuse p/w - Presenting to the ED with 3 to 4 days of general malaise , ABD pain, and inability to tolerate p.o. Admitted to MICU for DKA and concern for etoh wth.     #NEURO  - No acute issues, patient is AOx3     #RESPIRATORY  - No acute issues, patient on RA     #CARDIOVASCULAR   //CAD  - Patient with hx of PCI on ASA and brillinta at home   - C/w ASA, C/w Brillinta       #GI  - NPO Barrera now while being treated for DKA   - Patient with ABD pain and elevated lipase, would obtain CT A/P.       #RENAL/  //Electrolyte Derrangement   - Continue to monitor BMP q4 while on insulin gtt and replete as needed    #ID  //SIRS   - Patient is hypothermic, tachycardic and hypotensive in ED   - S/P Vanc & ZOsyn; 2L IVF Bolus   - C/w Zosyn   - F/U Bcx and infectious w/u    - F/U CT A/P     #HEME  - No acute issues     #ENDO  //DKA   -    #ETHICS  - Full code      56yo M pmh HTN, T2DM (on insulin, trulicity), CAD (s/p PCI on brillinta), EtOH overuse p/w - Presenting to the ED with 3 to 4 days of general malaise , ABD pain, and inability to tolerate p.o. Admitted to MICU for DKA and concern for etoh wth.     #NEURO  - No acute issues, patient is AOx3     #RESPIRATORY  - No acute issues, patient on RA     #CARDIOVASCULAR   //CAD  - Patient with hx of PCI on ASA and brillinta at home   - C/w ASA, C/w Brillinta       #GI  - NPO Barrera now while being treated for DKA   - Patient with ABD pain and elevated lipase, would obtain CT A/P.       #RENAL/  //Electrolyte Derrangement   - Continue to monitor BMP q4 while on insulin gtt and replete as needed    #ID  //SIRS   - Patient is hypothermic, tachycardic and hypotensive in ED   - S/P Vanc & ZOsyn; 2L IVF Bolus   - C/w Zosyn   - F/U Bcx and infectious w/u    - F/U CT A/P     #HEME  - No acute issues     #ENDO  //DKA   - POCT    -- pH 7.00/13/171. GAP > 37, Bicarb < 7; BHB: > 9   - s/p 2L IVF Bolus   > insulin gtt 6U/hr -- continue until HCO3 > 18  > Start D5 + LR once POCTFS < 250   > BMP & VBG q4h, replete lytes as needed (K, Phos)  > FS q1h  - Endo consult     #ETHICS  - Full code      58yo M pmh HTN, T2DM (on insulin, trulicity), CAD (s/p PCI on brillinta), EtOH overuse p/w - Presenting to the ED with 3 to 4 days of general malaise , ABD pain, and inability to tolerate p.o. Admitted to MICU for DKA and concern for etoh wth.     #NEURO  - No acute issues, patient is AOx3     #RESPIRATORY  - No acute issues, patient on RA     #CARDIOVASCULAR   //CAD  - Patient with hx of PCI on ASA and brillinta at home   - C/w ASA, C/w Brillinta   - C/w lipitor       #GI  - NPO Barrera now while being treated for DKA   - Patient with ABD pain and elevated lipase, would obtain CT A/P.       #RENAL/  //Electrolyte Derrangement   - Continue to monitor BMP q4 while on insulin gtt and replete as needed    #ID  //SIRS   - Patient is hypothermic, tachycardic and hypotensive in ED   - S/P Vanc & ZOsyn; 2L IVF Bolus   - C/w Zosyn   - F/U Bcx and infectious w/u    - F/U CT A/P     #HEME  - No acute issues     #ENDO  //T2DM - DKA   - POCT    -- pH 7.00/13/171. GAP > 37, Bicarb < 7; BHB: > 9   - s/p 2L IVF Bolus   > insulin gtt 6U/hr -- continue until HCO3 > 18  > Start D5 + LR once POCTFS < 250   > BMP & VBG q4h, replete lytes as needed (K, Phos)  > FS q1h  - Endo consult   - Of note , documentation shows patient was discharged with Afrezza for DM, but patient states he does not take it. He only takes Basaglar and Trulicity at home     #ETHICS  - Full code

## 2022-11-13 NOTE — ED PROVIDER NOTE - ATTENDING CONTRIBUTION TO CARE
The patient is a 57y Male non smoker who has a past medical and surgery history of HTN DM HTN ETOH dependence TIA CAD/STEMI/9/2022/asa/brillinta, loop recorder PTED tachypnic with SOB  3 days decreased appetite. weakness and dizziness fs 309 On drinking binge over past two weeks last drink Thursday =bottle of whiskey no PO since   Vital Signs Last 24 Hrs  T(F): 97.5 HR: 100 BP: 85/54 RR: 24 SpO2: 97% fs 309 (13 Nov 2022 12:21)   PE: NCAT Dry MM clear lungs hyperventilating otherwise clear lungs heart tachy but with no MCRG abdomen soft no HSM extremities no CCE neuro appears fatigued AO4 No focal M/S Deficits   DATA:  EKG: as transmitted prior EKG showed NSR ? LAE LAD   Possible Left atrial enlargement  Left axis deviation  Abnormal ECG    LAB: incomplete results at time of eval  DATA:  EKG: NST@148 short DELVIS (90ms ) frequent PVCs and fusion complex Peaked T waves especially in precordial leads LAFB pending at time of evaluation  LAB:   pH7.00 pCO2<15 undetectable CO2    IMPRESSION/RISK:  Dx=  Severe met acidosis in pt with multiple factors for same   Consideration include DKA +/- AKA with starvation; unclear how much pancreatitis (lipase 188) would also contribute and whether severity of disease to point where patient would only mount mild elevations   Plan  calcium gluconate IVPB 1 Gram(s) IV Intermittent  diazepam  Injectable 10 milliGRAM(s) IV Push  piperacillin/tazobactam IVPB... 3.375 Gram(s) IV Intermittent  aggressive IVHydration will attempt to obtain normosol and add insulin once K known pressors prn   look for other precipitants CXR urine serial abdominal exams   labs followup met panel with mg phos trop bnp urine   ICU consult TBA for monitoring setting   sodium chloride 0.9% Bolus 1000 milliLiter(s) IV Bolus  vancomycin  IVPB. 1000 milliGRAM(s) IV Intermittent

## 2022-11-13 NOTE — H&P ADULT - NSHPPHYSICALEXAM_GEN_ALL_CORE
Vital Signs Last 24 Hrs  T(C): 34.4 (13 Nov 2022 15:29), Max: 36.4 (13 Nov 2022 12:21)  T(F): 93.9 (13 Nov 2022 15:29), Max: 97.5 (13 Nov 2022 12:21)  HR: 98 (13 Nov 2022 15:29) (98 - 100)  BP: 104/68 (13 Nov 2022 15:29) (85/54 - 117/96)  BP(mean): --  RR: 22 (13 Nov 2022 15:29) (22 - 24)  SpO2: 100% (13 Nov 2022 15:29) (97% - 100%)    Parameters below as of 13 Nov 2022 15:29  Patient On (Oxygen Delivery Method): nasal cannula        CONSTITUTIONAL: Tachypneic; Ill-appearing; in discomfort   HEENT:  clear conjunctiva  RESPIRATORY: normal respiratory effort; lungs are clear to auscultation bilaterally; No Crackles/Rhonchi/Wheezing  CARDIOVASCULAR: Sinus Tach; normal rhythm, normal S1 and S2, no murmur/rub/gallop; No lower extremity edema; Peripheral pulses are 2+ bilaterally  ABDOMEN: Nontender to Palpation; normoactive bowel sounds, no rebound/guarding; No hepatosplenomegaly  EXTREMITY: Lower extremities Non-tender to palpation; non-erythematous B/L  NEURO: A&Ox3; no focal deficits   PSYCH: normal mood; Affect appropirate

## 2022-11-13 NOTE — ED PROVIDER NOTE - OBJECTIVE STATEMENT
56yo M pmh HTN, T2DM (on insulin, trulicity), CAD (s/p PCI on brillinta), EtOH overuse p/w - Presenting to the ED with 3 to 4 days of general malaise and inability to tolerate p.o. now with shortness of breath since yesterday.  Patient reports symptoms have been gradual in onset.  Last alcoholic drink was 4 days ago.  Has mild abdominal discomfort throughout the abdomen with no associated change in bowel movements or urine symptoms.  Denies chest pain.  Shortness of breath is constant even at rest.  No lower extremity swelling recent long travel.  He has been compliant with his Brilinta and other medications.

## 2022-11-13 NOTE — ED PROVIDER NOTE - CLINICAL SUMMARY MEDICAL DECISION MAKING FREE TEXT BOX
History of diabetes and CAD presenting now with days of decreased p.o. intake nausea and now shortness of breath.  No chest pain.  Physical exam remarkable for tachycardia tachypnea without hypoxia,diaphoresis, non-tender abdomen, hypothermia.  Patient sick appearing.  IV access cardiac monitor.  Differential is broad includes sepsis.  Meets SIRS criteria will give antibiotics and sepsis on board.  Cannot rule out ACS given his medical history may have infarction.  EKG with no acute T waves in V1 and V2 no other leads.  May also be representative of hyperkalemia.  Will give calcium gluconate to address checking function.  Cannot rule out hypothyroidism.

## 2022-11-13 NOTE — ED PROVIDER NOTE - NSICDXFAMILYHX_GEN_ALL_CORE_FT
FAMILY HISTORY:  Mother  Still living? Unknown  FH: diabetes mellitus, Age at diagnosis: Age Unknown  FH: stroke, Age at diagnosis: Age Unknown

## 2022-11-13 NOTE — CHART NOTE - NSCHARTNOTEFT_GEN_A_CORE
: bruce    INDICATION: dka    PROCEDURE:  x[ ] LIMITED ECHO  [x ] LIMITED CHEST  [ ] LIMITED RETROPERITONEAL  [ ] LIMITED ABDOMINAL  [ ] LIMITED DVT  [ ] NEEDLE GUIDANCE VASCULAR  [ ] NEEDLE GUIDANCE THORACENTESIS  [ ] NEEDLE GUIDANCE PARACENTESIS  [ ] NEEDLE GUIDANCE PERICARDIOCENTESIS  [ ] OTHER    FINDINGS:  mild lv systolic dysfunction  RV< LV size  a line pattern bilaterally    INTERPRETATION:  mild lv systolic dysfunction  normal aeration pattern  Images stored on [employer].    Attending Attestation:  I was present during the key portions of the procedure and immediately available during the entire procedure.  Jaguar Williamson MD  Attending  Pulmonary & Critical Care Medicine.

## 2022-11-13 NOTE — ED ADULT NURSE NOTE - CHIEF COMPLAINT QUOTE
diff breathing, x 3 days ,decreased appetite. denies pain. c/o weakness. appears tachypneic with c/o dizziness x 3 days  fs 309

## 2022-11-13 NOTE — H&P ADULT - HISTORY OF PRESENT ILLNESS
56yo M pmh HTN, T2DM (on insulin, trulicity), CAD (s/p PCI on brillinta), EtOH overuse p/w - Presenting to the ED with 3 to 4 days of general malaise , ABD pain, and inability to tolerate p.o.  Patient reports symptoms have been gradual in onset.  Last alcoholic drink was 4 days ago (Tuesday). He ususally drinks a pint of liquor on the weekends. Has not been hospitalized for Etoh withdrawal.  Has abdominal discomfort throughout the abdomen with no associated change in bowel movements or urine symptoms. He says he has been compliant with his DM medications (He takes Basaglar and Trulicity). Denies SOB, CP, Diarrhea, melena, hematochezia, hematemesis     In ED: Received Calcium gluc, Valium 10, Zosyn, Vanc, 2L Bolus IVF

## 2022-11-13 NOTE — ED ADULT TRIAGE NOTE - CHIEF COMPLAINT QUOTE
diff breathing, x 3 days ,decreased appetite. denies pain. c/o weakness. appears tachypneic with c/o dizziness x 3 days  fs 309 unable to get pulse ox diff breathing, x 3 days ,decreased appetite. denies pain. c/o weakness. appears tachypneic with c/o dizziness x 3 days  fs 309

## 2022-11-14 DIAGNOSIS — K85.90 ACUTE PANCREATITIS WITHOUT NECROSIS OR INFECTION, UNSPECIFIED: ICD-10-CM

## 2022-11-14 DIAGNOSIS — E11.65 TYPE 2 DIABETES MELLITUS WITH HYPERGLYCEMIA: ICD-10-CM

## 2022-11-14 DIAGNOSIS — E11.10 TYPE 2 DIABETES MELLITUS WITH KETOACIDOSIS WITHOUT COMA: ICD-10-CM

## 2022-11-14 LAB
ALBUMIN SERPL ELPH-MCNC: 3.1 G/DL — LOW (ref 3.3–5)
ALP SERPL-CCNC: 43 U/L — SIGNIFICANT CHANGE UP (ref 40–120)
ALT FLD-CCNC: 66 U/L — HIGH (ref 4–41)
ANION GAP SERPL CALC-SCNC: 11 MMOL/L — SIGNIFICANT CHANGE UP (ref 7–14)
ANION GAP SERPL CALC-SCNC: 14 MMOL/L — SIGNIFICANT CHANGE UP (ref 7–14)
AST SERPL-CCNC: 31 U/L — SIGNIFICANT CHANGE UP (ref 4–40)
BASE EXCESS BLDV CALC-SCNC: -0.4 MMOL/L — SIGNIFICANT CHANGE UP (ref -2–3)
BILIRUB DIRECT SERPL-MCNC: <0.2 MG/DL — SIGNIFICANT CHANGE UP (ref 0–0.3)
BILIRUB INDIRECT FLD-MCNC: >0.3 MG/DL — SIGNIFICANT CHANGE UP (ref 0–1)
BILIRUB SERPL-MCNC: 0.5 MG/DL — SIGNIFICANT CHANGE UP (ref 0.2–1.2)
BLOOD GAS VENOUS COMPREHENSIVE RESULT: SIGNIFICANT CHANGE UP
BUN SERPL-MCNC: 45 MG/DL — HIGH (ref 7–23)
BUN SERPL-MCNC: 51 MG/DL — HIGH (ref 7–23)
CA-I SERPL-SCNC: 1.24 MMOL/L — SIGNIFICANT CHANGE UP (ref 1.15–1.33)
CALCIUM SERPL-MCNC: 8.6 MG/DL — SIGNIFICANT CHANGE UP (ref 8.4–10.5)
CALCIUM SERPL-MCNC: 8.7 MG/DL — SIGNIFICANT CHANGE UP (ref 8.4–10.5)
CHLORIDE BLDV-SCNC: 107 MMOL/L — SIGNIFICANT CHANGE UP (ref 96–108)
CHLORIDE SERPL-SCNC: 105 MMOL/L — SIGNIFICANT CHANGE UP (ref 98–107)
CHLORIDE SERPL-SCNC: 106 MMOL/L — SIGNIFICANT CHANGE UP (ref 98–107)
CO2 BLDV-SCNC: 27.9 MMOL/L — HIGH (ref 22–26)
CO2 SERPL-SCNC: 20 MMOL/L — LOW (ref 22–31)
CO2 SERPL-SCNC: 22 MMOL/L — SIGNIFICANT CHANGE UP (ref 22–31)
CREAT SERPL-MCNC: 0.99 MG/DL — SIGNIFICANT CHANGE UP (ref 0.5–1.3)
CREAT SERPL-MCNC: 1.1 MG/DL — SIGNIFICANT CHANGE UP (ref 0.5–1.3)
EGFR: 79 ML/MIN/1.73M2 — SIGNIFICANT CHANGE UP
EGFR: 90 ML/MIN/1.73M2 — SIGNIFICANT CHANGE UP
GAS PNL BLDV: 137 MMOL/L — SIGNIFICANT CHANGE UP (ref 136–145)
GAS PNL BLDV: SIGNIFICANT CHANGE UP
GLUCOSE BLDC GLUCOMTR-MCNC: 106 MG/DL — HIGH (ref 70–99)
GLUCOSE BLDC GLUCOMTR-MCNC: 108 MG/DL — HIGH (ref 70–99)
GLUCOSE BLDC GLUCOMTR-MCNC: 112 MG/DL — HIGH (ref 70–99)
GLUCOSE BLDC GLUCOMTR-MCNC: 113 MG/DL — HIGH (ref 70–99)
GLUCOSE BLDC GLUCOMTR-MCNC: 139 MG/DL — HIGH (ref 70–99)
GLUCOSE BLDC GLUCOMTR-MCNC: 140 MG/DL — HIGH (ref 70–99)
GLUCOSE BLDC GLUCOMTR-MCNC: 141 MG/DL — HIGH (ref 70–99)
GLUCOSE BLDC GLUCOMTR-MCNC: 149 MG/DL — HIGH (ref 70–99)
GLUCOSE BLDC GLUCOMTR-MCNC: 167 MG/DL — HIGH (ref 70–99)
GLUCOSE BLDC GLUCOMTR-MCNC: 185 MG/DL — HIGH (ref 70–99)
GLUCOSE BLDC GLUCOMTR-MCNC: 232 MG/DL — HIGH (ref 70–99)
GLUCOSE BLDC GLUCOMTR-MCNC: 307 MG/DL — HIGH (ref 70–99)
GLUCOSE BLDC GLUCOMTR-MCNC: 55 MG/DL — LOW (ref 70–99)
GLUCOSE BLDC GLUCOMTR-MCNC: 66 MG/DL — LOW (ref 70–99)
GLUCOSE BLDC GLUCOMTR-MCNC: 95 MG/DL — SIGNIFICANT CHANGE UP (ref 70–99)
GLUCOSE BLDV-MCNC: 130 MG/DL — HIGH (ref 70–99)
GLUCOSE SERPL-MCNC: 131 MG/DL — HIGH (ref 70–99)
GLUCOSE SERPL-MCNC: 65 MG/DL — LOW (ref 70–99)
HCO3 BLDV-SCNC: 26 MMOL/L — SIGNIFICANT CHANGE UP (ref 22–29)
HCT VFR BLD CALC: 35.7 % — LOW (ref 39–50)
HCT VFR BLDA CALC: 36 % — LOW (ref 39–51)
HGB BLD CALC-MCNC: 12 G/DL — LOW (ref 13–17)
HGB BLD-MCNC: 12.1 G/DL — LOW (ref 13–17)
LACTATE BLDV-MCNC: 0.9 MMOL/L — SIGNIFICANT CHANGE UP (ref 0.5–2)
LIDOCAIN IGE QN: 378 U/L — HIGH (ref 7–60)
MAGNESIUM SERPL-MCNC: 2 MG/DL — SIGNIFICANT CHANGE UP (ref 1.6–2.6)
MAGNESIUM SERPL-MCNC: 2 MG/DL — SIGNIFICANT CHANGE UP (ref 1.6–2.6)
MCHC RBC-ENTMCNC: 30.4 PG — SIGNIFICANT CHANGE UP (ref 27–34)
MCHC RBC-ENTMCNC: 33.9 GM/DL — SIGNIFICANT CHANGE UP (ref 32–36)
MCV RBC AUTO: 89.7 FL — SIGNIFICANT CHANGE UP (ref 80–100)
MRSA PCR RESULT.: SIGNIFICANT CHANGE UP
NRBC # BLD: 0 /100 WBCS — SIGNIFICANT CHANGE UP (ref 0–0)
NRBC # FLD: 0 K/UL — SIGNIFICANT CHANGE UP (ref 0–0)
PCO2 BLDV: 51 MMHG — SIGNIFICANT CHANGE UP (ref 42–55)
PH BLDV: 7.32 — SIGNIFICANT CHANGE UP (ref 7.32–7.43)
PHOSPHATE SERPL-MCNC: 1.5 MG/DL — LOW (ref 2.5–4.5)
PHOSPHATE SERPL-MCNC: 2.2 MG/DL — LOW (ref 2.5–4.5)
PLATELET # BLD AUTO: 102 K/UL — LOW (ref 150–400)
PO2 BLDV: 41 MMHG — SIGNIFICANT CHANGE UP
POTASSIUM BLDV-SCNC: 4.6 MMOL/L — SIGNIFICANT CHANGE UP (ref 3.5–5.1)
POTASSIUM SERPL-MCNC: 4.4 MMOL/L — SIGNIFICANT CHANGE UP (ref 3.5–5.3)
POTASSIUM SERPL-MCNC: 4.6 MMOL/L — SIGNIFICANT CHANGE UP (ref 3.5–5.3)
POTASSIUM SERPL-SCNC: 4.4 MMOL/L — SIGNIFICANT CHANGE UP (ref 3.5–5.3)
POTASSIUM SERPL-SCNC: 4.6 MMOL/L — SIGNIFICANT CHANGE UP (ref 3.5–5.3)
PROT SERPL-MCNC: 5 G/DL — LOW (ref 6–8.3)
RBC # BLD: 3.98 M/UL — LOW (ref 4.2–5.8)
RBC # FLD: 12.1 % — SIGNIFICANT CHANGE UP (ref 10.3–14.5)
S AUREUS DNA NOSE QL NAA+PROBE: DETECTED
SAO2 % BLDV: 71.9 % — SIGNIFICANT CHANGE UP
SODIUM SERPL-SCNC: 139 MMOL/L — SIGNIFICANT CHANGE UP (ref 135–145)
SODIUM SERPL-SCNC: 139 MMOL/L — SIGNIFICANT CHANGE UP (ref 135–145)
TRIGL SERPL-MCNC: 94 MG/DL — SIGNIFICANT CHANGE UP
WBC # BLD: 5.31 K/UL — SIGNIFICANT CHANGE UP (ref 3.8–10.5)
WBC # FLD AUTO: 5.31 K/UL — SIGNIFICANT CHANGE UP (ref 3.8–10.5)

## 2022-11-14 PROCEDURE — 99233 SBSQ HOSP IP/OBS HIGH 50: CPT | Mod: GC

## 2022-11-14 PROCEDURE — 99255 IP/OBS CONSLTJ NEW/EST HI 80: CPT | Mod: GC

## 2022-11-14 PROCEDURE — 74177 CT ABD & PELVIS W/CONTRAST: CPT | Mod: 26

## 2022-11-14 PROCEDURE — 99291 CRITICAL CARE FIRST HOUR: CPT

## 2022-11-14 RX ORDER — DEXTROSE 50 % IN WATER 50 %
15 SYRINGE (ML) INTRAVENOUS ONCE
Refills: 0 | Status: DISCONTINUED | OUTPATIENT
Start: 2022-11-14 | End: 2022-11-18

## 2022-11-14 RX ORDER — INFLUENZA VIRUS VACCINE 15; 15; 15; 15 UG/.5ML; UG/.5ML; UG/.5ML; UG/.5ML
0.5 SUSPENSION INTRAMUSCULAR ONCE
Refills: 0 | Status: DISCONTINUED | OUTPATIENT
Start: 2022-11-14 | End: 2022-11-18

## 2022-11-14 RX ORDER — DEXTROSE 50 % IN WATER 50 %
12.5 SYRINGE (ML) INTRAVENOUS ONCE
Refills: 0 | Status: DISCONTINUED | OUTPATIENT
Start: 2022-11-14 | End: 2022-11-18

## 2022-11-14 RX ORDER — INSULIN LISPRO 100/ML
VIAL (ML) SUBCUTANEOUS
Refills: 0 | Status: DISCONTINUED | OUTPATIENT
Start: 2022-11-14 | End: 2022-11-14

## 2022-11-14 RX ORDER — LEVETIRACETAM 250 MG/1
500 TABLET, FILM COATED ORAL
Refills: 0 | Status: DISCONTINUED | OUTPATIENT
Start: 2022-11-14 | End: 2022-11-18

## 2022-11-14 RX ORDER — SODIUM CHLORIDE 9 MG/ML
1000 INJECTION, SOLUTION INTRAVENOUS
Refills: 0 | Status: DISCONTINUED | OUTPATIENT
Start: 2022-11-14 | End: 2022-11-18

## 2022-11-14 RX ORDER — DEXTROSE 50 % IN WATER 50 %
25 SYRINGE (ML) INTRAVENOUS ONCE
Refills: 0 | Status: DISCONTINUED | OUTPATIENT
Start: 2022-11-14 | End: 2022-11-18

## 2022-11-14 RX ORDER — GLUCAGON INJECTION, SOLUTION 0.5 MG/.1ML
1 INJECTION, SOLUTION SUBCUTANEOUS ONCE
Refills: 0 | Status: DISCONTINUED | OUTPATIENT
Start: 2022-11-14 | End: 2022-11-18

## 2022-11-14 RX ORDER — INSULIN LISPRO 100/ML
VIAL (ML) SUBCUTANEOUS EVERY 4 HOURS
Refills: 0 | Status: DISCONTINUED | OUTPATIENT
Start: 2022-11-14 | End: 2022-11-15

## 2022-11-14 RX ORDER — INSULIN LISPRO 100/ML
4 VIAL (ML) SUBCUTANEOUS
Refills: 0 | Status: DISCONTINUED | OUTPATIENT
Start: 2022-11-14 | End: 2022-11-14

## 2022-11-14 RX ORDER — SUCRALFATE 1 G
1 TABLET ORAL
Refills: 0 | Status: DISCONTINUED | OUTPATIENT
Start: 2022-11-14 | End: 2022-11-18

## 2022-11-14 RX ORDER — DEXTROSE 50 % IN WATER 50 %
50 SYRINGE (ML) INTRAVENOUS ONCE
Refills: 0 | Status: COMPLETED | OUTPATIENT
Start: 2022-11-14 | End: 2022-11-14

## 2022-11-14 RX ORDER — PANTOPRAZOLE SODIUM 20 MG/1
40 TABLET, DELAYED RELEASE ORAL
Refills: 0 | Status: DISCONTINUED | OUTPATIENT
Start: 2022-11-14 | End: 2022-11-18

## 2022-11-14 RX ORDER — FOLIC ACID 0.8 MG
1 TABLET ORAL DAILY
Refills: 0 | Status: DISCONTINUED | OUTPATIENT
Start: 2022-11-14 | End: 2022-11-18

## 2022-11-14 RX ORDER — THIAMINE MONONITRATE (VIT B1) 100 MG
100 TABLET ORAL DAILY
Refills: 0 | Status: DISCONTINUED | OUTPATIENT
Start: 2022-11-14 | End: 2022-11-18

## 2022-11-14 RX ORDER — METOPROLOL TARTRATE 50 MG
50 TABLET ORAL DAILY
Refills: 0 | Status: DISCONTINUED | OUTPATIENT
Start: 2022-11-14 | End: 2022-11-18

## 2022-11-14 RX ORDER — INSULIN LISPRO 100/ML
VIAL (ML) SUBCUTANEOUS EVERY 6 HOURS
Refills: 0 | Status: DISCONTINUED | OUTPATIENT
Start: 2022-11-14 | End: 2022-11-14

## 2022-11-14 RX ORDER — INSULIN GLARGINE 100 [IU]/ML
15 INJECTION, SOLUTION SUBCUTANEOUS EVERY MORNING
Refills: 0 | Status: DISCONTINUED | OUTPATIENT
Start: 2022-11-14 | End: 2022-11-15

## 2022-11-14 RX ORDER — INSULIN LISPRO 100/ML
VIAL (ML) SUBCUTANEOUS AT BEDTIME
Refills: 0 | Status: DISCONTINUED | OUTPATIENT
Start: 2022-11-14 | End: 2022-11-14

## 2022-11-14 RX ADMIN — Medication 100 MILLIGRAM(S): at 11:46

## 2022-11-14 RX ADMIN — HEPARIN SODIUM 5000 UNIT(S): 5000 INJECTION INTRAVENOUS; SUBCUTANEOUS at 13:40

## 2022-11-14 RX ADMIN — SODIUM CHLORIDE 175 MILLILITER(S): 9 INJECTION, SOLUTION INTRAVENOUS at 08:15

## 2022-11-14 RX ADMIN — PANTOPRAZOLE SODIUM 40 MILLIGRAM(S): 20 TABLET, DELAYED RELEASE ORAL at 11:46

## 2022-11-14 RX ADMIN — Medication 1 MILLIGRAM(S): at 11:46

## 2022-11-14 RX ADMIN — Medication 81 MILLIGRAM(S): at 11:46

## 2022-11-14 RX ADMIN — Medication 1 TABLET(S): at 11:46

## 2022-11-14 RX ADMIN — Medication 50 MILLILITER(S): at 02:45

## 2022-11-14 RX ADMIN — INSULIN HUMAN 1 UNIT(S)/HR: 100 INJECTION, SOLUTION SUBCUTANEOUS at 08:15

## 2022-11-14 RX ADMIN — Medication 50 MILLIGRAM(S): at 11:46

## 2022-11-14 RX ADMIN — CHLORHEXIDINE GLUCONATE 1 APPLICATION(S): 213 SOLUTION TOPICAL at 05:11

## 2022-11-14 RX ADMIN — Medication 4: at 11:45

## 2022-11-14 RX ADMIN — TICAGRELOR 90 MILLIGRAM(S): 90 TABLET ORAL at 05:11

## 2022-11-14 RX ADMIN — INSULIN GLARGINE 15 UNIT(S): 100 INJECTION, SOLUTION SUBCUTANEOUS at 08:16

## 2022-11-14 RX ADMIN — HEPARIN SODIUM 5000 UNIT(S): 5000 INJECTION INTRAVENOUS; SUBCUTANEOUS at 21:14

## 2022-11-14 RX ADMIN — Medication 85 MILLIMOLE(S): at 05:12

## 2022-11-14 RX ADMIN — Medication 1 GRAM(S): at 17:25

## 2022-11-14 RX ADMIN — HEPARIN SODIUM 5000 UNIT(S): 5000 INJECTION INTRAVENOUS; SUBCUTANEOUS at 05:11

## 2022-11-14 RX ADMIN — TICAGRELOR 90 MILLIGRAM(S): 90 TABLET ORAL at 17:30

## 2022-11-14 RX ADMIN — LEVETIRACETAM 500 MILLIGRAM(S): 250 TABLET, FILM COATED ORAL at 17:25

## 2022-11-14 NOTE — CONSULT NOTE ADULT - SUBJECTIVE AND OBJECTIVE BOX
ENDOCRINE INITIAL CONSULT - diabetes, DKA    HPI:  58yo M pmh HTN, T2DM (on insulin, trulicity), CAD (s/p PCI on brillinta), EtOH overuse p/w - Presenting to the ED with 3 to 4 days of general malaise , ABD pain, and inability to tolerate p.o.  Patient reports symptoms have been gradual in onset.  Last alcoholic drink was 4 days ago (Tuesday). He ususally drinks a pint of liquor on the weekends. Has not been hospitalized for Etoh withdrawal.  Has abdominal discomfort throughout the abdomen with no associated change in bowel movements or urine symptoms. He says he has been compliant with his DM medications (He takes Basaglar and Trulicity). Denies SOB, CP, Diarrhea, melena, hematochezia, hematemesis     In ED: Received Calcium gluc, Valium 10, Zosyn, Vanc, 2L Bolus IVF  (13 Nov 2022 15:54)      ENDOCRINE HISTORY   Diagnosed with DM:   Last HbA1c: 10.6  Endocrinologist:   Monica review of medications:  Home DM Meds:  Adherence:  Microvascular complications: Renal, opthalmologic, neuropathy  Macrovascular complications:  SMBG:  Symptoms:  Hypoglycemia episodes:  BG at home:  Diet at home:  Appetite in hospital:  Exercise:  PMHx:  PSHx:  Family hx:  Social hx:  Insurance:  Resides in:      PAST MEDICAL & SURGICAL HISTORY:  Diabetes      HTN (hypertension)      EtOH dependence      Transient cerebral ischemic attack      CAD (coronary artery disease)      Prophylactic measure      Status post placement of implantable loop recorder          FAMILY HISTORY:  FH: stroke (Mother)    FH: diabetes mellitus (Mother)        Social History:      Home Medications:  AFREZZA 8-12UNIT POW:  (13 Oct 2022 02:38)  aspirin 81 mg oral tablet: 1 tab(s) orally once a day (13 Oct 2022 02:38)  Basaglar KwikPen 100 units/mL subcutaneous solution: 20 unit(s) subcutaneous once a day (at bedtime) (13 Oct 2022 02:38)  folic acid 1 mg oral tablet: 1 tab(s) orally once a day (13 Oct 2022 02:38)  levETIRAcetam 500 mg oral tablet: 1 tab(s) orally 2 times a day (13 Oct 2022 02:38)  Multiple Vitamins oral tablet: 1 tab(s) orally once a day (13 Oct 2022 02:38)      MEDICATIONS  (STANDING):  aspirin  chewable 81 milliGRAM(s) Oral daily  chlorhexidine 4% Liquid 1 Application(s) Topical <User Schedule>  dextrose 5%. 1000 milliLiter(s) (50 mL/Hr) IV Continuous <Continuous>  dextrose 5%. 1000 milliLiter(s) (100 mL/Hr) IV Continuous <Continuous>  dextrose 50% Injectable 25 Gram(s) IV Push once  dextrose 50% Injectable 12.5 Gram(s) IV Push once  dextrose 50% Injectable 25 Gram(s) IV Push once  folic acid 1 milliGRAM(s) Oral daily  glucagon  Injectable 1 milliGRAM(s) IntraMuscular once  heparin   Injectable 5000 Unit(s) SubCutaneous every 8 hours  influenza   Vaccine 0.5 milliLiter(s) IntraMuscular once  insulin glargine Injectable (LANTUS) 15 Unit(s) SubCutaneous every morning  insulin lispro (ADMELOG) corrective regimen sliding scale   SubCutaneous every 6 hours  levETIRAcetam 500 milliGRAM(s) Oral two times a day  metoprolol succinate ER 50 milliGRAM(s) Oral daily  multivitamin 1 Tablet(s) Oral daily  pantoprazole    Tablet 40 milliGRAM(s) Oral before breakfast  sucralfate 1 Gram(s) Oral two times a day  thiamine 100 milliGRAM(s) Oral daily  ticagrelor 90 milliGRAM(s) Oral every 12 hours    MEDICATIONS  (PRN):  aluminum hydroxide/magnesium hydroxide/simethicone Suspension 30 milliLiter(s) Oral every 6 hours PRN Dyspepsia  dextrose Oral Gel 15 Gram(s) Oral once PRN Blood Glucose LESS THAN 70 milliGRAM(s)/deciliter      Allergies    No Known Allergies    Intolerances        REVIEW OF SYSTEMS  Constitutional: No fever  Eyes: No blurry vision  Neuro: No tremors  HEENT: No pain  Cardiovascular: No chest pain, palpitations  Respiratory: No SOB, no cough  GI: No nausea, vomiting, abdominal pain  : No dysuria  Skin: no rash  Psych: no depression  Endocrine: no polyuria, polydipsia  Hem/lymph: no swelling  Osteoporosis: no fractures  ALL OTHER SYSTEMS REVIEWED AND NEGATIVE     UNABLE TO OBTAIN     PHYSICAL EXAM   Vital Signs Last 24 Hrs  T(C): 36.2 (14 Nov 2022 12:00), Max: 37.3 (14 Nov 2022 00:00)  T(F): 97.2 (14 Nov 2022 12:00), Max: 99.1 (14 Nov 2022 00:00)  HR: 84 (14 Nov 2022 12:00) (83 - 111)  BP: 121/72 (14 Nov 2022 12:00) (90/49 - 140/82)  BP(mean): 82 (14 Nov 2022 12:00) (64 - 96)  RR: 15 (14 Nov 2022 12:00) (12 - 22)  SpO2: 100% (14 Nov 2022 12:00) (98% - 100%)    Parameters below as of 14 Nov 2022 11:00  Patient On (Oxygen Delivery Method): room air      GENERAL: NAD, well-groomed, well-developed  EYES: No proptosis, no lid lag, anicteric  HEENT:  Atraumatic, Normocephalic, moist mucous membranes  THYROID: Normal size, no palpable nodules  RESPIRATORY: Clear to auscultation bilaterally; No rales, rhonchi, wheezing  CARDIOVASCULAR: Regular rate and rhythm; No murmurs; no peripheral edema  GI: Soft, nontender, non distended, normal bowel sounds  SKIN: Dry, intact, No rashes or lesions  MUSCULOSKELETAL: Full range of motion, normal strength  NEURO: sensation intact, extraocular movements intact, no tremor  PSYCH: Alert and oriented x 3, normal affect, normal mood  CUSHING'S SIGNS: no striae    CAPILLARY BLOOD GLUCOSE      POCT Blood Glucose.: 307 mg/dL (14 Nov 2022 11:41)  POCT Blood Glucose.: 185 mg/dL (14 Nov 2022 08:09)  POCT Blood Glucose.: 167 mg/dL (14 Nov 2022 06:51)  POCT Blood Glucose.: 149 mg/dL (14 Nov 2022 06:11)  POCT Blood Glucose.: 113 mg/dL (14 Nov 2022 05:07)  POCT Blood Glucose.: 106 mg/dL (14 Nov 2022 04:04)  POCT Blood Glucose.: 112 mg/dL (14 Nov 2022 03:31)  POCT Blood Glucose.: 139 mg/dL (14 Nov 2022 03:07)  POCT Blood Glucose.: 66 mg/dL (14 Nov 2022 02:29)  POCT Blood Glucose.: 55 mg/dL (14 Nov 2022 02:27)  POCT Blood Glucose.: 95 mg/dL (14 Nov 2022 01:07)  POCT Blood Glucose.: 123 mg/dL (13 Nov 2022 23:58)  POCT Blood Glucose.: 161 mg/dL (13 Nov 2022 23:03)  POCT Blood Glucose.: 195 mg/dL (13 Nov 2022 22:02)  POCT Blood Glucose.: 198 mg/dL (13 Nov 2022 21:02)  POCT Blood Glucose.: 230 mg/dL (13 Nov 2022 20:01)  POCT Blood Glucose.: 258 mg/dL (13 Nov 2022 19:00)  POCT Blood Glucose.: 342 mg/dL (13 Nov 2022 18:11)  POCT Blood Glucose.: 367 mg/dL (13 Nov 2022 16:55)  POCT Blood Glucose.: 408 mg/dL (13 Nov 2022 15:38)      A1C with Estimated Average Glucose Result: 10.6 % (10-13-22 @ 08:18)                            12.1   5.31  )-----------( 102      ( 14 Nov 2022 02:30 )             35.7       11-14    139  |  106  |  45<H>  ----------------------------<  131<H>  4.6   |  22  |  0.99    Ca    8.6      14 Nov 2022 05:19  Phos  2.2     11-14  Mg     2.00     11-14    TPro  5.0<L>  /  Alb  3.1<L>  /  TBili  0.5  /  DBili  <0.2  /  AST  31  /  ALT  66<H>  /  AlkPhos  43  11-14      Thyroid Stimulating Hormone, Serum: 0.87 uIU/mL (11-13-22 @ 13:35)      LIPIDS    RADIOLOGY ENDOCRINE INITIAL CONSULT - diabetes, DKA    HPI:  56yo M pmh HTN, T2DM, CAD (s/p PCI on brillinta), EtOH overuse p/w - Presenting to the ED with 3 to 4 days of general malaise , ABD pain, and inability to tolerate p.o.  Patient reports symptoms have been gradual in onset.  Last alcoholic drink was 4 days ago (Tuesday). He ususally drinks a pint of liquor on the weekends. Has not been hospitalized for Etoh withdrawal.  Has abdominal discomfort throughout the abdomen with no associated change in bowel movements or urine symptoms. He says he has been compliant with his DM medications (He takes Basaglar and Trulicity). Denies SOB, CP, Diarrhea, melena, hematochezia, hematemesis     In ED: Received Calcium gluc, Valium 10, Zosyn, Vanc, 2L Bolus IVF  (2022 15:54)    ENDOCRINE HISTORY   Diagnosed with DM: 2, 10 years ago  Last HbA1c: 10.6  Endocrinologist: Dr. Jeane Delgado (PCP), reports he may have seen Dr. Diaz outpatient  Allscripts: Cardiologist mentioned farxiga, patient denies  Home DM Meds: trulicity 0.75mg subcutaneous weekly, reports not taking insulin, was previously on basaglar stopped when started on trulicity 2 weeks ago  Adherence: denies missing doses  Microvascular complications: denies nephropathy, last saw ophtho 1 month ago who did a dilated eye exam, denies retinopathy, denies neuropathy   Macrovascular complications: endorses MI or CVA  SMB-3 times per week randomly  Symptoms: denies polyuria, polydipsia, numbness or tingling in his hands or feet  Hypoglycemia episodes: denies <70  BG at home: 100-300  Diet at home: rice, bread, irma bread, vegetables, tries to avoid sweet snacks, denies juice or soda but reports will have vitamin water  Appetite in hospital: poor  Exercise: denies  PMHx: as above  PSHx: as above  Family hx: mother with diabetes and cva, denies thyroid conditions  Social hx: 1-2 pints of alcohol on the weekend, reports used to drink much more, denies tobacco use or recreational drug use  Insurance: medicaid  Resides in: [place]      PAST MEDICAL & SURGICAL HISTORY:  Diabetes      HTN (hypertension)      EtOH dependence      Transient cerebral ischemic attack      CAD (coronary artery disease)      Prophylactic measure      Status post placement of implantable loop recorder          FAMILY HISTORY:  FH: stroke (Mother)    FH: diabetes mellitus (Mother)      Home Medications:  AFREZZA 8-12UNIT POW:  (13 Oct 2022 02:38)  aspirin 81 mg oral tablet: 1 tab(s) orally once a day (13 Oct 2022 02:38)  Basaglar KwikPen 100 units/mL subcutaneous solution: 20 unit(s) subcutaneous once a day (at bedtime) (13 Oct 2022 02:38)  folic acid 1 mg oral tablet: 1 tab(s) orally once a day (13 Oct 2022 02:38)  levETIRAcetam 500 mg oral tablet: 1 tab(s) orally 2 times a day (13 Oct 2022 02:38)  Multiple Vitamins oral tablet: 1 tab(s) orally once a day (13 Oct 2022 02:38)      MEDICATIONS  (STANDING):  aspirin  chewable 81 milliGRAM(s) Oral daily  chlorhexidine 4% Liquid 1 Application(s) Topical <User Schedule>  dextrose 5%. 1000 milliLiter(s) (50 mL/Hr) IV Continuous <Continuous>  dextrose 5%. 1000 milliLiter(s) (100 mL/Hr) IV Continuous <Continuous>  dextrose 50% Injectable 25 Gram(s) IV Push once  dextrose 50% Injectable 12.5 Gram(s) IV Push once  dextrose 50% Injectable 25 Gram(s) IV Push once  folic acid 1 milliGRAM(s) Oral daily  glucagon  Injectable 1 milliGRAM(s) IntraMuscular once  heparin   Injectable 5000 Unit(s) SubCutaneous every 8 hours  influenza   Vaccine 0.5 milliLiter(s) IntraMuscular once  insulin glargine Injectable (LANTUS) 15 Unit(s) SubCutaneous every morning  insulin lispro (ADMELOG) corrective regimen sliding scale   SubCutaneous every 6 hours  levETIRAcetam 500 milliGRAM(s) Oral two times a day  metoprolol succinate ER 50 milliGRAM(s) Oral daily  multivitamin 1 Tablet(s) Oral daily  pantoprazole    Tablet 40 milliGRAM(s) Oral before breakfast  sucralfate 1 Gram(s) Oral two times a day  thiamine 100 milliGRAM(s) Oral daily  ticagrelor 90 milliGRAM(s) Oral every 12 hours    MEDICATIONS  (PRN):  aluminum hydroxide/magnesium hydroxide/simethicone Suspension 30 milliLiter(s) Oral every 6 hours PRN Dyspepsia  dextrose Oral Gel 15 Gram(s) Oral once PRN Blood Glucose LESS THAN 70 milliGRAM(s)/deciliter      Allergies    No Known Allergies    Intolerances        REVIEW OF SYSTEMS  Constitutional: No fever, chills  Eyes: No blurry vision  Neuro: No tremors  HEENT: No pain  Cardiovascular: No chest pain, palpitations  Respiratory: No SOB, endorses cough  GI: No nausea, vomiting, abdominal pain, diarrhea, constipation  : No dysuria  Skin: no rash  Psych: no depression  Endocrine: no polyuria, polydipsia  Hem/lymph: no swelling  Osteoporosis: no fractures  ALL OTHER SYSTEMS REVIEWED AND NEGATIVE     PHYSICAL EXAM   Vital Signs Last 24 Hrs  T(C): 36.2 (2022 12:00), Max: 37.3 (2022 00:00)  T(F): 97.2 (2022 12:00), Max: 99.1 (2022 00:00)  HR: 84 (2022 12:00) (83 - 111)  BP: 121/72 (2022 12:00) (90/49 - 140/82)  BP(mean): 82 (2022 12:00) (64 - 96)  RR: 15 (2022 12:00) (12 - 22)  SpO2: 100% (2022 12:00) (98% - 100%)    Parameters below as of 2022 11:00  Patient On (Oxygen Delivery Method): room air      GENERAL: NAD, well-groomed, well-developed  EYES: No proptosis, no lid lag, anicteric  HEENT:  Atraumatic, Normocephalic, moist mucous membranes  THYROID: Normal size, no palpable nodules  RESPIRATORY: Clear to auscultation bilaterally; No rales, rhonchi, wheezing  CARDIOVASCULAR: Regular rate and rhythm; No murmurs; no peripheral edema  GI: Soft, nontender, non distended, normal bowel sounds  SKIN: Dry, intact, No rashes or lesions  MUSCULOSKELETAL: Full range of motion, normal strength  NEURO: sensation intact, extraocular movements intact, no tremor  PSYCH: Alert and oriented x 3, normal affect, normal mood  CUSHING'S SIGNS: no striae    CAPILLARY BLOOD GLUCOSE      POCT Blood Glucose.: 307 mg/dL (2022 11:41)  POCT Blood Glucose.: 185 mg/dL (2022 08:09)  POCT Blood Glucose.: 167 mg/dL (2022 06:51)  POCT Blood Glucose.: 149 mg/dL (2022 06:11)  POCT Blood Glucose.: 113 mg/dL (2022 05:07)  POCT Blood Glucose.: 106 mg/dL (2022 04:04)  POCT Blood Glucose.: 112 mg/dL (2022 03:31)  POCT Blood Glucose.: 139 mg/dL (2022 03:07)  POCT Blood Glucose.: 66 mg/dL (2022 02:29)  POCT Blood Glucose.: 55 mg/dL (2022 02:27)  POCT Blood Glucose.: 95 mg/dL (2022 01:07)  POCT Blood Glucose.: 123 mg/dL (2022 23:58)  POCT Blood Glucose.: 161 mg/dL (2022 23:03)  POCT Blood Glucose.: 195 mg/dL (2022 22:02)  POCT Blood Glucose.: 198 mg/dL (2022 21:02)  POCT Blood Glucose.: 230 mg/dL (2022 20:01)  POCT Blood Glucose.: 258 mg/dL (2022 19:00)  POCT Blood Glucose.: 342 mg/dL (2022 18:11)  POCT Blood Glucose.: 367 mg/dL (2022 16:55)  POCT Blood Glucose.: 408 mg/dL (2022 15:38)      A1C with Estimated Average Glucose Result: 10.6 % (10-13-22 @ 08:18)                            12.1   5.31  )-----------( 102      ( 2022 02:30 )             35.7       -    139  |  106  |  45<H>  ----------------------------<  131<H>  4.6   |  22  |  0.99    Ca    8.6      2022 05:19  Phos  2.2     -  Mg     2.00         TPro  5.0<L>  /  Alb  3.1<L>  /  TBili  0.5  /  DBili  <0.2  /  AST  31  /  ALT  66<H>  /  AlkPhos  43        Thyroid Stimulating Hormone, Serum: 0.87 uIU/mL (22 @ 13:35)      LIPIDS    RADIOLOGY ENDOCRINE INITIAL CONSULT - diabetes, DKA    HPI:  58yo M pmh HTN, T2DM, CAD (s/p PCI on brillinta), EtOH overuse p/w - Presenting to the ED with 3 to 4 days of general malaise , ABD pain, and inability to tolerate p.o.  Patient reports symptoms have been gradual in onset.  Last alcoholic drink was 4 days ago (Tuesday). He ususally drinks a pint of liquor on the weekends. Has not been hospitalized for Etoh withdrawal.  Has abdominal discomfort throughout the abdomen with no associated change in bowel movements or urine symptoms. He says he has been compliant with his DM medications (He takes Basaglar and Trulicity). Denies SOB, CP, Diarrhea, melena, hematochezia, hematemesis     In ED: Received Calcium gluc, Valium 10, Zosyn, Vanc, 2L Bolus IVF  (2022 15:54)    ENDOCRINE HISTORY   Diagnosed with DM: 2, 10 years ago  Last HbA1c: 10.6  Endocrinologist: Dr. Jeane Delgado (PCP), reports he may have seen Dr. Diaz outpatient  Allscripts: Cardiologist mentioned farxiga, patient denies  Home DM Meds: trulicity 0.75mg subcutaneous weekly, reports not taking insulin, was previously on basaglar stopped when started on trulicity 2 weeks ago  Adherence: denies missing doses  Microvascular complications: denies nephropathy, last saw ophtho 1 month ago who did a dilated eye exam, denies retinopathy, denies neuropathy   Macrovascular complications: endorses MI or CVA  SMB-3 times per week randomly  Symptoms: denies polyuria, polydipsia, numbness or tingling in his hands or feet  Hypoglycemia episodes: denies <70  BG at home: 100-300  Diet at home: rice, bread, irma bread, vegetables, tries to avoid sweet snacks, denies juice or soda but reports will have vitamin water  Appetite in hospital: poor  Exercise: denies  PMHx: as above  PSHx: as above  Family hx: mother with diabetes and cva, denies thyroid conditions  Social hx: 1-2 pints of alcohol on the weekend, reports used to drink much more, denies tobacco use or recreational drug use  Insurance: medicaid  Resides in: [place]      PAST MEDICAL & SURGICAL HISTORY:  Diabetes      HTN (hypertension)      EtOH dependence      Transient cerebral ischemic attack      CAD (coronary artery disease)      Prophylactic measure      Status post placement of implantable loop recorder          FAMILY HISTORY:  FH: stroke (Mother)    FH: diabetes mellitus (Mother)      Home Medications:  AFREZZA 8-12UNIT POW:  (13 Oct 2022 02:38)  aspirin 81 mg oral tablet: 1 tab(s) orally once a day (13 Oct 2022 02:38)  Basaglar KwikPen 100 units/mL subcutaneous solution: 20 unit(s) subcutaneous once a day (at bedtime) (13 Oct 2022 02:38)  folic acid 1 mg oral tablet: 1 tab(s) orally once a day (13 Oct 2022 02:38)  levETIRAcetam 500 mg oral tablet: 1 tab(s) orally 2 times a day (13 Oct 2022 02:38)  Multiple Vitamins oral tablet: 1 tab(s) orally once a day (13 Oct 2022 02:38)      MEDICATIONS  (STANDING):  aspirin  chewable 81 milliGRAM(s) Oral daily  chlorhexidine 4% Liquid 1 Application(s) Topical <User Schedule>  dextrose 5%. 1000 milliLiter(s) (50 mL/Hr) IV Continuous <Continuous>  dextrose 5%. 1000 milliLiter(s) (100 mL/Hr) IV Continuous <Continuous>  dextrose 50% Injectable 25 Gram(s) IV Push once  dextrose 50% Injectable 12.5 Gram(s) IV Push once  dextrose 50% Injectable 25 Gram(s) IV Push once  folic acid 1 milliGRAM(s) Oral daily  glucagon  Injectable 1 milliGRAM(s) IntraMuscular once  heparin   Injectable 5000 Unit(s) SubCutaneous every 8 hours  influenza   Vaccine 0.5 milliLiter(s) IntraMuscular once  insulin glargine Injectable (LANTUS) 15 Unit(s) SubCutaneous every morning  insulin lispro (ADMELOG) corrective regimen sliding scale   SubCutaneous every 6 hours  levETIRAcetam 500 milliGRAM(s) Oral two times a day  metoprolol succinate ER 50 milliGRAM(s) Oral daily  multivitamin 1 Tablet(s) Oral daily  pantoprazole    Tablet 40 milliGRAM(s) Oral before breakfast  sucralfate 1 Gram(s) Oral two times a day  thiamine 100 milliGRAM(s) Oral daily  ticagrelor 90 milliGRAM(s) Oral every 12 hours    MEDICATIONS  (PRN):  aluminum hydroxide/magnesium hydroxide/simethicone Suspension 30 milliLiter(s) Oral every 6 hours PRN Dyspepsia  dextrose Oral Gel 15 Gram(s) Oral once PRN Blood Glucose LESS THAN 70 milliGRAM(s)/deciliter      Allergies    No Known Allergies    Intolerances        REVIEW OF SYSTEMS  Constitutional: No fever, chills  Eyes: No blurry vision  Neuro: No tremors  HEENT: No pain  Cardiovascular: No chest pain, palpitations  Respiratory: No SOB, endorses cough  GI: No nausea, vomiting, abdominal pain, diarrhea, constipation  : No dysuria  Skin: no rash  Psych: no depression  Endocrine: no polyuria, polydipsia  Hem/lymph: no swelling  Osteoporosis: no fractures  ALL OTHER SYSTEMS REVIEWED AND NEGATIVE     PHYSICAL EXAM   Vital Signs Last 24 Hrs  T(C): 36.2 (2022 12:00), Max: 37.3 (2022 00:00)  T(F): 97.2 (2022 12:00), Max: 99.1 (2022 00:00)  HR: 84 (2022 12:00) (83 - 111)  BP: 121/72 (2022 12:00) (90/49 - 140/82)  BP(mean): 82 (2022 12:00) (64 - 96)  RR: 15 (2022 12:00) (12 - 22)  SpO2: 100% (2022 12:00) (98% - 100%)    Parameters below as of 2022 11:00  Patient On (Oxygen Delivery Method): room air      GENERAL: NAD, lying in bed  EYES: No proptosis, anicteric  HEENT:  Atraumatic, Normocephalic, moist mucous membranes  THYROID: Normal size, no palpable nodules  RESPIRATORY: Clear to auscultation bilaterally; No rales, rhonchi, wheezing  CARDIOVASCULAR: Regular rate and rhythm; No murmurs; no peripheral edema  GI: Soft, nontender, non distended, normal bowel sounds  SKIN: Dry, intact, dry feet  MUSCULOSKELETAL: Full range of motion, normal strength  NEURO: sensation intact on plantar surface of feet, extraocular movements intact, no tremor  PSYCH: Answering questions appropriately, flat affect and  mood  CUSHING'S SIGNS: no striae, no acanthosis, no dorsocervical fat pad    CAPILLARY BLOOD GLUCOSE      POCT Blood Glucose.: 307 mg/dL (2022 11:41)  POCT Blood Glucose.: 185 mg/dL (2022 08:09)  POCT Blood Glucose.: 167 mg/dL (2022 06:51)  POCT Blood Glucose.: 149 mg/dL (2022 06:11)  POCT Blood Glucose.: 113 mg/dL (2022 05:07)  POCT Blood Glucose.: 106 mg/dL (2022 04:04)  POCT Blood Glucose.: 112 mg/dL (2022 03:31)  POCT Blood Glucose.: 139 mg/dL (2022 03:07)  POCT Blood Glucose.: 66 mg/dL (2022 02:29)  POCT Blood Glucose.: 55 mg/dL (2022 02:27)  POCT Blood Glucose.: 95 mg/dL (2022 01:07)  POCT Blood Glucose.: 123 mg/dL (2022 23:58)  POCT Blood Glucose.: 161 mg/dL (2022 23:03)  POCT Blood Glucose.: 195 mg/dL (2022 22:02)  POCT Blood Glucose.: 198 mg/dL (2022 21:02)  POCT Blood Glucose.: 230 mg/dL (2022 20:01)  POCT Blood Glucose.: 258 mg/dL (2022 19:00)  POCT Blood Glucose.: 342 mg/dL (2022 18:11)  POCT Blood Glucose.: 367 mg/dL (2022 16:55)  POCT Blood Glucose.: 408 mg/dL (2022 15:38)      A1C with Estimated Average Glucose Result: 10.6 % (10-13-22 @ 08:18)                            12.1   5.31  )-----------( 102      ( 2022 02:30 )             35.7           139  |  106  |  45<H>  ----------------------------<  131<H>  4.6   |  22  |  0.99    Ca    8.6      2022 05:19  Phos  2.2     -  Mg     2.00         TPro  5.0<L>  /  Alb  3.1<L>  /  TBili  0.5  /  DBili  <0.2  /  AST  31  /  ALT  66<H>  /  AlkPhos  43        Thyroid Stimulating Hormone, Serum: 0.87 uIU/mL (22 @ 13:35)      LIPIDS    RADIOLOGY ENDOCRINE INITIAL CONSULT - diabetes, DKA    HPI:  56yo M pmh HTN, T2DM, CAD (s/p PCI on brillinta), EtOH overuse p/w - Presenting to the ED with 3 to 4 days of general malaise , ABD pain, and inability to tolerate p.o.  Patient reports symptoms have been gradual in onset.  Last alcoholic drink was 4 days ago (Tuesday). He ususally drinks a pint of liquor on the weekends. Has not been hospitalized for Etoh withdrawal.  Has abdominal discomfort throughout the abdomen with no associated change in bowel movements or urine symptoms. He says he has been compliant with his DM medications (He takes Basaglar and Trulicity). Denies SOB, CP, Diarrhea, melena, hematochezia, hematemesis     In ED: Received Calcium gluc, Valium 10, Zosyn, Vanc, 2L Bolus IVF  (2022 15:54)    ENDOCRINE HISTORY   Diagnosed with DM: 2, 10 years ago  Last HbA1c: 10.6  Endocrinologist: Dr. Jeane Delgado (PCP), reports he may have seen Dr. Diaz outpatient  Allscripts: Cardiologist mentioned farxiga, patient denies  Home DM Meds: trulicity 0.75mg subcutaneous weekly started one month ago when basaglar 20 units and afreeza 12 units bid stopped  Adherence: denies missing doses  Microvascular complications: denies nephropathy, last saw ophtho 1 month ago who did a dilated eye exam, denies retinopathy, denies neuropathy   Macrovascular complications: endorses MI or CVA  SMB-3 times per week randomly  Symptoms: denies polyuria, polydipsia, numbness or tingling in his hands or feet  Hypoglycemia episodes: denies <70  BG at home: 100-300  Diet at home: rice, bread, irma bread, vegetables, tries to avoid sweet snacks, denies juice or soda but reports will have vitamin water  Appetite in hospital: poor  Exercise: denies  PMHx: as above  PSHx: as above  Family hx: mother with diabetes and cva, denies thyroid conditions  Social hx: 1-2 pints of alcohol on the weekend, reports used to drink much more, denies tobacco use or recreational drug use  Insurance: medicaid  Resides in: [place]      PAST MEDICAL & SURGICAL HISTORY:  Diabetes      HTN (hypertension)      EtOH dependence      Transient cerebral ischemic attack      CAD (coronary artery disease)      Prophylactic measure      Status post placement of implantable loop recorder          FAMILY HISTORY:  FH: stroke (Mother)    FH: diabetes mellitus (Mother)      Home Medications:  AFREZZA 8-12UNIT POW:  (13 Oct 2022 02:38)  aspirin 81 mg oral tablet: 1 tab(s) orally once a day (13 Oct 2022 02:38)  Basaglar KwikPen 100 units/mL subcutaneous solution: 20 unit(s) subcutaneous once a day (at bedtime) (13 Oct 2022 02:38)  folic acid 1 mg oral tablet: 1 tab(s) orally once a day (13 Oct 2022 02:38)  levETIRAcetam 500 mg oral tablet: 1 tab(s) orally 2 times a day (13 Oct 2022 02:38)  Multiple Vitamins oral tablet: 1 tab(s) orally once a day (13 Oct 2022 02:38)      MEDICATIONS  (STANDING):  aspirin  chewable 81 milliGRAM(s) Oral daily  chlorhexidine 4% Liquid 1 Application(s) Topical <User Schedule>  dextrose 5%. 1000 milliLiter(s) (50 mL/Hr) IV Continuous <Continuous>  dextrose 5%. 1000 milliLiter(s) (100 mL/Hr) IV Continuous <Continuous>  dextrose 50% Injectable 25 Gram(s) IV Push once  dextrose 50% Injectable 12.5 Gram(s) IV Push once  dextrose 50% Injectable 25 Gram(s) IV Push once  folic acid 1 milliGRAM(s) Oral daily  glucagon  Injectable 1 milliGRAM(s) IntraMuscular once  heparin   Injectable 5000 Unit(s) SubCutaneous every 8 hours  influenza   Vaccine 0.5 milliLiter(s) IntraMuscular once  insulin glargine Injectable (LANTUS) 15 Unit(s) SubCutaneous every morning  insulin lispro (ADMELOG) corrective regimen sliding scale   SubCutaneous every 6 hours  levETIRAcetam 500 milliGRAM(s) Oral two times a day  metoprolol succinate ER 50 milliGRAM(s) Oral daily  multivitamin 1 Tablet(s) Oral daily  pantoprazole    Tablet 40 milliGRAM(s) Oral before breakfast  sucralfate 1 Gram(s) Oral two times a day  thiamine 100 milliGRAM(s) Oral daily  ticagrelor 90 milliGRAM(s) Oral every 12 hours    MEDICATIONS  (PRN):  aluminum hydroxide/magnesium hydroxide/simethicone Suspension 30 milliLiter(s) Oral every 6 hours PRN Dyspepsia  dextrose Oral Gel 15 Gram(s) Oral once PRN Blood Glucose LESS THAN 70 milliGRAM(s)/deciliter      Allergies    No Known Allergies    Intolerances        REVIEW OF SYSTEMS  Constitutional: No fever, chills  Eyes: No blurry vision  Neuro: No tremors  HEENT: No pain  Cardiovascular: No chest pain, palpitations  Respiratory: No SOB, endorses cough  GI: No nausea, vomiting, abdominal pain, diarrhea, constipation  : No dysuria  Skin: no rash  Psych: no depression  Endocrine: no polyuria, polydipsia  Hem/lymph: no swelling  Osteoporosis: no fractures  ALL OTHER SYSTEMS REVIEWED AND NEGATIVE     PHYSICAL EXAM   Vital Signs Last 24 Hrs  T(C): 36.2 (2022 12:00), Max: 37.3 (2022 00:00)  T(F): 97.2 (2022 12:00), Max: 99.1 (2022 00:00)  HR: 84 (2022 12:00) (83 - 111)  BP: 121/72 (2022 12:00) (90/49 - 140/82)  BP(mean): 82 (2022 12:00) (64 - 96)  RR: 15 (2022 12:00) (12 - 22)  SpO2: 100% (2022 12:00) (98% - 100%)    Parameters below as of 2022 11:00  Patient On (Oxygen Delivery Method): room air      GENERAL: NAD, lying in bed  EYES: No proptosis, anicteric  HEENT:  Atraumatic, Normocephalic, moist mucous membranes  THYROID: Normal size, no palpable nodules  RESPIRATORY: Clear to auscultation bilaterally; No rales, rhonchi, wheezing  CARDIOVASCULAR: Regular rate and rhythm; No murmurs; no peripheral edema  GI: Soft, nontender, non distended, normal bowel sounds  SKIN: Dry, intact, dry feet  MUSCULOSKELETAL: Full range of motion, normal strength  NEURO: sensation intact on plantar surface of feet, extraocular movements intact, no tremor  PSYCH: Answering questions appropriately, flat affect and  mood  CUSHING'S SIGNS: no striae, no acanthosis, no dorsocervical fat pad    CAPILLARY BLOOD GLUCOSE      POCT Blood Glucose.: 307 mg/dL (2022 11:41)  POCT Blood Glucose.: 185 mg/dL (2022 08:09)  POCT Blood Glucose.: 167 mg/dL (2022 06:51)  POCT Blood Glucose.: 149 mg/dL (2022 06:11)  POCT Blood Glucose.: 113 mg/dL (2022 05:07)  POCT Blood Glucose.: 106 mg/dL (2022 04:04)  POCT Blood Glucose.: 112 mg/dL (2022 03:31)  POCT Blood Glucose.: 139 mg/dL (2022 03:07)  POCT Blood Glucose.: 66 mg/dL (2022 02:29)  POCT Blood Glucose.: 55 mg/dL (2022 02:27)  POCT Blood Glucose.: 95 mg/dL (2022 01:07)  POCT Blood Glucose.: 123 mg/dL (2022 23:58)  POCT Blood Glucose.: 161 mg/dL (2022 23:03)  POCT Blood Glucose.: 195 mg/dL (2022 22:02)  POCT Blood Glucose.: 198 mg/dL (2022 21:02)  POCT Blood Glucose.: 230 mg/dL (2022 20:01)  POCT Blood Glucose.: 258 mg/dL (2022 19:00)  POCT Blood Glucose.: 342 mg/dL (2022 18:11)  POCT Blood Glucose.: 367 mg/dL (2022 16:55)  POCT Blood Glucose.: 408 mg/dL (2022 15:38)      A1C with Estimated Average Glucose Result: 10.6 % (10-13-22 @ 08:18)                            12.1   5.31  )-----------( 102      ( 2022 02:30 )             35.7       -    139  |  106  |  45<H>  ----------------------------<  131<H>  4.6   |  22  |  0.99    Ca    8.6      2022 05:19  Phos  2.2     -14  Mg     2.00     -14    TPro  5.0<L>  /  Alb  3.1<L>  /  TBili  0.5  /  DBili  <0.2  /  AST  31  /  ALT  66<H>  /  AlkPhos  43  11-14      Thyroid Stimulating Hormone, Serum: 0.87 uIU/mL (22 @ 13:35)      LIPIDS    RADIOLOGY ENDOCRINE INITIAL CONSULT - diabetes, DKA    HPI:  56yo M pmh HTN, T2DM, CAD (s/p PCI on brillinta), EtOH overuse p/w - Presenting to the ED with 3 to 4 days of general malaise , ABD pain, and inability to tolerate p.o.  Patient reports symptoms have been gradual in onset.  Last alcoholic drink was 4 days ago (Tuesday). He ususally drinks a pint of liquor on the weekends. Has not been hospitalized for Etoh withdrawal.  Has abdominal discomfort throughout the abdomen with no associated change in bowel movements or urine symptoms. He says he has been compliant with his DM medications (He takes Basaglar and Trulicity). Denies SOB, CP, Diarrhea, melena, hematochezia, hematemesis     In ED: Received Calcium gluc, Valium 10, Zosyn, Vanc, 2L Bolus IVF  (2022 15:54)    ENDOCRINE HISTORY   Diagnosed with DM: 2, 10 years ago  Last HbA1c: 10.6  Endocrinologist: Dr. Jeane Delgado (PCP), reports he may have seen Dr. Diaz outpatient  Allscripts: Cardiologist mentioned farxiga, patient denies  Home DM Meds: trulicity 0.75mg subcutaneous weekly started one month ago when basaglar 20 units and afrezza 12 units bid stopped  Adherence: denies missing doses  Microvascular complications: denies nephropathy, last saw ophtho 1 month ago who did a dilated eye exam, denies retinopathy, denies neuropathy   Macrovascular complications: endorses MI or CVA  SMB-3 times per week randomly  Symptoms: denies polyuria, polydipsia, numbness or tingling in his hands or feet  Hypoglycemia episodes: denies <70  BG at home: 100-300  Diet at home: rice, bread, irma bread, vegetables, tries to avoid sweet snacks, denies juice or soda but reports will have vitamin water  Appetite in hospital: poor  Exercise: denies  PMHx: as above  PSHx: as above  Family hx: mother with diabetes and cva, denies thyroid conditions  Social hx: 1-2 pints of alcohol on the weekend, reports used to drink much more, denies tobacco use or recreational drug use  Insurance: medicaid  Resides in: [place]      PAST MEDICAL & SURGICAL HISTORY:  Diabetes      HTN (hypertension)      EtOH dependence      Transient cerebral ischemic attack      CAD (coronary artery disease)      Prophylactic measure      Status post placement of implantable loop recorder          FAMILY HISTORY:  FH: stroke (Mother)    FH: diabetes mellitus (Mother)      Home Medications:  AFREZZA 8-12UNIT POW:  (13 Oct 2022 02:38)  aspirin 81 mg oral tablet: 1 tab(s) orally once a day (13 Oct 2022 02:38)  Basaglar KwikPen 100 units/mL subcutaneous solution: 20 unit(s) subcutaneous once a day (at bedtime) (13 Oct 2022 02:38)  folic acid 1 mg oral tablet: 1 tab(s) orally once a day (13 Oct 2022 02:38)  levETIRAcetam 500 mg oral tablet: 1 tab(s) orally 2 times a day (13 Oct 2022 02:38)  Multiple Vitamins oral tablet: 1 tab(s) orally once a day (13 Oct 2022 02:38)      MEDICATIONS  (STANDING):  aspirin  chewable 81 milliGRAM(s) Oral daily  chlorhexidine 4% Liquid 1 Application(s) Topical <User Schedule>  dextrose 5%. 1000 milliLiter(s) (50 mL/Hr) IV Continuous <Continuous>  dextrose 5%. 1000 milliLiter(s) (100 mL/Hr) IV Continuous <Continuous>  dextrose 50% Injectable 25 Gram(s) IV Push once  dextrose 50% Injectable 12.5 Gram(s) IV Push once  dextrose 50% Injectable 25 Gram(s) IV Push once  folic acid 1 milliGRAM(s) Oral daily  glucagon  Injectable 1 milliGRAM(s) IntraMuscular once  heparin   Injectable 5000 Unit(s) SubCutaneous every 8 hours  influenza   Vaccine 0.5 milliLiter(s) IntraMuscular once  insulin glargine Injectable (LANTUS) 15 Unit(s) SubCutaneous every morning  insulin lispro (ADMELOG) corrective regimen sliding scale   SubCutaneous every 6 hours  levETIRAcetam 500 milliGRAM(s) Oral two times a day  metoprolol succinate ER 50 milliGRAM(s) Oral daily  multivitamin 1 Tablet(s) Oral daily  pantoprazole    Tablet 40 milliGRAM(s) Oral before breakfast  sucralfate 1 Gram(s) Oral two times a day  thiamine 100 milliGRAM(s) Oral daily  ticagrelor 90 milliGRAM(s) Oral every 12 hours    MEDICATIONS  (PRN):  aluminum hydroxide/magnesium hydroxide/simethicone Suspension 30 milliLiter(s) Oral every 6 hours PRN Dyspepsia  dextrose Oral Gel 15 Gram(s) Oral once PRN Blood Glucose LESS THAN 70 milliGRAM(s)/deciliter      Allergies    No Known Allergies    Intolerances        REVIEW OF SYSTEMS  Constitutional: No fever, chills  Eyes: No blurry vision  Neuro: No tremors  HEENT: No pain  Cardiovascular: No chest pain, palpitations  Respiratory: No SOB, endorses cough  GI: No nausea, vomiting, abdominal pain, diarrhea, constipation  : No dysuria  Skin: no rash  Psych: no depression  Endocrine: no polyuria, polydipsia  Hem/lymph: no swelling  Osteoporosis: no fractures  ALL OTHER SYSTEMS REVIEWED AND NEGATIVE     PHYSICAL EXAM   Vital Signs Last 24 Hrs  T(C): 36.2 (2022 12:00), Max: 37.3 (2022 00:00)  T(F): 97.2 (2022 12:00), Max: 99.1 (2022 00:00)  HR: 84 (2022 12:00) (83 - 111)  BP: 121/72 (2022 12:00) (90/49 - 140/82)  BP(mean): 82 (2022 12:00) (64 - 96)  RR: 15 (2022 12:00) (12 - 22)  SpO2: 100% (2022 12:00) (98% - 100%)    Parameters below as of 2022 11:00  Patient On (Oxygen Delivery Method): room air      GENERAL: NAD, lying in bed  EYES: No proptosis, anicteric  HEENT:  Atraumatic, Normocephalic, moist mucous membranes  THYROID: Normal size, no palpable nodules  RESPIRATORY: Clear to auscultation bilaterally; No rales, rhonchi, wheezing  CARDIOVASCULAR: Regular rate and rhythm; No murmurs; no peripheral edema  GI: Soft, nontender, non distended, normal bowel sounds  SKIN: Dry, intact, dry feet  MUSCULOSKELETAL: Full range of motion, normal strength  NEURO: sensation intact on plantar surface of feet, extraocular movements intact, no tremor  PSYCH: Answering questions appropriately, flat affect and  mood  CUSHING'S SIGNS: no striae, no acanthosis, no dorsocervical fat pad    CAPILLARY BLOOD GLUCOSE      POCT Blood Glucose.: 307 mg/dL (2022 11:41)  POCT Blood Glucose.: 185 mg/dL (2022 08:09)  POCT Blood Glucose.: 167 mg/dL (2022 06:51)  POCT Blood Glucose.: 149 mg/dL (2022 06:11)  POCT Blood Glucose.: 113 mg/dL (2022 05:07)  POCT Blood Glucose.: 106 mg/dL (2022 04:04)  POCT Blood Glucose.: 112 mg/dL (2022 03:31)  POCT Blood Glucose.: 139 mg/dL (2022 03:07)  POCT Blood Glucose.: 66 mg/dL (2022 02:29)  POCT Blood Glucose.: 55 mg/dL (2022 02:27)  POCT Blood Glucose.: 95 mg/dL (2022 01:07)  POCT Blood Glucose.: 123 mg/dL (2022 23:58)  POCT Blood Glucose.: 161 mg/dL (2022 23:03)  POCT Blood Glucose.: 195 mg/dL (2022 22:02)  POCT Blood Glucose.: 198 mg/dL (2022 21:02)  POCT Blood Glucose.: 230 mg/dL (2022 20:01)  POCT Blood Glucose.: 258 mg/dL (2022 19:00)  POCT Blood Glucose.: 342 mg/dL (2022 18:11)  POCT Blood Glucose.: 367 mg/dL (2022 16:55)  POCT Blood Glucose.: 408 mg/dL (2022 15:38)      A1C with Estimated Average Glucose Result: 10.6 % (10-13-22 @ 08:18)                            12.1   5.31  )-----------( 102      ( 2022 02:30 )             35.7       -    139  |  106  |  45<H>  ----------------------------<  131<H>  4.6   |  22  |  0.99    Ca    8.6      2022 05:19  Phos  2.2     -14  Mg     2.00     11-14    TPro  5.0<L>  /  Alb  3.1<L>  /  TBili  0.5  /  DBili  <0.2  /  AST  31  /  ALT  66<H>  /  AlkPhos  43        Thyroid Stimulating Hormone, Serum: 0.87 uIU/mL (22 @ 13:35)      LIPIDS    RADIOLOGY

## 2022-11-14 NOTE — CONSULT NOTE ADULT - ASSESSMENT
58yo M pmh HTN, T2DM, CAD with STEMI (s/p PCI on brillinta), EtOH overuse who presented to the ED with 3 to 4 days of general malaise, abdominal pain and inability to tolerate PO found to be in alcohol withdrawal and DKA. Endocrinology consulted for management of diabetes.    Poorly controlled T2DM with hyperglycemia  DKA  - HbA1c: 10.6  - Home Regimen:   - Endocrinologist:  - on admission, labs were , AG >37, bicarb <7, pH 7, BHB >9 consistent with DKA, now resolved  - lipase also elevated to 188  PLAN  - Hold oral DM agents while inpatient  - Start Lantus  units at bedtime. DO NOT HOLD IF NPO.  - Start Admelog  units TID pre-meal. HOLD IF NPO.  - Use moderate/Use low dose Admelog correction scale pre-meal  - Use moderate/Use low dose Admelog correction scale at bedtime  - Fingerstick BG before meals and bedtime  - Goal -180  - Carbohydrate consistent diet  - RD consult  Discharge plan:  - Discharge medications: ************************  - Patient will need education on how to self-administer insulin, how to check FSBG, as well as hypoglycemia management. Patient to call doctor with persistent high or low BG at home.   - Ensure patient has glucometer, test strips and lancets on discharge.  - Recommend routine outpatient ophthalmology, podiatry and endocrinology f/u    HTN  - Home regimen:  PLAN  - Continue  - Outpatient goal BP <130/80. Management per primary team.    HLD  - Home regimen:  PLAN  - Continue  - Would likely benefit from a statin if no contraindication  - Can check lipid profile if not done recently    Discussed with primary team.    David Hoang MD, Endocrinology Fellow  Pager [phone] from 9am to 5pm. After hours and on weekends, please call [phone].   56yo M pmh HTN, T2DM, CAD with STEMI (s/p PCI on brillinta), EtOH overuse who presented to the ED with 3 to 4 days of general malaise, abdominal pain and inability to tolerate PO found to be in alcohol withdrawal and DKA. Endocrinology consulted for management of diabetes.    Poorly controlled T2DM with hyperglycemia  DKA  Pancreatitis  - HbA1c: 10.6  - Home Regimen: trulicity 0.75mg subcutaneous weekly which was started two weeks ago and insulin was discontinued (unclear if basaglar alone or with afreeza)  - Endocrinologist: does not have  - on admission, labs were , AG >37, bicarb <7, pH 7, BHB >9 consistent with DKA, now resolved  - lipase also elevated to 378, TG 94  - trulicity can cause pancreatitis as well as the patients alcohol use  PLAN  - Hold subQ DM agents while inpatient  - Continue lantus 15 units, next dose at 8am on 11/15  - If remains NPO, would do MODERATE admelog correction scale q4h  - If started on a diet, please make it carb consistent and can initiate admelog 4 units premeal (hold if NPO) as well as LOW admelog correction scale tid with meals and a separate LOW admelog correction scale at bedtime  - supportive care per primary team for pancreatitis  - Fingerstick BG either q4h or before meals and bedtime pending if he is tolerating a diet  - Goal -180  - RD consult  - f/u CT abdomen/pelvis for pancreatitis work up  Discharge plan:  - Discharge medications: Would stop trulicity given pancreatitis and alcohol use. Should be discharged on basal/bolus insulin, final recommendations pending clinical course  - Patient to call doctor with persistent high or low BG at home.   - Ensure patient has glucometer, test strips and lancets on discharge.  - Recommend routine outpatient ophthalmology, podiatry and endocrinology f/u    Will schedule a follow up appointment outpatient at the address below:  [address] [place] [address]  Phone Number: [phone]    Discussed with primary team.    David Hoang MD, Endocrinology Fellow  Pager [phone] from 9am to 5pm. After hours and on weekends, please call [phone].   56yo M pmh HTN, T2DM, CAD with STEMI (s/p PCI on brillinta), EtOH overuse who presented to the ED with 3 to 4 days of general malaise, abdominal pain and inability to tolerate PO found to be in alcohol withdrawal and DKA. Endocrinology consulted for management of diabetes.    Poorly controlled T2DM with hyperglycemia  DKA  Pancreatitis  - HbA1c: 10.6  - Home Regimen: trulicity 0.75mg subcutaneous weekly which was started two weeks ago and insulin was discontinued (unclear if basaglar alone or with afreeza)  - Endocrinologist: does not have  - on admission, labs were , AG >37, bicarb <7, pH 7, BHB >9 consistent with DKA, now resolved  - lipase also elevated to 378, TG 94  - trulicity can cause pancreatitis as well as the patients alcohol use, TG 94 should not be the cause of pancreatitis  PLAN  - Hold subQ DM agents while inpatient  - Continue lantus 15 units, next dose at 8am on 11/15, would not re-initiate insulin gtt at this time given TG not >500  - If remains NPO, would do MODERATE admelog correction scale q4h  - If started on a diet, please make it carb consistent and can initiate admelog 4 units premeal (hold if NPO) as well as LOW admelog correction scale tid with meals and a separate LOW admelog correction scale at bedtime  - supportive care per primary team for pancreatitis  - Fingerstick BG either q4h or before meals and bedtime pending if he is tolerating a diet  - Goal -180  - RD consult  - f/u CT abdomen/pelvis for pancreatitis work up  Discharge plan:  - Discharge medications: Would stop trulicity given pancreatitis and alcohol use. Should be discharged on basal/bolus insulin, final recommendations pending clinical course  - Patient to call doctor with persistent high or low BG at home.   - Ensure patient has glucometer, test strips and lancets on discharge.  - Recommend routine outpatient ophthalmology, podiatry and endocrinology f/u    Will schedule a follow up appointment outpatient at the address below:  [address] [place] [address]  Phone Number: [phone]    Discussed with primary team.    David Hoang MD, Endocrinology Fellow  Pager [phone] from 9am to 5pm. After hours and on weekends, please call [phone].   58yo M pmh HTN, T2DM, CAD with STEMI (s/p PCI on brillinta), EtOH overuse who presented to the ED with 3 to 4 days of general malaise, abdominal pain and inability to tolerate PO found to be in alcohol withdrawal and DKA. Endocrinology consulted for management of diabetes.    Poorly controlled T2DM with hyperglycemia  DKA  Pancreatitis  - HbA1c: 10.6  - Home Regimen: trulicity 0.75mg subcutaneous weekly which was started around one month ago when insulin (basaglar 20 units and afreeza 12 units bid) was discontinued   - Endocrinologist: does not have  - on admission, labs were , AG >37, bicarb <7, pH 7, BHB >9 consistent with DKA, now resolved  - lipase also elevated to 378, TG 94  - trulicity can cause pancreatitis as well as the patients alcohol use, TG 94 should not be the cause of pancreatitis  PLAN  - Hold subQ DM agents while inpatient  - Continue lantus 15 units, next dose at 8am on 11/15, would not re-initiate insulin gtt at this time given TG not >500  - If remains NPO, would do MODERATE admelog correction scale q4h  - If started on a diet, please make it carb consistent and can initiate admelog 4 units premeal (hold if NPO) as well as LOW admelog correction scale tid with meals and a separate LOW admelog correction scale at bedtime  - supportive care per primary team for pancreatitis  - Fingerstick BG either q4h or before meals and bedtime pending if he is tolerating a diet  - Goal -180  - RD consult  - f/u CT abdomen/pelvis for pancreatitis work up  Discharge plan:  - Discharge medications: Would stop trulicity given pancreatitis and alcohol use. Should be discharged on basal/bolus insulin, final recommendations pending clinical course  - Patient to call doctor with persistent high or low BG at home.   - Ensure patient has glucometer, test strips and lancets on discharge.  - Recommend routine outpatient ophthalmology, podiatry and endocrinology f/u    Will schedule a follow up appointment outpatient at the address below:  [address] [place] [address]  Phone Number: [phone]    Discussed with primary team.    David Hoang MD, Endocrinology Fellow  Pager [phone] from 9am to 5pm. After hours and on weekends, please call [phone].   56yo M pmh HTN, T2DM, CAD with STEMI (s/p PCI on brillinta), EtOH overuse who presented to the ED with 3 to 4 days of general malaise, abdominal pain and inability to tolerate PO found to be in alcohol withdrawal and DKA. Endocrinology consulted for management of diabetes.    Poorly controlled T2DM with hyperglycemia  DKA  Pancreatitis  - HbA1c: 10.6  - Home Regimen: trulicity 0.75mg subcutaneous weekly which was started around one month ago when insulin (basaglar 20 units and afreeza 12 units bid) was discontinued   - Endocrinologist: does not have  - on admission, labs were , AG >37, bicarb <7, pH 7, BHB >9 consistent with DKA, now resolved  - lipase also elevated to 378, TG 94  - trulicity can cause pancreatitis as well as the patients alcohol use, TG 94 should not be the cause of pancreatitis  PLAN  - Hold subQ DM agents while inpatient  - Continue lantus 15 units, next dose at 8am on 11/15, would not re-initiate insulin gtt at this time given TG not >500  - If remains NPO, would do MODERATE admelog correction scale q4h  - If started on a diet, please make it carb consistent and can initiate admelog 4 units premeal (hold if NPO) as well as LOW admelog correction scale tid with meals and a separate LOW admelog correction scale at bedtime  - supportive care per primary team for pancreatitis  - Fingerstick BG either q4h or before meals and bedtime pending if he is tolerating a diet  - Goal -180  - RD consult  - f/u CT abdomen/pelvis for pancreatitis work up  Discharge plan:  - Discharge medications: Would stop trulicity given pancreatitis and alcohol use. Should be discharged on basal/bolus (not inhaled) insulin, final recommendations pending clinical course  - Patient to call doctor with persistent high or low BG at home.   - Ensure patient has glucometer, test strips and lancets on discharge.  - Recommend routine outpatient ophthalmology, podiatry and endocrinology f/u    Will schedule a follow up appointment outpatient at the address below:  [address] [place] [address]  Phone Number: [phone]    Discussed with primary team.    David Hoang MD, Endocrinology Fellow  Pager [phone] from 9am to 5pm. After hours and on weekends, please call [phone].

## 2022-11-14 NOTE — PATIENT PROFILE ADULT - FUNCTIONAL ASSESSMENT - DAILY ACTIVITY 4.
Reason For Visit  Hospital Pediatric Neurology follow-up visit for. MARIYA is accompanied by his mother  and grandparent .      Referred By  Referred By:   Dr. Aleida Vasquez  Fax: [phone]   Referring provider is the PCP        Quality    no exposure to secondhand smoke from cigarettes.      History of Present Illness    3 yo male, hospital f/u for seizures, afebrile x 2. MRI and EEG normal. Had another seizure 1 week ago, generalized per description, stopped on its own. He missed his AM dose of Keppra at the time. Having side effects of drowsiness after taking the dose.      Review of Systems    All other systems reviewed and negative.      Allergies  No Known Drug Allergies    Current Meds   · Hydrocortisone 2.5 % External Ointment; APPLY SPARINGLY TO THE AFFECTED  AREA(S) TWICE DAILY;  Therapy: [id number] to (Last Rx:[id number])  Requested for: [id number] Ordered   · Triamcinolone Acetonide 0.025 % External Ointment; APPLY SPARINGLY TO AFFECTED  AREA(S) 2 TO 3 TIMES DAILY;  Therapy: [id number] to (Last Rx:[id number])  Requested for: [id number] Ordered    Active Problems  Eczema (L30.9)  Seizures (R56.9)    Surgical History  no history of surgery    Family History  The Patient/Family denies a family history of ADD / ADHD, autistic disorder, cerebral palsy, deaths at an early age, delayed milestones, having difficulty in school, migraine headache, seizures, sleep disorders, Tourettes / tic, birth defect, bleeding disorder, cancer, cognitive disorder, asthma, bipolar disorder, brain aneurysm / stroke, chronic disabling disease, depression, OCD, thyroid disorders, genetic diseases, heart disease, kidney disease and diabetes.   family medical history was reviewed.      Social History  He lives with his mother, father and brothers. dad works outside the home.      Vitals  Vital Signs    Recorded: 13Sep2018 02:46PM   Height 87.5 cm   Weight 13 kg   BMI Calculated 16.98   BSA Calculated 0.55   BMI  Percentile 70   2-20 Stature Percentile 18 %   2-20 Weight Percentile 37 %   Head Circumference 47.5 cm     Physical Exam  Text Template:   General: No acute distress.   HEENT: NCAT. Mucus membranes moist. No dysmorphic features  Resp: No flaring/retractions.  CV: Normal perfusion, no edema.  GI: Soft, nontender, no masses.  Skin: No neurocutaneous markings.  MS: Normal range of motion. No contractures.  Psych: Appropriate affect.     Neurological Exam:  Mental Status:     -Awake, alert    Cranial Nerves:    -I: deferred.    -II: VF intact grossly. Optic disks not visualized due to uncooperative patient    -III, IV, VI: EOMI. PERRL.    -V: Sensation intact in V1-V3 bilaterally.    -VII: Face/smile symmetric.     -VIII: Hearing intact grossly    -IX, X: Palate and uvula midline.     -XI: SCM/trapezius intact bilaterally.    -XII: Tongue midline, no fasciculations.  Motor:     -Normal muscle tone and bulk throughout.     -moves all limbs symmetrically against gravity    Sensation:     -Intact to light touch    DTRs:     -UE: 2/4 in triceps, biceps, brachioradialis.    -LE: 2/4 in patella, Achilles. No clonus.     -Toes downgoing bilaterally.    Coordination and Gait:       -Normal narrow-based gait.     Discussion/Summary    Impression:. 5 yo boy with h/o febrile seizures and 2 unprovoked seizures. Had 1 seizure since starting keppra but missed his AM dose. Having side effects of drowsiness. Mother would like a second opinion.   Patient Discussion:   -discussed option of switching to another AED, family prefers to stay with Keppra  -Increase Keppra to 1.3 ml (10 MKD) as dose is too low.   -discussed importance of not missing doses  -referral for 2nd opinion to come from PMD  -f/u in 3 months or with another neurologist per family's wishes.      Plan  Medications   LevETIRAcetam 100 MG/ML Oral Solution; 1.3 ML Twice daily    Signatures   Electronically signed by : HOLLIS TRUJILLO M.D.; Sep 13 2018  3:14PM CST (Author)     4 = No assist / stand by assistance

## 2022-11-14 NOTE — PATIENT PROFILE ADULT - FALL HARM RISK - HARM RISK INTERVENTIONS

## 2022-11-14 NOTE — PROGRESS NOTE ADULT - CRITICAL CARE ATTENDING COMMENT
Patient seen and examined at bedside with the MICU ACP. Agree with above. Patient seen and examined at bedside with the MICU ACP. Agree with above.    58yo M pmh HTN, T2DM (on insulin, trulicity), CAD (s/p PCI on brillinta), EtOH use disorder, who presented for DKA and possible ETOH withdrawal.     Patient on insulin gtt, pending transition,. not withdrawing, heavy drinker but not daily drinker. However now with abn pain and elevated lipase pending CT scan.     # Pancreatitis  # ETOH withdrawal  # DKA  # GERD  - Add on lipase, check CT of the abn, pain is minimal, no guarding  - Transition to basal bolus,  - C/W DAPT, restart BB, will restart Entresto when cr stable after CT scan  - Start PPI, carafate  - DVT ppx with SQH  - GOC - full code    D/W MICU ACP.

## 2022-11-14 NOTE — CONSULT NOTE ADULT - ATTENDING COMMENTS
DM2 with DKA, ETOH over use, + lipase concerning for pancreatitis, trig checked ok.  Recently started Trulicity - will need to stop.  DC on basal/bolus pens. ETOH cessation reviewed.    Roxanna Pedroza MD  Division of Endocrinology  Pager: [phone]    If after 6PM or before 9AM, or on weekends/holidays, please call endocrine answering service for assistance ([phone]).  For nonurgent matters email [email or web address] for assistance.

## 2022-11-14 NOTE — PROGRESS NOTE ADULT - ASSESSMENT
56yo M pmh HTN, T2DM (on insulin, trulicity), CAD (s/p PCI on brillinta), EtOH overuse p/w - Presenting to the ED with 3 to 4 days of general malaise , ABD pain, and inability to tolerate p.o. Admitted to MICU for DKA and concern for etoh wth.     #NEURO  - No acute issues, patient is AOx3     #RESPIRATORY  - No acute issues, patient on RA     #CARDIOVASCULAR   //CAD  - Patient with hx of PCI on ASA and brillinta at home   - C/w ASA, C/w Brillinta   - C/w lipitor       #GI  - NPO Barrera now while being treated for DKA   - Patient with ABD pain and elevated lipase, would obtain CT A/P.       #RENAL/  //Electrolyte Derrangement   - Continue to monitor BMP q4 while on insulin gtt and replete as needed    #ID  //SIRS   - Patient is hypothermic, tachycardic and hypotensive in ED   - S/P Vanc & ZOsyn; 2L IVF Bolus   - C/w Zosyn   - F/U Bcx and infectious w/u    - F/U CT A/P     #HEME  - No acute issues     #ENDO  //T2DM - DKA   - POCT    -- pH 7.00/13/171. GAP > 37, Bicarb < 7; BHB: > 9   - s/p 2L IVF Bolus   > insulin gtt 6U/hr -- continue until HCO3 > 18  > Start D5 + LR once POCTFS < 250   > BMP & VBG q4h, replete lytes as needed (K, Phos)  > FS q1h  - Endo consult   - Of note , documentation shows patient was discharged with Afrezza for DM, but patient states he does not take it. He only takes Basaglar and Trulicity at home     #ETHICS  - Full code        56yo M pmh HTN, T2DM (on insulin, trulicity), CAD (s/p PCI on brillinta), EtOH overuse p/w - Presenting to the ED with 3 to 4 days of general malaise , ABD pain, and inability to tolerate p.o. Admitted to MICU for DKA and concern for etoh wth.     #NEURO  - No acute issues, patient is AOx3  -- no s & s of ETOH withdrawal    -- pt states that he does not drink continuously, but from time to time drinks  a pint of heavy liquor per week, last drink was on Tu, 11/08    #RESPIRATORY  - No acute issues, patient on RA     #CARDIOVASCULAR   //CAD  - Patient with hx of PCI on ASA and brillinta at home   - C/w ASA, C/w Brillinta   - Lipitor- held in the setting of mild transaminitis  -- Toprol XL 50 mg restarted (home med)  -- s/p 2 L bolus and D 5 NS at 175 cc/hr, _ on POCUS-- euvolemic, hold on further IVF for now   -- Entresto was held in the setting of hyperkalemia       #GI  - NPO i/s/o abdominal pain   --Lipase is trending up -- 188> 358    - Patient with ABD pain and elevated lipase, would obtain CT A/P.       #RENAL/  //Electrolyte Derangements have improved , hyperkalemia __ resolved   --  s/p 2 L bolus and D 5 NS at 175 cc/hr, _ on POCUS-- euvolemic, hold on further IVF for now   - Continue to monitor BMP daily     #ID  //SIRS   - Patient is hypothermic, tachycardic and hypotensive in ED, resolved  -- afebrile, no leucocytosis,   - S/P Vanc x 1 & Zosyn x 1 ; 2L IVF Bolus, s/p D 5 NS at 175 cc per hour,   - observe off Abx   - Bcx  11/13 -- NGTD, CXR-- NAD, + MSSA PCR,     - F/U CT A/P to r/o pancreatitis      #HEME  - No acute issues     #ENDO  //T2DM - DKA   - POCT    -- pH 7.00/13/171. GAP > 37, Bicarb < 7; BHB: > 9   -- s/p 2 L bolus and D 5 NS at 175 cc/hr, _ on POCUS-- euvolemic, hold on further IVF for now   > was transitioned off Insulin gtt at 10:16 am (11/14) , HCO3 is 20  > s/p  D5 NS at 175 cc/hr  > BMP daily replete lytes as needed (K, Phos)  > FS q 2h  - Endo consult emailed __ follow up recs   - Of note, documentation shows patient was discharged with Afrezza for DM, but patient states he does not take it. He only takes Basaglar and Trulicity at home     #ETHICS  - Full code

## 2022-11-14 NOTE — PROVIDER CONTACT NOTE (HYPOGLYCEMIA EVENT) - NS PROVIDER CONTACT BACKGROUND-HYPO
Age: 57y    Gender: Male    POCT Blood Glucose:  66 mg/dL (11-14-22 @ 02:29)  55 mg/dL (11-14-22 @ 02:27)  95 mg/dL (11-14-22 @ 01:07)  123 mg/dL (11-13-22 @ 23:58)  161 mg/dL (11-13-22 @ 23:03)  195 mg/dL (11-13-22 @ 22:02)  198 mg/dL (11-13-22 @ 21:02)  230 mg/dL (11-13-22 @ 20:01)      eMAR:dextrose 50% Injectable   50 milliLiter(s) IV Push (11-14-22 @ 02:45)    insulin regular Infusion   8 mL/Hr IV Continuous (11-13-22 @ 17:49)   6 mL/Hr IV Continuous (11-13-22 @ 15:07)

## 2022-11-14 NOTE — PROGRESS NOTE ADULT - SUBJECTIVE AND OBJECTIVE BOX
Significant recent/past 24 hr events:    Subjective:    Review of Systems         [ ] A ten-point review of systems was otherwise negative except as noted.  [ ] Due to altered mental status/intubation, subjective information were not able to be obtained from the patient. History was obtained, to the extent possible, from review of the chart and collateral sources of information.      Patient is a 57y old  Male who presents with a chief complaint of DKA/Etoh WTH (13 Nov 2022 15:54)    HPI:  56yo M pmh HTN, T2DM (on insulin, trulicity), CAD (s/p PCI on brillinta), EtOH overuse p/w - Presenting to the ED with 3 to 4 days of general malaise , ABD pain, and inability to tolerate p.o.  Patient reports symptoms have been gradual in onset.  Last alcoholic drink was 4 days ago (Tuesday). He ususally drinks a pint of liquor on the weekends. Has not been hospitalized for Etoh withdrawal.  Has abdominal discomfort throughout the abdomen with no associated change in bowel movements or urine symptoms. He says he has been compliant with his DM medications (He takes Basaglar and Trulicity). Denies SOB, CP, Diarrhea, melena, hematochezia, hematemesis     In ED: Received Calcium gluc, Valium 10, Zosyn, Vanc, 2L Bolus IVF  (13 Nov 2022 15:54)    PAST MEDICAL & SURGICAL HISTORY:  Diabetes      HTN (hypertension)      EtOH dependence      Transient cerebral ischemic attack      CAD (coronary artery disease)      Prophylactic measure      Status post placement of implantable loop recorder        FAMILY HISTORY:  FH: stroke (Mother)    FH: diabetes mellitus (Mother)        Vitals   ICU Vital Signs Last 24 Hrs  T(C): 36.4 (14 Nov 2022 04:00), Max: 37.3 (14 Nov 2022 00:00)  T(F): 97.6 (14 Nov 2022 04:00), Max: 99.1 (14 Nov 2022 00:00)  HR: 86 (14 Nov 2022 07:00) (86 - 111)  BP: 112/73 (14 Nov 2022 06:00) (85/54 - 120/79)  BP(mean): 84 (14 Nov 2022 06:00) (64 - 88)  ABP: --  ABP(mean): --  RR: 12 (14 Nov 2022 07:00) (12 - 24)  SpO2: 100% (14 Nov 2022 07:00) (97% - 100%)    O2 Parameters below as of 14 Nov 2022 07:00  Patient On (Oxygen Delivery Method): room air            Physical Exam:   Constitutional: NAD, well-groomed, well-developed  HEENT: PERRLA, EOMI, no drainage or redness  Neck: supple,  No JVD, Trachea midline  Back: Normal spine flexure, No CVA tenderness, No deformity or limitation of movement  Respiratory: Breath Sounds equal & clear bilaterally to auscultation, no accessory muscle use noted  Cardiovascular: Regular rate, regular rhythm, normal S1, S2; no murmurs or rub  Gastrointestinal: Soft, non-tender, non distended, no hepatosplenomegaly, normal bowel sounds  Extremities: CALHOUN x 4, no peripheral edema, no cyanosis, no clubbing   Vascular: Equal and normal pulses: 2+ peripheral pulses throughout  Neurological: A+O x 3; speech clear and intact; no sensory, motor  deficits, normal reflexes  Psychiatric: calm, normal mood, normal affect  Musculoskeletal: No joint swelling or deformity; no limitation of movement  Skin: warm, dry, well perfused, no rashes    VENT SETTINGS         I&O's Detail    13 Nov 2022 07:01  -  14 Nov 2022 07:00  --------------------------------------------------------  IN:    dextrose 5% + sodium chloride 0.9%: 1650 mL    Insulin: 2 mL    Insulin: 12 mL    Insulin: 62 mL    IV PiggyBack: 600 mL    Sodium Chloride 0.9% Bolus: 1000 mL  Total IN: 3326 mL    OUT:    Voided (mL): 2125 mL  Total OUT: 2125 mL    Total NET: 1201 mL          LABS                        12.1   5.31  )-----------( 102      ( 14 Nov 2022 02:30 )             35.7     11-14    139  |  106  |  45<H>  ----------------------------<  131<H>  4.6   |  22  |  0.99    Ca    8.6      14 Nov 2022 05:19  Phos  2.2     11-14  Mg     2.00     11-14    TPro  5.0<L>  /  Alb  3.1<L>  /  TBili  0.5  /  DBili  <0.2  /  AST  31  /  ALT  66<H>  /  AlkPhos  43  11-14    LIVER FUNCTIONS - ( 14 Nov 2022 02:30 )  Alb: 3.1 g/dL / Pro: 5.0 g/dL / ALK PHOS: 43 U/L / ALT: 66 U/L / AST: 31 U/L / GGT: x             CARDIAC MARKERS ( 13 Nov 2022 21:00 )   U/L / CKMB16.3 ng/mL / Troponin Tx     /  CARDIAC MARKERS ( 13 Nov 2022 17:07 )   U/L / CKMB16.6 ng/mL / Troponin Tx     /          POCT Blood Glucose.: 167 mg/dL *H* (11-14-22 @ 06:51)  POCT Blood Glucose.: 149 mg/dL *H* (11-14-22 @ 06:11)  POCT Blood Glucose.: 113 mg/dL *H* (11-14-22 @ 05:07)  POCT Blood Glucose.: 106 mg/dL *H* (11-14-22 @ 04:04)  POCT Blood Glucose.: 112 mg/dL *H* (11-14-22 @ 03:31)  POCT Blood Glucose.: 139 mg/dL *H* (11-14-22 @ 03:07)  POCT Blood Glucose.: 66 mg/dL *L* (11-14-22 @ 02:29)  POCT Blood Glucose.: 55 mg/dL *L* (11-14-22 @ 02:27)  POCT Blood Glucose.: 95 mg/dL (11-14-22 @ 01:07)  POCT Blood Glucose.: 123 mg/dL *H* (11-13-22 @ 23:58)  POCT Blood Glucose.: 161 mg/dL *H* (11-13-22 @ 23:03)  POCT Blood Glucose.: 195 mg/dL *H* (11-13-22 @ 22:02)  POCT Blood Glucose.: 198 mg/dL *H* (11-13-22 @ 21:02)  POCT Blood Glucose.: 230 mg/dL *H* (11-13-22 @ 20:01)  POCT Blood Glucose.: 258 mg/dL *H* (11-13-22 @ 19:00)  POCT Blood Glucose.: 342 mg/dL *H* (11-13-22 @ 18:11)  POCT Blood Glucose.: 367 mg/dL *H* (11-13-22 @ 16:55)  POCT Blood Glucose.: 408 mg/dL *H* (11-13-22 @ 15:38)  POCT Blood Glucose.: 309 mg/dL *H* (11-13-22 @ 12:27)        MEDICATIONS  (STANDING):  aspirin  chewable 81 milliGRAM(s) Oral daily  chlorhexidine 4% Liquid 1 Application(s) Topical <User Schedule>  dextrose 5% + sodium chloride 0.9%. 1000 milliLiter(s) (175 mL/Hr) IV Continuous <Continuous>  dextrose 5%. 1000 milliLiter(s) (50 mL/Hr) IV Continuous <Continuous>  dextrose 5%. 1000 milliLiter(s) (100 mL/Hr) IV Continuous <Continuous>  dextrose 50% Injectable 25 Gram(s) IV Push once  dextrose 50% Injectable 12.5 Gram(s) IV Push once  dextrose 50% Injectable 25 Gram(s) IV Push once  folic acid Injectable 1 milliGRAM(s) IV Push daily  glucagon  Injectable 1 milliGRAM(s) IntraMuscular once  heparin   Injectable 5000 Unit(s) SubCutaneous every 8 hours  insulin glargine Injectable (LANTUS) 15 Unit(s) SubCutaneous every morning  insulin lispro (ADMELOG) corrective regimen sliding scale   SubCutaneous three times a day before meals  insulin lispro (ADMELOG) corrective regimen sliding scale   SubCutaneous at bedtime  insulin lispro Injectable (ADMELOG) 4 Unit(s) SubCutaneous three times a day before meals  insulin regular Infusion 1 Unit(s)/Hr (1 mL/Hr) IV Continuous <Continuous>  multivitamin 1 Tablet(s) Oral daily  pantoprazole   Suspension 40 milliGRAM(s) Oral daily  thiamine Injectable 100 milliGRAM(s) IV Push daily  ticagrelor 90 milliGRAM(s) Oral every 12 hours    MEDICATIONS  (PRN):  aluminum hydroxide/magnesium hydroxide/simethicone Suspension 30 milliLiter(s) Oral every 6 hours PRN Dyspepsia  dextrose Oral Gel 15 Gram(s) Oral once PRN Blood Glucose LESS THAN 70 milliGRAM(s)/deciliter      Allergies:  No Known Allergies        CRITICAL CARE TIME SPENT:  minutes of critical care time spent providing medical care for patient's acute illness/conditions that impairs at least one vital organ system and/or poses a high risk of imminent or life threatening deterioration in the patient's condition. It includes time spent evaluating and treating the patient's acute illness as well as time spent reviewing labs, radiology, discussing goals of care with patient and/or patient's family, and discussing the case with a multidisciplinary team, in an effort to prevent further life threatening deterioration or end organ damage. This time is independent of any procedures performed.         Significant recent/past 24 hr events: __ hypoglycemic episode overnight with FS dropped to 55, and Insulin gtt was held from 2 am to 5 am, and was restarted at 1 U/hr  pt was transitioned off Insulin gtt, and started on a diet; pt with worsening abdominal pain with eating, and was placed back on NPO status,     Subjective: pt c/o mild burning/ acid reflux sxs     Review of Systems         [ ] A ten-point review of systems was otherwise negative except as noted.  [ ] Due to altered mental status/intubation, subjective information were not able to be obtained from the patient. History was obtained, to the extent possible, from review of the chart and collateral sources of information.      Patient is a 57y old  Male who presents with a chief complaint of DKA/Etoh WTH (13 Nov 2022 15:54)    HPI:  56yo M pmh HTN, T2DM (on insulin, trulicity), CAD (s/p PCI on brillinta), EtOH overuse p/w - Presenting to the ED with 3 to 4 days of general malaise , ABD pain, and inability to tolerate p.o.  Patient reports symptoms have been gradual in onset.  Last alcoholic drink was 4 days ago (Tuesday). He ususally drinks a pint of liquor on the weekends. Has not been hospitalized for Etoh withdrawal.  Has abdominal discomfort throughout the abdomen with no associated change in bowel movements or urine symptoms. He says he has been compliant with his DM medications (He takes Basaglar and Trulicity). Denies SOB, CP, Diarrhea, melena, hematochezia, hematemesis     In ED: Received Calcium gluc, Valium 10, Zosyn, Vanc, 2L Bolus IVF  (13 Nov 2022 15:54)    PAST MEDICAL & SURGICAL HISTORY:  Diabetes      HTN (hypertension)      EtOH dependence      Transient cerebral ischemic attack      CAD (coronary artery disease)      Prophylactic measure      Status post placement of implantable loop recorder        FAMILY HISTORY:  FH: stroke (Mother)    FH: diabetes mellitus (Mother)        Vitals   ICU Vital Signs Last 24 Hrs  T(C): 36.4 (14 Nov 2022 04:00), Max: 37.3 (14 Nov 2022 00:00)  T(F): 97.6 (14 Nov 2022 04:00), Max: 99.1 (14 Nov 2022 00:00)  HR: 86 (14 Nov 2022 07:00) (86 - 111)  BP: 112/73 (14 Nov 2022 06:00) (85/54 - 120/79)  BP(mean): 84 (14 Nov 2022 06:00) (64 - 88)  ABP: --  ABP(mean): --  RR: 12 (14 Nov 2022 07:00) (12 - 24)  SpO2: 100% (14 Nov 2022 07:00) (97% - 100%)    O2 Parameters below as of 14 Nov 2022 07:00  Patient On (Oxygen Delivery Method): room air            Physical Exam:   Constitutional: NAD, well-groomed, well-developed  HEENT: PERRLA, EOMI, no drainage or redness  Neck: supple,  No JVD, Trachea midline  Back: Normal spine flexure, No CVA tenderness, No deformity or limitation of movement  Respiratory: Breath Sounds equal & clear bilaterally to auscultation, no accessory muscle use noted  Cardiovascular: Regular rate, regular rhythm, normal S1, S2; no murmurs or rub  Gastrointestinal: Soft, non-tender, non distended, no hepatosplenomegaly, normal bowel sounds  Extremities: CALHOUN x 4, no peripheral edema, no cyanosis, no clubbing   Vascular: Equal and normal pulses: 2+ peripheral pulses throughout  Neurological: A+O x 3; speech clear and intact; no sensory, motor  deficits, normal reflexes  Psychiatric: calm, normal mood, normal affect  Musculoskeletal: No joint swelling or deformity; no limitation of movement  Skin: warm, dry, well perfused, no rashes    VENT SETTINGS         I&O's Detail    13 Nov 2022 07:01  -  14 Nov 2022 07:00  --------------------------------------------------------  IN:    dextrose 5% + sodium chloride 0.9%: 1650 mL    Insulin: 2 mL    Insulin: 12 mL    Insulin: 62 mL    IV PiggyBack: 600 mL    Sodium Chloride 0.9% Bolus: 1000 mL  Total IN: 3326 mL    OUT:    Voided (mL): 2125 mL  Total OUT: 2125 mL    Total NET: 1201 mL          LABS                        12.1   5.31  )-----------( 102      ( 14 Nov 2022 02:30 )             35.7     11-14    139  |  106  |  45<H>  ----------------------------<  131<H>  4.6   |  22  |  0.99    Ca    8.6      14 Nov 2022 05:19  Phos  2.2     11-14  Mg     2.00     11-14    TPro  5.0<L>  /  Alb  3.1<L>  /  TBili  0.5  /  DBili  <0.2  /  AST  31  /  ALT  66<H>  /  AlkPhos  43  11-14    LIVER FUNCTIONS - ( 14 Nov 2022 02:30 )  Alb: 3.1 g/dL / Pro: 5.0 g/dL / ALK PHOS: 43 U/L / ALT: 66 U/L / AST: 31 U/L / GGT: x             CARDIAC MARKERS ( 13 Nov 2022 21:00 )   U/L / CKMB16.3 ng/mL / Troponin Tx     /  CARDIAC MARKERS ( 13 Nov 2022 17:07 )   U/L / CKMB16.6 ng/mL / Troponin Tx     /          POCT Blood Glucose.: 167 mg/dL *H* (11-14-22 @ 06:51)  POCT Blood Glucose.: 149 mg/dL *H* (11-14-22 @ 06:11)  POCT Blood Glucose.: 113 mg/dL *H* (11-14-22 @ 05:07)  POCT Blood Glucose.: 106 mg/dL *H* (11-14-22 @ 04:04)  POCT Blood Glucose.: 112 mg/dL *H* (11-14-22 @ 03:31)  POCT Blood Glucose.: 139 mg/dL *H* (11-14-22 @ 03:07)  POCT Blood Glucose.: 66 mg/dL *L* (11-14-22 @ 02:29)  POCT Blood Glucose.: 55 mg/dL *L* (11-14-22 @ 02:27)  POCT Blood Glucose.: 95 mg/dL (11-14-22 @ 01:07)  POCT Blood Glucose.: 123 mg/dL *H* (11-13-22 @ 23:58)  POCT Blood Glucose.: 161 mg/dL *H* (11-13-22 @ 23:03)  POCT Blood Glucose.: 195 mg/dL *H* (11-13-22 @ 22:02)  POCT Blood Glucose.: 198 mg/dL *H* (11-13-22 @ 21:02)  POCT Blood Glucose.: 230 mg/dL *H* (11-13-22 @ 20:01)  POCT Blood Glucose.: 258 mg/dL *H* (11-13-22 @ 19:00)  POCT Blood Glucose.: 342 mg/dL *H* (11-13-22 @ 18:11)  POCT Blood Glucose.: 367 mg/dL *H* (11-13-22 @ 16:55)  POCT Blood Glucose.: 408 mg/dL *H* (11-13-22 @ 15:38)  POCT Blood Glucose.: 309 mg/dL *H* (11-13-22 @ 12:27)        MEDICATIONS  (STANDING):  aspirin  chewable 81 milliGRAM(s) Oral daily  chlorhexidine 4% Liquid 1 Application(s) Topical <User Schedule>  dextrose 5% + sodium chloride 0.9%. 1000 milliLiter(s) (175 mL/Hr) IV Continuous <Continuous>  dextrose 5%. 1000 milliLiter(s) (50 mL/Hr) IV Continuous <Continuous>  dextrose 5%. 1000 milliLiter(s) (100 mL/Hr) IV Continuous <Continuous>  dextrose 50% Injectable 25 Gram(s) IV Push once  dextrose 50% Injectable 12.5 Gram(s) IV Push once  dextrose 50% Injectable 25 Gram(s) IV Push once  folic acid Injectable 1 milliGRAM(s) IV Push daily  glucagon  Injectable 1 milliGRAM(s) IntraMuscular once  heparin   Injectable 5000 Unit(s) SubCutaneous every 8 hours  insulin glargine Injectable (LANTUS) 15 Unit(s) SubCutaneous every morning  insulin lispro (ADMELOG) corrective regimen sliding scale   SubCutaneous three times a day before meals  insulin lispro (ADMELOG) corrective regimen sliding scale   SubCutaneous at bedtime  insulin lispro Injectable (ADMELOG) 4 Unit(s) SubCutaneous three times a day before meals  insulin regular Infusion 1 Unit(s)/Hr (1 mL/Hr) IV Continuous <Continuous>  multivitamin 1 Tablet(s) Oral daily  pantoprazole   Suspension 40 milliGRAM(s) Oral daily  thiamine Injectable 100 milliGRAM(s) IV Push daily  ticagrelor 90 milliGRAM(s) Oral every 12 hours    MEDICATIONS  (PRN):  aluminum hydroxide/magnesium hydroxide/simethicone Suspension 30 milliLiter(s) Oral every 6 hours PRN Dyspepsia  dextrose Oral Gel 15 Gram(s) Oral once PRN Blood Glucose LESS THAN 70 milliGRAM(s)/deciliter      Allergies:  No Known Allergies        CRITICAL CARE TIME SPENT: 40 minutes of critical care time spent providing medical care for patient's acute illness/conditions that impairs at least one vital organ system and/or poses a high risk of imminent or life threatening deterioration in the patient's condition. It includes time spent evaluating and treating the patient's acute illness as well as time spent reviewing labs, radiology, discussing goals of care with patient and/or patient's family, and discussing the case with a multidisciplinary team, in an effort to prevent further life threatening deterioration or end organ damage. This time is independent of any procedures performed.

## 2022-11-15 DIAGNOSIS — E78.5 HYPERLIPIDEMIA, UNSPECIFIED: ICD-10-CM

## 2022-11-15 LAB
ALBUMIN SERPL ELPH-MCNC: 2.7 G/DL — LOW (ref 3.3–5)
ALP SERPL-CCNC: 40 U/L — SIGNIFICANT CHANGE UP (ref 40–120)
ALT FLD-CCNC: 55 U/L — HIGH (ref 4–41)
ANION GAP SERPL CALC-SCNC: 10 MMOL/L — SIGNIFICANT CHANGE UP (ref 7–14)
AST SERPL-CCNC: 36 U/L — SIGNIFICANT CHANGE UP (ref 4–40)
BASE EXCESS BLDV CALC-SCNC: 3.5 MMOL/L — HIGH (ref -2–3)
BASOPHILS # BLD AUTO: 0.02 K/UL — SIGNIFICANT CHANGE UP (ref 0–0.2)
BASOPHILS NFR BLD AUTO: 0.5 % — SIGNIFICANT CHANGE UP (ref 0–2)
BILIRUB DIRECT SERPL-MCNC: <0.2 MG/DL — SIGNIFICANT CHANGE UP (ref 0–0.3)
BILIRUB INDIRECT FLD-MCNC: >0.1 MG/DL — SIGNIFICANT CHANGE UP (ref 0–1)
BILIRUB SERPL-MCNC: 0.3 MG/DL — SIGNIFICANT CHANGE UP (ref 0.2–1.2)
BUN SERPL-MCNC: 18 MG/DL — SIGNIFICANT CHANGE UP (ref 7–23)
CA-I SERPL-SCNC: 1.15 MMOL/L — SIGNIFICANT CHANGE UP (ref 1.15–1.33)
CALCIUM SERPL-MCNC: 7.8 MG/DL — LOW (ref 8.4–10.5)
CHLORIDE BLDV-SCNC: 99 MMOL/L — SIGNIFICANT CHANGE UP (ref 96–108)
CHLORIDE SERPL-SCNC: 98 MMOL/L — SIGNIFICANT CHANGE UP (ref 98–107)
CO2 BLDV-SCNC: 29.9 MMOL/L — HIGH (ref 22–26)
CO2 SERPL-SCNC: 25 MMOL/L — SIGNIFICANT CHANGE UP (ref 22–31)
CREAT SERPL-MCNC: 0.72 MG/DL — SIGNIFICANT CHANGE UP (ref 0.5–1.3)
EGFR: 108 ML/MIN/1.73M2 — SIGNIFICANT CHANGE UP
EOSINOPHIL # BLD AUTO: 0.18 K/UL — SIGNIFICANT CHANGE UP (ref 0–0.5)
EOSINOPHIL NFR BLD AUTO: 4.6 % — SIGNIFICANT CHANGE UP (ref 0–6)
FOLATE SERPL-MCNC: 20 NG/ML — HIGH (ref 3.1–17.5)
GAS PNL BLDV: 130 MMOL/L — LOW (ref 136–145)
GAS PNL BLDV: SIGNIFICANT CHANGE UP
GLUCOSE BLDC GLUCOMTR-MCNC: 213 MG/DL — HIGH (ref 70–99)
GLUCOSE BLDC GLUCOMTR-MCNC: 225 MG/DL — HIGH (ref 70–99)
GLUCOSE BLDC GLUCOMTR-MCNC: 245 MG/DL — HIGH (ref 70–99)
GLUCOSE BLDC GLUCOMTR-MCNC: 248 MG/DL — HIGH (ref 70–99)
GLUCOSE BLDC GLUCOMTR-MCNC: 251 MG/DL — HIGH (ref 70–99)
GLUCOSE BLDV-MCNC: 273 MG/DL — HIGH (ref 70–99)
GLUCOSE SERPL-MCNC: 265 MG/DL — HIGH (ref 70–99)
HAPTOGLOB SERPL-MCNC: 161 MG/DL — SIGNIFICANT CHANGE UP (ref 34–200)
HCO3 BLDV-SCNC: 28 MMOL/L — SIGNIFICANT CHANGE UP (ref 22–29)
HCT VFR BLD CALC: 31.8 % — LOW (ref 39–50)
HCT VFR BLD CALC: 32.4 % — LOW (ref 39–50)
HCT VFR BLDA CALC: 32 % — LOW (ref 39–51)
HGB BLD CALC-MCNC: 10.6 G/DL — LOW (ref 13–17)
HGB BLD-MCNC: 10.7 G/DL — LOW (ref 13–17)
HGB BLD-MCNC: 11 G/DL — LOW (ref 13–17)
IANC: 1.98 K/UL — SIGNIFICANT CHANGE UP (ref 1.8–7.4)
IMM GRANULOCYTES NFR BLD AUTO: 0.3 % — SIGNIFICANT CHANGE UP (ref 0–0.9)
LACTATE BLDV-MCNC: 0.7 MMOL/L — SIGNIFICANT CHANGE UP (ref 0.5–2)
LDH SERPL L TO P-CCNC: 194 U/L — SIGNIFICANT CHANGE UP (ref 135–225)
LIDOCAIN IGE QN: 436 U/L — HIGH (ref 7–60)
LYMPHOCYTES # BLD AUTO: 1.47 K/UL — SIGNIFICANT CHANGE UP (ref 1–3.3)
LYMPHOCYTES # BLD AUTO: 37.4 % — SIGNIFICANT CHANGE UP (ref 13–44)
MAGNESIUM SERPL-MCNC: 1.6 MG/DL — SIGNIFICANT CHANGE UP (ref 1.6–2.6)
MANUAL SMEAR VERIFICATION: SIGNIFICANT CHANGE UP
MCHC RBC-ENTMCNC: 29.8 PG — SIGNIFICANT CHANGE UP (ref 27–34)
MCHC RBC-ENTMCNC: 30.1 PG — SIGNIFICANT CHANGE UP (ref 27–34)
MCHC RBC-ENTMCNC: 33.6 GM/DL — SIGNIFICANT CHANGE UP (ref 32–36)
MCHC RBC-ENTMCNC: 34 GM/DL — SIGNIFICANT CHANGE UP (ref 32–36)
MCV RBC AUTO: 87.8 FL — SIGNIFICANT CHANGE UP (ref 80–100)
MCV RBC AUTO: 89.6 FL — SIGNIFICANT CHANGE UP (ref 80–100)
MONOCYTES # BLD AUTO: 0.27 K/UL — SIGNIFICANT CHANGE UP (ref 0–0.9)
MONOCYTES NFR BLD AUTO: 6.9 % — SIGNIFICANT CHANGE UP (ref 2–14)
NEUTROPHILS # BLD AUTO: 1.98 K/UL — SIGNIFICANT CHANGE UP (ref 1.8–7.4)
NEUTROPHILS NFR BLD AUTO: 50.3 % — SIGNIFICANT CHANGE UP (ref 43–77)
NEUTS BAND # BLD: 0.9 % — SIGNIFICANT CHANGE UP (ref 0–6)
NRBC # BLD: 0 /100 WBCS — SIGNIFICANT CHANGE UP (ref 0–0)
NRBC # BLD: 0 /100 WBCS — SIGNIFICANT CHANGE UP (ref 0–0)
NRBC # FLD: 0 K/UL — SIGNIFICANT CHANGE UP (ref 0–0)
NRBC # FLD: 0 K/UL — SIGNIFICANT CHANGE UP (ref 0–0)
PCO2 BLDV: 44 MMHG — SIGNIFICANT CHANGE UP (ref 42–55)
PH BLDV: 7.42 — SIGNIFICANT CHANGE UP (ref 7.32–7.43)
PHOSPHATE SERPL-MCNC: 2.1 MG/DL — LOW (ref 2.5–4.5)
PLAT MORPH BLD: NORMAL — SIGNIFICANT CHANGE UP
PLATELET # BLD AUTO: 68 K/UL — LOW (ref 150–400)
PLATELET # BLD AUTO: 71 K/UL — LOW (ref 150–400)
PLATELET COUNT - ESTIMATE: ABNORMAL
PO2 BLDV: 78 MMHG — SIGNIFICANT CHANGE UP
POTASSIUM BLDV-SCNC: 3.6 MMOL/L — SIGNIFICANT CHANGE UP (ref 3.5–5.1)
POTASSIUM SERPL-MCNC: 3.7 MMOL/L — SIGNIFICANT CHANGE UP (ref 3.5–5.3)
POTASSIUM SERPL-SCNC: 3.7 MMOL/L — SIGNIFICANT CHANGE UP (ref 3.5–5.3)
PROT SERPL-MCNC: 4.6 G/DL — LOW (ref 6–8.3)
RBC # BLD: 3.55 M/UL — LOW (ref 4.2–5.8)
RBC # BLD: 3.69 M/UL — LOW (ref 4.2–5.8)
RBC # FLD: 12.1 % — SIGNIFICANT CHANGE UP (ref 10.3–14.5)
RBC # FLD: 12.2 % — SIGNIFICANT CHANGE UP (ref 10.3–14.5)
RBC BLD AUTO: NORMAL — SIGNIFICANT CHANGE UP
RETICS #: 33 K/UL — SIGNIFICANT CHANGE UP (ref 25–125)
RETICS/RBC NFR: 0.9 % — SIGNIFICANT CHANGE UP (ref 0.5–2.5)
SAO2 % BLDV: 97.8 % — SIGNIFICANT CHANGE UP
SMUDGE CELLS # BLD: PRESENT — SIGNIFICANT CHANGE UP
SODIUM SERPL-SCNC: 133 MMOL/L — LOW (ref 135–145)
VARIANT LYMPHS # BLD: 2.8 % — SIGNIFICANT CHANGE UP (ref 0–6)
VIT B12 SERPL-MCNC: 785 PG/ML — SIGNIFICANT CHANGE UP (ref 200–900)
WBC # BLD: 3.01 K/UL — LOW (ref 3.8–10.5)
WBC # BLD: 3.93 K/UL — SIGNIFICANT CHANGE UP (ref 3.8–10.5)
WBC # FLD AUTO: 3.01 K/UL — LOW (ref 3.8–10.5)
WBC # FLD AUTO: 3.93 K/UL — SIGNIFICANT CHANGE UP (ref 3.8–10.5)

## 2022-11-15 PROCEDURE — 99232 SBSQ HOSP IP/OBS MODERATE 35: CPT

## 2022-11-15 PROCEDURE — 99291 CRITICAL CARE FIRST HOUR: CPT

## 2022-11-15 RX ORDER — INSULIN GLARGINE 100 [IU]/ML
20 INJECTION, SOLUTION SUBCUTANEOUS
Refills: 0 | Status: DISCONTINUED | OUTPATIENT
Start: 2022-11-16 | End: 2022-11-17

## 2022-11-15 RX ORDER — INSULIN LISPRO 100/ML
VIAL (ML) SUBCUTANEOUS AT BEDTIME
Refills: 0 | Status: DISCONTINUED | OUTPATIENT
Start: 2022-11-15 | End: 2022-11-18

## 2022-11-15 RX ORDER — POTASSIUM CHLORIDE 20 MEQ
40 PACKET (EA) ORAL ONCE
Refills: 0 | Status: COMPLETED | OUTPATIENT
Start: 2022-11-15 | End: 2022-11-15

## 2022-11-15 RX ORDER — SENNA PLUS 8.6 MG/1
2 TABLET ORAL AT BEDTIME
Refills: 0 | Status: DISCONTINUED | OUTPATIENT
Start: 2022-11-15 | End: 2022-11-18

## 2022-11-15 RX ORDER — INSULIN LISPRO 100/ML
4 VIAL (ML) SUBCUTANEOUS
Refills: 0 | Status: DISCONTINUED | OUTPATIENT
Start: 2022-11-15 | End: 2022-11-15

## 2022-11-15 RX ORDER — ATORVASTATIN CALCIUM 80 MG/1
80 TABLET, FILM COATED ORAL AT BEDTIME
Refills: 0 | Status: DISCONTINUED | OUTPATIENT
Start: 2022-11-15 | End: 2022-11-18

## 2022-11-15 RX ORDER — CHLORHEXIDINE GLUCONATE 213 G/1000ML
1 SOLUTION TOPICAL DAILY
Refills: 0 | Status: DISCONTINUED | OUTPATIENT
Start: 2022-11-15 | End: 2022-11-18

## 2022-11-15 RX ORDER — INSULIN LISPRO 100/ML
6 VIAL (ML) SUBCUTANEOUS
Refills: 0 | Status: DISCONTINUED | OUTPATIENT
Start: 2022-11-15 | End: 2022-11-16

## 2022-11-15 RX ORDER — INSULIN LISPRO 100/ML
VIAL (ML) SUBCUTANEOUS
Refills: 0 | Status: DISCONTINUED | OUTPATIENT
Start: 2022-11-15 | End: 2022-11-18

## 2022-11-15 RX ORDER — POLYETHYLENE GLYCOL 3350 17 G/17G
17 POWDER, FOR SOLUTION ORAL DAILY
Refills: 0 | Status: DISCONTINUED | OUTPATIENT
Start: 2022-11-15 | End: 2022-11-18

## 2022-11-15 RX ORDER — INSULIN LISPRO 100/ML
VIAL (ML) SUBCUTANEOUS EVERY 6 HOURS
Refills: 0 | Status: DISCONTINUED | OUTPATIENT
Start: 2022-11-15 | End: 2022-11-15

## 2022-11-15 RX ORDER — POLYETHYLENE GLYCOL 3350 17 G/17G
17 POWDER, FOR SOLUTION ORAL DAILY
Refills: 0 | Status: DISCONTINUED | OUTPATIENT
Start: 2022-11-15 | End: 2022-11-15

## 2022-11-15 RX ORDER — MAGNESIUM SULFATE 500 MG/ML
2 VIAL (ML) INJECTION
Refills: 0 | Status: COMPLETED | OUTPATIENT
Start: 2022-11-15 | End: 2022-11-15

## 2022-11-15 RX ORDER — SACUBITRIL AND VALSARTAN 24; 26 MG/1; MG/1
1 TABLET, FILM COATED ORAL
Refills: 0 | Status: DISCONTINUED | OUTPATIENT
Start: 2022-11-15 | End: 2022-11-16

## 2022-11-15 RX ADMIN — LEVETIRACETAM 500 MILLIGRAM(S): 250 TABLET, FILM COATED ORAL at 17:06

## 2022-11-15 RX ADMIN — HEPARIN SODIUM 5000 UNIT(S): 5000 INJECTION INTRAVENOUS; SUBCUTANEOUS at 21:39

## 2022-11-15 RX ADMIN — Medication 4 UNIT(S): at 11:38

## 2022-11-15 RX ADMIN — Medication 1 TABLET(S): at 11:36

## 2022-11-15 RX ADMIN — INSULIN GLARGINE 15 UNIT(S): 100 INJECTION, SOLUTION SUBCUTANEOUS at 07:56

## 2022-11-15 RX ADMIN — SACUBITRIL AND VALSARTAN 1 TABLET(S): 24; 26 TABLET, FILM COATED ORAL at 16:17

## 2022-11-15 RX ADMIN — Medication 50 MILLIGRAM(S): at 05:27

## 2022-11-15 RX ADMIN — Medication 25 GRAM(S): at 09:57

## 2022-11-15 RX ADMIN — PANTOPRAZOLE SODIUM 40 MILLIGRAM(S): 20 TABLET, DELAYED RELEASE ORAL at 05:26

## 2022-11-15 RX ADMIN — Medication 6 UNIT(S): at 16:44

## 2022-11-15 RX ADMIN — TICAGRELOR 90 MILLIGRAM(S): 90 TABLET ORAL at 17:07

## 2022-11-15 RX ADMIN — Medication 4 UNIT(S): at 07:43

## 2022-11-15 RX ADMIN — Medication 1 MILLIGRAM(S): at 11:36

## 2022-11-15 RX ADMIN — Medication 25 GRAM(S): at 08:08

## 2022-11-15 RX ADMIN — Medication 100 MILLIGRAM(S): at 11:36

## 2022-11-15 RX ADMIN — Medication 2: at 16:44

## 2022-11-15 RX ADMIN — Medication 1 GRAM(S): at 05:26

## 2022-11-15 RX ADMIN — Medication 4: at 05:29

## 2022-11-15 RX ADMIN — Medication 85 MILLIMOLE(S): at 05:34

## 2022-11-15 RX ADMIN — ATORVASTATIN CALCIUM 80 MILLIGRAM(S): 80 TABLET, FILM COATED ORAL at 21:40

## 2022-11-15 RX ADMIN — Medication 81 MILLIGRAM(S): at 11:36

## 2022-11-15 RX ADMIN — TICAGRELOR 90 MILLIGRAM(S): 90 TABLET ORAL at 05:26

## 2022-11-15 RX ADMIN — Medication 40 MILLIEQUIVALENT(S): at 08:09

## 2022-11-15 RX ADMIN — CHLORHEXIDINE GLUCONATE 1 APPLICATION(S): 213 SOLUTION TOPICAL at 05:35

## 2022-11-15 RX ADMIN — HEPARIN SODIUM 5000 UNIT(S): 5000 INJECTION INTRAVENOUS; SUBCUTANEOUS at 13:26

## 2022-11-15 RX ADMIN — Medication 2: at 11:37

## 2022-11-15 RX ADMIN — Medication 30 MILLILITER(S): at 05:25

## 2022-11-15 RX ADMIN — Medication 1 GRAM(S): at 17:06

## 2022-11-15 RX ADMIN — HEPARIN SODIUM 5000 UNIT(S): 5000 INJECTION INTRAVENOUS; SUBCUTANEOUS at 05:27

## 2022-11-15 RX ADMIN — LEVETIRACETAM 500 MILLIGRAM(S): 250 TABLET, FILM COATED ORAL at 05:27

## 2022-11-15 NOTE — PROGRESS NOTE ADULT - NS ATTEND AMEND GEN_ALL_CORE FT
Patient seen and examined at bedside with the MICU ACP. Agree with above.    56yo M pmh HTN, T2DM (on insulin, trulicity), CAD (s/p PCI on brillinta), EtOH use disorder, who presented for DKA and possible ETOH withdrawal.     Sugars better controlled, on basal bolus. CT scan without pancreatitis, no abn pain, still with GERD. More pancytopenic. No clear etiology.     # Pancreatitis  # ETOH withdrawal  # DKA  # GERD  # pancyopenia.   - Elevated lipase but with no abn pain, CT negative for pancreatitis, atherosclerosis on JORGE, on treatment  - Transition to basal bolus,  - C/W DAPT, restart BB, restart statins. Hold off on Entresto as pt with hyperkalemia.   - Start PPI, carafate  - DVT ppx with SQH  - GOC - full code    D/W MICU ACP. Patient seen and examined at bedside with the MICU ACP. Agree with above.    58yo M pmh HTN, T2DM (on insulin, trulicity), CAD (s/p PCI on brillinta), EtOH use disorder, who presented for DKA and possible ETOH withdrawal.     Sugars better controlled, on basal bolus. CT scan without pancreatitis, no abn pain, still with GERD. More pancytopenic. No clear etiology.     # Pancreatitis  # ETOH withdrawal  # DKA  # GERD  # pancytopenia  - Unclear etiology of pancytopenia, possible 2/2 to ETOH use, will continue to monitor, no clear meds for its etiology, will check hemolysis labs, retic, B12/folate, HIV. But was normal on arrival.   - Not likely to be hit. No recent heparin use. Drop on day 1.   - Elevated lipase but with no abn pain, CT negative for pancreatitis, atherosclerosis on JORGE, on treatment  - Transition to basal bolus,  - C/W DAPT, restart BB, restart statins. Entresto was held 2/2 to ketty and hyperkalemia. Will restart today.   - C/W PPI, carafate  - DVT ppx with SQH  - GOC - full code    D/W MICU ACP.

## 2022-11-15 NOTE — CHART NOTE - NSCHARTNOTEFT_GEN_A_CORE
MICU Transfer Note    Transfer from: MICU    Transfer to: ( x ) Medicine    (  ) Telemetry     (   ) RCU        (    ) Palliative         (   ) Stroke Unit          (   ) __________________    Accepting Physician:  Signout given to:     MICU COURSE:  56yo M pmh HTN, T2DM (on insulin, trulicity), CAD (s/p PCI on brillinta), and EtOH overuse (no prior ETOH related admissions) presented to ED on 11/13 with 3 to 4 days of general malaise , ABD pain, and inability to tolerate p.o. States he was been taking DM meds as prescribed. Last alcoholic drink was Tuesday 11/8. He usually drinks a pint of liquor on the weekends. Upon presentation to ED the patient was found to have pH 7.00, AG >37, , & K 6.9. Diagnosed with DKA, started on Insulin infusion, IVF and admitted to MICU. AG closed 11/14 AM, given Lantus 15U SQ and successfully transitioned off Insulin infusion. Abd pain subsided and tolerated PO diet.     ASSESSMENT & PLAN:   NEURO/PSYCH  #ETOH overuse  - No acute issues  - no s & s of ETOH withdrawal, last drink 11/8      RESPIRATORY  - No acute issues, patient on RA     CARDIOVASCULAR   #CAD, recent NSTEMI   - hx of PCI   - Cont home ASA  - Cont home Brilinta   - Cont home liptor  - Cont home Entresto  - Cont home Toprol XL     GI  #elevated Lipase  -- Lipase uptrending -- 188> 358  >436  - CT Abd/Pelvis 11/14 negative for pancreatitis    RENAL/  #hyperkalemia __ resolved   - Continue to monitor BMP daily     ID  #neutropenia  - S/P Vanc x 1 & Zosyn x 1   - observe off Abx   - Bcx  11/13 -- NGTD,    HEME  #pancytopenia,   -  possibly in the setting of bone marrow suppression or medications induced  -- no active bleeding, continue monitoring   -- trend CBC    ENDO  #T2DM  #DKA --> Resolved  > was transitioned off Insulin gtt at 10:16 am (11/14) , HCO3 is 20  - Cont Lantus 20U SQ QD  - Cont lispro 6U SQ  TID  - Trend BMP  - Stop trulicity per Endo  - Endo recs appreciated      FOR FOLLOW UP:  Trend CBC and BMP  Trend Lipase MICU Transfer Note    Transfer from: MICU    Transfer to: ( x ) Medicine    (  ) Telemetry     (   ) RCU        (    ) Palliative         (   ) Stroke Unit          (   ) __________________    Accepting Physician: Heladio Rogers  Signout given to: Heladio Rogers    MICU COURSE:  58yo M pmh HTN, T2DM (on insulin, trulicity), CAD (s/p PCI on brillinta), and EtOH overuse (no prior ETOH related admissions) presented to ED on 11/13 with 3 to 4 days of general malaise , ABD pain, and inability to tolerate p.o. States he was been taking DM meds as prescribed. Last alcoholic drink was Tuesday 11/8. He usually drinks a pint of liquor on the weekends. Upon presentation to ED the patient was found to have pH 7.00, AG >37, , & K 6.9. Diagnosed with DKA, started on Insulin infusion, IVF and admitted to MICU. AG closed 11/14 AM, given Lantus 15U SQ and successfully transitioned off Insulin infusion. Abd pain subsided and tolerated PO diet.     ASSESSMENT & PLAN:   NEURO/PSYCH  #ETOH overuse  - No acute issues  - no s & s of ETOH withdrawal, last drink 11/8      RESPIRATORY  - No acute issues, patient on RA     CARDIOVASCULAR   #CAD, recent NSTEMI   - hx of PCI   - Cont home ASA  - Cont home Brilinta   - Cont home liptor  - Cont home Entresto  - Cont home Toprol XL     GI  #elevated Lipase  -- Lipase uptrending -- 188> 358  >436  - CT Abd/Pelvis 11/14 negative for pancreatitis    RENAL/  #hyperkalemia __ resolved   - Continue to monitor BMP daily     ID  #neutropenia  - S/P Vanc x 1 & Zosyn x 1   - observe off Abx   - Bcx  11/13 -- NGTD,    HEME  #pancytopenia,   -  possibly in the setting of bone marrow suppression or medications induced  -- no active bleeding, continue monitoring   -- trend CBC    ENDO  #T2DM  #DKA --> Resolved  > was transitioned off Insulin gtt at 10:16 am (11/14) , HCO3 is 20  - Cont Lantus 20U SQ QD  - Cont lispro 6U SQ  TID  - Trend BMP  - Stop trulicity per Endo  - Endo recs appreciated      FOR FOLLOW UP:  Trend CBC and BMP  Trend Lipase

## 2022-11-15 NOTE — PROGRESS NOTE ADULT - ASSESSMENT
56yo M pmh HTN, T2DM, CAD with STEMI (s/p PCI on brillinta), EtOH overuse who presented to the ED with 3 to 4 days of general malaise, abdominal pain and inability to tolerate PO found to be in alcohol withdrawal and DKA. Endocrinology consulted for management of diabetes.    1. Poorly controlled T2DM with hyperglycemia  Complicated by DKA and pancreatitis  On admission, labs were , AG >37, bicarb <7, pH 7, BHB >9 consistent with DKA, now resolved  Lipase also elevated to 378, TG 94  HbA1c: 10.6  Home Regimen: Trulicity 0.75mg subcutaneous weekly which was started around one month ago when insulin (Basaglar 20 units and Afreeza 12 units bid) was discontinued   Endocrinologist: does not have  Trulicity can cause pancreatitis as well as the patients alcohol use, TG 94 should not be the cause of pancreatitis    While inpatient:   BG target 140-180 mg/dl while in ICU  Remains above target  Increase to Lantus 20 units every morning (0800 daily)   Recommend to increase Admelog to 6 units TID before meals (Hold if NPO/skips meal)  Continue Admelog LOW dose scale before meals, low dose at bedtime  Consistent carbohydrate diet  Check BG before meals and bedtime  Hypoglycemia protocol   Fingerstick BG either q4h or before meals and bedtime pending if he is tolerating a diet  RD consult    Discharge Plan:   Would STOP Trulicity given pancreatitis and alcohol use. Should be discharged on basal/bolus (not inhaled) insulin, final recommendations pending clinical course  Patient to call doctor with persistent high or low BG at home.   Ensure patient has glucometer, test strips and lancets on discharge.  Recommend routine outpatient ophthalmology, podiatry and endocrinology f/u  Will schedule a follow up appointment outpatient at the address below:  [address] [place] [address]  Phone Number: [phone]    2. Pancreatitis  Followup CT abdomen/pelvis for pancreatitis work up, supportive care per primary team for pancreatitis  Last TG 94    3. HLD  LDL target less than 70  Last LDL 80 in 10/2022  Continue Atorvastatin 80 mg daily  Followup fasting lipid panel annually    Estefania Adrian  Nurse Practitioner  Division of Endocrinology & Diabetes  In house pager #[phone]/long range pager #[phone]    If before 9AM or after 6PM, or on weekends/holidays, please call endocrine answering service for assistance ([phone]).  For nonurgent matters email [email or web address] for assistance.

## 2022-11-15 NOTE — PROGRESS NOTE ADULT - SUBJECTIVE AND OBJECTIVE BOX
Chief Complaint: DM 2, DKA    History: Patient seen at bedside. Eating lunch at time of visit, tolerating well. States he is having hiccups intermittently  No nausea/vomiting, no s/s of hypoglycemia     MEDICATIONS  (STANDING):  aspirin  chewable 81 milliGRAM(s) Oral daily  atorvastatin 80 milliGRAM(s) Oral at bedtime  chlorhexidine 2% Cloths 1 Application(s) Topical daily  dextrose 5%. 1000 milliLiter(s) (50 mL/Hr) IV Continuous <Continuous>  dextrose 5%. 1000 milliLiter(s) (100 mL/Hr) IV Continuous <Continuous>  dextrose 50% Injectable 25 Gram(s) IV Push once  dextrose 50% Injectable 12.5 Gram(s) IV Push once  dextrose 50% Injectable 25 Gram(s) IV Push once  folic acid 1 milliGRAM(s) Oral daily  glucagon  Injectable 1 milliGRAM(s) IntraMuscular once  heparin   Injectable 5000 Unit(s) SubCutaneous every 8 hours  influenza   Vaccine 0.5 milliLiter(s) IntraMuscular once  insulin glargine Injectable (LANTUS) 15 Unit(s) SubCutaneous every morning  insulin lispro (ADMELOG) corrective regimen sliding scale   SubCutaneous three times a day before meals  insulin lispro (ADMELOG) corrective regimen sliding scale   SubCutaneous at bedtime  insulin lispro Injectable (ADMELOG) 6 Unit(s) SubCutaneous three times a day before meals  levETIRAcetam 500 milliGRAM(s) Oral two times a day  metoprolol succinate ER 50 milliGRAM(s) Oral daily  multivitamin 1 Tablet(s) Oral daily  pantoprazole    Tablet 40 milliGRAM(s) Oral before breakfast  sacubitril 24 mG/valsartan 26 mG 1 Tablet(s) Oral two times a day  senna 2 Tablet(s) Oral at bedtime  sucralfate 1 Gram(s) Oral two times a day  thiamine 100 milliGRAM(s) Oral daily  ticagrelor 90 milliGRAM(s) Oral every 12 hours    MEDICATIONS  (PRN):  aluminum hydroxide/magnesium hydroxide/simethicone Suspension 30 milliLiter(s) Oral every 6 hours PRN Dyspepsia  dextrose Oral Gel 15 Gram(s) Oral once PRN Blood Glucose LESS THAN 70 milliGRAM(s)/deciliter  polyethylene glycol 3350 17 Gram(s) Oral daily PRN Constipation    No Known Allergies    Review of Systems:  Cardiovascular: No chest pain  Respiratory: No SOB  GI: No nausea, vomiting  Endocrine: no hypoglycemia     PHYSICAL EXAM:  VITALS: T(C): 36.5 (11-15-22 @ 12:00)  T(F): 97.7 (11-15-22 @ 12:00), Max: 98.2 (11-15-22 @ 00:00)  HR: 70 (11-15-22 @ 15:00) (64 - 89)  BP: 117/71 (11-15-22 @ 15:00) (93/53 - 133/87)  RR:  (13 - 19)  SpO2:  (96% - 100%)  Wt(kg): --  GENERAL: NAD  EYES: No proptosis, no lid lag, anicteric  HEENT:  Atraumatic, Normocephalic, moist mucous membranes  RESPIRATORY: unlabored respirations     CAPILLARY BLOOD GLUCOSE    POCT Blood Glucose.: 248 mg/dL (15 Nov 2022 11:20)  POCT Blood Glucose.: 245 mg/dL (15 Nov 2022 05:28)  POCT Blood Glucose.: 108 mg/dL (14 Nov 2022 21:13)  POCT Blood Glucose.: 141 mg/dL (14 Nov 2022 20:23)  POCT Blood Glucose.: 140 mg/dL (14 Nov 2022 17:22)      11-15    133<L>  |  98  |  18  ----------------------------<  265<H>  3.7   |  25  |  0.72    eGFR: 108    Ca    7.8<L>      11-15  Mg     1.60     11-15  Phos  2.1     11-15    TPro  4.6<L>  /  Alb  2.7<L>  /  TBili  0.3  /  DBili  <0.2  /  AST  36  /  ALT  55<H>  /  AlkPhos  40  11-15      Thyroid Function Tests:  11-13 @ 13:35 TSH 0.87 FreeT4 -- T3 -- Anti TPO -- Anti Thyroglobulin Ab -- TSI --      A1C with Estimated Average Glucose Result: 10.6 % (10-13-22 @ 08:18)  A1C with Estimated Average Glucose Result: 10.5 % (02-25-22 @ 13:27)    Diet, Regular:   Consistent Carbohydrate Evening Snack (CSTCHOSN)  DASH/TLC Sodium & Cholesterol Restricted (DASH) (11-15-22 @ 09:28) [Active]

## 2022-11-15 NOTE — PROGRESS NOTE ADULT - ASSESSMENT
56yo M pmh HTN, T2DM (on insulin, trulicity), CAD (s/p PCI on brillinta), EtOH overuse p/w - Presenting to the ED with 3 to 4 days of general malaise , ABD pain, and inability to tolerate p.o. Admitted to MICU for DKA and concern for etoh wth.     #NEURO  - No acute issues, patient is AOx3  -- no s & s of ETOH withdrawal    -- pt states that he does not drink continuously, but from time to time drinks  a pint of heavy liquor per week, last drink was on Tu, 11/08    #RESPIRATORY  - No acute issues, patient on RA     #CARDIOVASCULAR   //CAD  - Patient with hx of PCI on ASA and brillinta at home   - C/w ASA, C/w Brillinta   - Lipitor- held in the setting of mild transaminitis  -- Toprol XL 50 mg restarted (home med)  -- s/p 2 L bolus and D 5 NS at 175 cc/hr, _ on POCUS-- euvolemic, hold on further IVF for now   -- Entresto was held in the setting of hyperkalemia       #GI  - NPO i/s/o abdominal pain   --Lipase is trending up -- 188> 358    - Patient with ABD pain and elevated lipase, would obtain CT A/P.       #RENAL/  //Electrolyte Derangements have improved , hyperkalemia __ resolved   --  s/p 2 L bolus and D 5 NS at 175 cc/hr, _ on POCUS-- euvolemic, hold on further IVF for now   - Continue to monitor BMP daily     #ID  //SIRS   - Patient is hypothermic, tachycardic and hypotensive in ED, resolved  -- afebrile, no leucocytosis,   - S/P Vanc x 1 & Zosyn x 1 ; 2L IVF Bolus, s/p D 5 NS at 175 cc per hour,   - observe off Abx   - Bcx  11/13 -- NGTD, CXR-- NAD, + MSSA PCR,     - F/U CT A/P to r/o pancreatitis      #HEME  - No acute issues     #ENDO  //T2DM - DKA   - POCT    -- pH 7.00/13/171. GAP > 37, Bicarb < 7; BHB: > 9   -- s/p 2 L bolus and D 5 NS at 175 cc/hr, _ on POCUS-- euvolemic, hold on further IVF for now   > was transitioned off Insulin gtt at 10:16 am (11/14) , HCO3 is 20  > s/p  D5 NS at 175 cc/hr  > BMP daily replete lytes as needed (K, Phos)  > FS q 2h  - Endo consult emailed __ follow up recs   - Of note, documentation shows patient was discharged with Afrezza for DM, but patient states he does not take it. He only takes Basaglar and Trulicity at home     #ETHICS  - Full code          58yo M pmh HTN, T2DM (on insulin, trulicity), CAD (s/p PCI on brillinta), EtOH overuse p/w - Presenting to the ED with 3 to 4 days of general malaise , ABD pain, and inability to tolerate p.o. Admitted to MICU for DKA and concern for etoh wth.     #NEURO  - No acute issues, patient is AOx3, calm and pleasant   -- no s & s of ETOH withdrawal    -- pt states that he does not drink continuously, but from time to time drinks  a pint of heavy liquor per week, last drink was on Tu, 11/08    #RESPIRATORY  - No acute issues, patient on RA     #CARDIOVASCULAR   //CAD  - Patient with hx of PCI on ASA and brillinta at home   - C/w ASA, C/w Brillinta   - Lipitor- held in the setting of mild transaminitis, __ restarted   -- Toprol XL 50 mg restarted (home med)  -- s/p 2 L bolus and D 5 NS at 175 cc/hr, _ on POCUS-- euvolemic, hold on further IVF for now   -- Entresto was held in the setting of hyperkalemia, also can cause thrombocytopenia__ hold for now,       #GI  - pt was NPO i/s/o abdominal pain,   -- TGL 94 , abdominal pain resolved,    --Lipase is trending up -- 188> 358  >436  - CT A/P-- no acute abdominal findings        #RENAL/  //Electrolyte Derangements have improved , hyperkalemia __ resolved   --  s/p 2 L bolus and D 5 NS at 175 cc/hr, _ on POCUS-- euvolemic, hold on further IVF for now   K-- 3.7, Mg 1.6, phos 2.1 -- supplemented   -- net UOP for LOS + 549, and for 24 hrs is neg 652, pt voided 2.6 L in 24 hrs   - Continue to monitor BMP daily       #ID  //SIRS   - Patient is hypothermic, tachycardic and hypotensive in ED, resolved  -- afebrile, no leucocytosis,   -- neutropenia, possibly in the setting of bone marrow suppression or medications induced,   - S/P Vanc x 1 & Zosyn x 1 ; 2L IVF Bolus, s/p D 5 NS at 175 cc per hour,   - observe off Abx   - Bcx  11/13 -- NGTD, CXR-- NAD, + MSSA PCR,     - CT A/P to r/o pancreatitis neg    #HEME  - pancytopenia, possibly in the setting of bone marrow suppression or medications induced,   -- consulted on alcohol cessation  - -Entresto held as possible cause of thrombocytopenia  -- Hgb-- 15.9>12.1>10.7>11  -- no active bleeding, continue monitoring     #ENDO  //T2DM - DKA   - POCT    -- pH 7.00/13/171. GAP > 37, Bicarb < 7; BHB: > 9   -- s/p 2 L bolus and D 5 NS at 175 cc/hr, _ on POCUS-- euvolemic, hold on further IVF for now   > was transitioned off Insulin gtt at 10:16 am (11/14) , HCO3 is 20  > s/p  D5 NS at 175 cc/hr  > BMP daily replete lytes as needed (K, Phos)  > FS qac and qhs  - Endo consult appreciated   - Of note, documentation shows patient was discharged with Afrezza for DM, but patient states he does not take it. He only takes Basaglar and Trulicity at home   -- as per endo recs, continue Lantus 15 U daily and increased Humalog to 6 U qac (11/15)    -- awaiting yael,l endo recs     #ETHICS  - Full code     DISPO: MICU 6          56yo M pmh HTN, T2DM (on insulin, trulicity), CAD (s/p PCI on brillinta), EtOH overuse p/w - Presenting to the ED with 3 to 4 days of general malaise , ABD pain, and inability to tolerate p.o. Admitted to MICU for DKA and concern for etoh wth.     #NEURO  - No acute issues, patient is AOx3, calm and pleasant   -- no s & s of ETOH withdrawal    -- pt states that he does not drink continuously, but from time to time drinks  a pint of heavy liquor per week, last drink was on Tu,11/08    #RESPIRATORY  - No acute issues, patient on RA     #CARDIOVASCULAR   //CAD, recent NSTEMI   - Patient with hx of PCI on ASA and Brilinta at home   - C/w ASA, C/w Brilinta   - monitor platelets count   - Lipitor- held in the setting of mild transaminitis, __ restarted   -- Toprol XL 50 mg restarted (home med)  -- s/p 2 L bolus and D 5 NS at 175 cc/hr, _ on POCUS-- euvolemic, hold on further IVF for now   -- Entresto was held in the setting of hyperkalemia, also can cause thrombocytopenia__ hold for now,       #GI  - pt was NPO i/s/o abdominal pain,   -- TGL 94 , abdominal pain resolved,    --Lipase is trending up -- 188> 358  >436  - CT A/P-- no acute abdominal findings        #RENAL/  //Electrolyte Derangements have improved , hyperkalemia __ resolved   --  s/p 2 L bolus and D 5 NS at 175 cc/hr, _ on POCUS-- euvolemic, hold on further IVF for now   K-- 3.7, Mg 1.6, phos 2.1 -- supplemented   -- net UOP for LOS + 549, and for 24 hrs is neg 652, pt voided 2.6 L in 24 hrs   - Continue to monitor BMP daily       #ID  //SIRS   - Patient is hypothermic, tachycardic and hypotensive in ED, resolved  -- afebrile, no leucocytosis,   -- neutropenia, possibly in the setting of bone marrow suppression or medications induced,   - S/P Vanc x 1 & Zosyn x 1 ; 2L IVF Bolus, s/p D 5 NS at 175 cc per hour,   - observe off Abx   - Bcx  11/13 -- NGTD, CXR-- NAD, + MSSA PCR,     - CT A/P to r/o pancreatitis neg    #HEME  - pancytopenia, possibly in the setting of bone marrow suppression or medications induced,   -- consulted on alcohol cessation  - -Entresto held as possible cause of thrombocytopenia  -- Hgb-- 15.9>12.1>10.7>11  -- haptoglobin-- 161, LDH-- 194, HIV in lab, folate in lab, Vit B 12- 785, retic count-p   -- no active bleeding, continue monitoring   -- continue holding heparin SC is plts persistently dropping    #ENDO  //T2DM - DKA   - POCT    -- pH 7.00/13/171. GAP > 37, Bicarb < 7; BHB: > 9   -- s/p 2 L bolus and D 5 NS at 175 cc/hr, _ on POCUS-- euvolemic, hold on further IVF for now   > was transitioned off Insulin gtt at 10:16 am (11/14) , HCO3 is 20  > s/p  D5 NS at 175 cc/hr  > BMP daily replete lytes as needed (K, Phos)  > FS qac and qhs  - Endo consult appreciated   - Of note, documentation shows patient was discharged with Afrezza for DM, but patient states he does not take it. He only takes Basaglar and Trulicity at home   -- as per endo recs, continue Lantus 15 U daily and increased Humalog to 6 U qac (11/15)    -- awaiting yael,l endo recs     #ETHICS  - Full code     DISPO: MICU 6

## 2022-11-15 NOTE — PROGRESS NOTE ADULT - SUBJECTIVE AND OBJECTIVE BOX
Significant recent/past 24 hr events:    Subjective:    Review of Systems         [ ] A ten-point review of systems was otherwise negative except as noted.  [ ] Due to altered mental status/intubation, subjective information were not able to be obtained from the patient. History was obtained, to the extent possible, from review of the chart and collateral sources of information.      Patient is a 57y old  Male who presents with a chief complaint of DKA/Etoh WTH (14 Nov 2022 13:01)    HPI:  58yo M pmh HTN, T2DM (on insulin, trulicity), CAD (s/p PCI on brillinta), EtOH overuse p/w - Presenting to the ED with 3 to 4 days of general malaise , ABD pain, and inability to tolerate p.o.  Patient reports symptoms have been gradual in onset.  Last alcoholic drink was 4 days ago (Tuesday). He ususally drinks a pint of liquor on the weekends. Has not been hospitalized for Etoh withdrawal.  Has abdominal discomfort throughout the abdomen with no associated change in bowel movements or urine symptoms. He says he has been compliant with his DM medications (He takes Basaglar and Trulicity). Denies SOB, CP, Diarrhea, melena, hematochezia, hematemesis     In ED: Received Calcium gluc, Valium 10, Zosyn, Vanc, 2L Bolus IVF  (13 Nov 2022 15:54)    PAST MEDICAL & SURGICAL HISTORY:  Diabetes      HTN (hypertension)      EtOH dependence      Transient cerebral ischemic attack      CAD (coronary artery disease)      Prophylactic measure      Status post placement of implantable loop recorder        FAMILY HISTORY:  FH: stroke (Mother)    FH: diabetes mellitus (Mother)        Vitals   ICU Vital Signs Last 24 Hrs  T(C): 36.3 (15 Nov 2022 04:00), Max: 36.8 (15 Nov 2022 00:00)  T(F): 97.3 (15 Nov 2022 04:00), Max: 98.2 (15 Nov 2022 00:00)  HR: 67 (15 Nov 2022 06:00) (64 - 102)  BP: 108/58 (15 Nov 2022 06:00) (93/53 - 140/82)  BP(mean): 69 (15 Nov 2022 06:00) (63 - 96)  ABP: --  ABP(mean): --  RR: 17 (15 Nov 2022 06:00) (12 - 22)  SpO2: 98% (15 Nov 2022 06:00) (96% - 100%)    O2 Parameters below as of 15 Nov 2022 06:00  Patient On (Oxygen Delivery Method): room air            Physical Exam:   Constitutional: NAD, well-groomed, well-developed  HEENT: PERRLA, EOMI, no drainage or redness  Neck: supple,  No JVD, Trachea midline  Back: Normal spine flexure, No CVA tenderness, No deformity or limitation of movement  Respiratory: Breath Sounds equal & clear bilaterally to auscultation, no accessory muscle use noted  Cardiovascular: Regular rate, regular rhythm, normal S1, S2; no murmurs or rub  Gastrointestinal: Soft, non-tender, non distended, no hepatosplenomegaly, normal bowel sounds  Extremities: CALHOUN x 4, no peripheral edema, no cyanosis, no clubbing   Vascular: Equal and normal pulses: 2+ peripheral pulses throughout  Neurological: A+O x 3; speech clear and intact; no sensory, motor  deficits, normal reflexes  Psychiatric: calm, normal mood, normal affect  Musculoskeletal: No joint swelling or deformity; no limitation of movement  Skin: warm, dry, well perfused, no rashes    VENT SETTINGS         I&O's Detail    14 Nov 2022 07:01  -  15 Nov 2022 07:00  --------------------------------------------------------  IN:    dextrose 5% + sodium chloride 0.9%: 525 mL    Insulin: 3 mL    IV PiggyBack: 500 mL    Oral Fluid: 920 mL  Total IN: 1948 mL    OUT:    Voided (mL): 2600 mL  Total OUT: 2600 mL    Total NET: -652 mL          LABS                        10.7   3.01  )-----------( 68       ( 15 Nov 2022 03:18 )             31.8     11-15    133<L>  |  98  |  18  ----------------------------<  265<H>  3.7   |  25  |  0.72    Ca    7.8<L>      15 Nov 2022 03:18  Phos  2.1     11-15  Mg     1.60     11-15    TPro  4.6<L>  /  Alb  2.7<L>  /  TBili  0.3  /  DBili  <0.2  /  AST  36  /  ALT  55<H>  /  AlkPhos  40  11-15    LIVER FUNCTIONS - ( 15 Nov 2022 03:18 )  Alb: 2.7 g/dL / Pro: 4.6 g/dL / ALK PHOS: 40 U/L / ALT: 55 U/L / AST: 36 U/L / GGT: x                     POCT Blood Glucose.: 245 mg/dL *H* (11-15-22 @ 05:28)  POCT Blood Glucose.: 108 mg/dL *H* (11-14-22 @ 21:13)  POCT Blood Glucose.: 141 mg/dL *H* (11-14-22 @ 20:23)  POCT Blood Glucose.: 140 mg/dL *H* (11-14-22 @ 17:22)  POCT Blood Glucose.: 232 mg/dL *H* (11-14-22 @ 14:14)  POCT Blood Glucose.: 307 mg/dL *H* (11-14-22 @ 11:41)  POCT Blood Glucose.: 185 mg/dL *H* (11-14-22 @ 08:09)        MEDICATIONS  (STANDING):  aspirin  chewable 81 milliGRAM(s) Oral daily  chlorhexidine 4% Liquid 1 Application(s) Topical <User Schedule>  dextrose 5%. 1000 milliLiter(s) (50 mL/Hr) IV Continuous <Continuous>  dextrose 5%. 1000 milliLiter(s) (100 mL/Hr) IV Continuous <Continuous>  dextrose 50% Injectable 25 Gram(s) IV Push once  dextrose 50% Injectable 12.5 Gram(s) IV Push once  dextrose 50% Injectable 25 Gram(s) IV Push once  folic acid 1 milliGRAM(s) Oral daily  glucagon  Injectable 1 milliGRAM(s) IntraMuscular once  heparin   Injectable 5000 Unit(s) SubCutaneous every 8 hours  influenza   Vaccine 0.5 milliLiter(s) IntraMuscular once  insulin glargine Injectable (LANTUS) 15 Unit(s) SubCutaneous every morning  insulin lispro (ADMELOG) corrective regimen sliding scale   SubCutaneous every 6 hours  insulin lispro Injectable (ADMELOG) 4 Unit(s) SubCutaneous three times a day before meals  levETIRAcetam 500 milliGRAM(s) Oral two times a day  metoprolol succinate ER 50 milliGRAM(s) Oral daily  multivitamin 1 Tablet(s) Oral daily  pantoprazole    Tablet 40 milliGRAM(s) Oral before breakfast  sucralfate 1 Gram(s) Oral two times a day  thiamine 100 milliGRAM(s) Oral daily  ticagrelor 90 milliGRAM(s) Oral every 12 hours    MEDICATIONS  (PRN):  aluminum hydroxide/magnesium hydroxide/simethicone Suspension 30 milliLiter(s) Oral every 6 hours PRN Dyspepsia  dextrose Oral Gel 15 Gram(s) Oral once PRN Blood Glucose LESS THAN 70 milliGRAM(s)/deciliter      Allergies:  No Known Allergies        CRITICAL CARE TIME SPENT:  minutes of critical care time spent providing medical care for patient's acute illness/conditions that impairs at least one vital organ system and/or poses a high risk of imminent or life threatening deterioration in the patient's condition. It includes time spent evaluating and treating the patient's acute illness as well as time spent reviewing labs, radiology, discussing goals of care with patient and/or patient's family, and discussing the case with a multidisciplinary team, in an effort to prevent further life threatening deterioration or end organ damage. This time is independent of any procedures performed.         Significant recent/past 24 hr events: pt was started on a regular diet and tolerating it well     Subjective: pt denies any complaints     Review of Systems         [ ] A ten-point review of systems was otherwise negative except as noted.  [ ] Due to altered mental status/intubation, subjective information were not able to be obtained from the patient. History was obtained, to the extent possible, from review of the chart and collateral sources of information.      Patient is a 57y old  Male who presents with a chief complaint of DKA/Etoh WTH (14 Nov 2022 13:01)    HPI:  58yo M pmh HTN, T2DM (on insulin, trulicity), CAD (s/p PCI on brillinta), EtOH overuse p/w - Presenting to the ED with 3 to 4 days of general malaise , ABD pain, and inability to tolerate p.o.  Patient reports symptoms have been gradual in onset.  Last alcoholic drink was 4 days ago (Tuesday). He ususally drinks a pint of liquor on the weekends. Has not been hospitalized for Etoh withdrawal.  Has abdominal discomfort throughout the abdomen with no associated change in bowel movements or urine symptoms. He says he has been compliant with his DM medications (He takes Basaglar and Trulicity). Denies SOB, CP, Diarrhea, melena, hematochezia, hematemesis     In ED: Received Calcium gluc, Valium 10, Zosyn, Vanc, 2L Bolus IVF  (13 Nov 2022 15:54)    PAST MEDICAL & SURGICAL HISTORY:  Diabetes      HTN (hypertension)      EtOH dependence      Transient cerebral ischemic attack      CAD (coronary artery disease)      Prophylactic measure      Status post placement of implantable loop recorder        FAMILY HISTORY:  FH: stroke (Mother)    FH: diabetes mellitus (Mother)        Vitals   ICU Vital Signs Last 24 Hrs  T(C): 36.3 (15 Nov 2022 04:00), Max: 36.8 (15 Nov 2022 00:00)  T(F): 97.3 (15 Nov 2022 04:00), Max: 98.2 (15 Nov 2022 00:00)  HR: 67 (15 Nov 2022 06:00) (64 - 102)  BP: 108/58 (15 Nov 2022 06:00) (93/53 - 140/82)  BP(mean): 69 (15 Nov 2022 06:00) (63 - 96)  ABP: --  ABP(mean): --  RR: 17 (15 Nov 2022 06:00) (12 - 22)  SpO2: 98% (15 Nov 2022 06:00) (96% - 100%)    O2 Parameters below as of 15 Nov 2022 06:00  Patient On (Oxygen Delivery Method): room air            Physical Exam:   Constitutional: NAD, well-groomed, well-developed  HEENT: PERRLA, EOMI, no drainage or redness  Neck: supple,  No JVD, Trachea midline  Back: Normal spine flexure, No CVA tenderness, No deformity or limitation of movement  Respiratory: Breath Sounds equal & clear bilaterally to auscultation, no accessory muscle use noted  Cardiovascular: Regular rate, regular rhythm, normal S1, S2; no murmurs or rub  Gastrointestinal: Soft, non-tender, non distended, no hepatosplenomegaly, normal bowel sounds  Extremities: CALHOUN x 4, no peripheral edema, no cyanosis, no clubbing   Vascular: Equal and normal pulses: 2+ peripheral pulses throughout  Neurological: A+O x 3; speech clear and intact; no sensory, motor  deficits, normal reflexes  Psychiatric: calm, normal mood, normal affect  Musculoskeletal: No joint swelling or deformity; no limitation of movement  Skin: warm, dry, well perfused, no rashes    VENT SETTINGS         I&O's Detail    14 Nov 2022 07:01  -  15 Nov 2022 07:00  --------------------------------------------------------  IN:    dextrose 5% + sodium chloride 0.9%: 525 mL    Insulin: 3 mL    IV PiggyBack: 500 mL    Oral Fluid: 920 mL  Total IN: 1948 mL    OUT:    Voided (mL): 2600 mL  Total OUT: 2600 mL    Total NET: -652 mL          LABS                        10.7   3.01  )-----------( 68       ( 15 Nov 2022 03:18 )             31.8     11-15    133<L>  |  98  |  18  ----------------------------<  265<H>  3.7   |  25  |  0.72    Ca    7.8<L>      15 Nov 2022 03:18  Phos  2.1     11-15  Mg     1.60     11-15    TPro  4.6<L>  /  Alb  2.7<L>  /  TBili  0.3  /  DBili  <0.2  /  AST  36  /  ALT  55<H>  /  AlkPhos  40  11-15    LIVER FUNCTIONS - ( 15 Nov 2022 03:18 )  Alb: 2.7 g/dL / Pro: 4.6 g/dL / ALK PHOS: 40 U/L / ALT: 55 U/L / AST: 36 U/L / GGT: x                     POCT Blood Glucose.: 245 mg/dL *H* (11-15-22 @ 05:28)  POCT Blood Glucose.: 108 mg/dL *H* (11-14-22 @ 21:13)  POCT Blood Glucose.: 141 mg/dL *H* (11-14-22 @ 20:23)  POCT Blood Glucose.: 140 mg/dL *H* (11-14-22 @ 17:22)  POCT Blood Glucose.: 232 mg/dL *H* (11-14-22 @ 14:14)  POCT Blood Glucose.: 307 mg/dL *H* (11-14-22 @ 11:41)  POCT Blood Glucose.: 185 mg/dL *H* (11-14-22 @ 08:09)        MEDICATIONS  (STANDING):  aspirin  chewable 81 milliGRAM(s) Oral daily  chlorhexidine 4% Liquid 1 Application(s) Topical <User Schedule>  dextrose 5%. 1000 milliLiter(s) (50 mL/Hr) IV Continuous <Continuous>  dextrose 5%. 1000 milliLiter(s) (100 mL/Hr) IV Continuous <Continuous>  dextrose 50% Injectable 25 Gram(s) IV Push once  dextrose 50% Injectable 12.5 Gram(s) IV Push once  dextrose 50% Injectable 25 Gram(s) IV Push once  folic acid 1 milliGRAM(s) Oral daily  glucagon  Injectable 1 milliGRAM(s) IntraMuscular once  heparin   Injectable 5000 Unit(s) SubCutaneous every 8 hours  influenza   Vaccine 0.5 milliLiter(s) IntraMuscular once  insulin glargine Injectable (LANTUS) 15 Unit(s) SubCutaneous every morning  insulin lispro (ADMELOG) corrective regimen sliding scale   SubCutaneous every 6 hours  insulin lispro Injectable (ADMELOG) 4 Unit(s) SubCutaneous three times a day before meals  levETIRAcetam 500 milliGRAM(s) Oral two times a day  metoprolol succinate ER 50 milliGRAM(s) Oral daily  multivitamin 1 Tablet(s) Oral daily  pantoprazole    Tablet 40 milliGRAM(s) Oral before breakfast  sucralfate 1 Gram(s) Oral two times a day  thiamine 100 milliGRAM(s) Oral daily  ticagrelor 90 milliGRAM(s) Oral every 12 hours    MEDICATIONS  (PRN):  aluminum hydroxide/magnesium hydroxide/simethicone Suspension 30 milliLiter(s) Oral every 6 hours PRN Dyspepsia  dextrose Oral Gel 15 Gram(s) Oral once PRN Blood Glucose LESS THAN 70 milliGRAM(s)/deciliter      Allergies:  No Known Allergies        CRITICAL CARE TIME SPENT: 40 minutes of critical care time spent providing medical care for patient's acute illness/conditions that impairs at least one vital organ system and/or poses a high risk of imminent or life threatening deterioration in the patient's condition. It includes time spent evaluating and treating the patient's acute illness as well as time spent reviewing labs, radiology, discussing goals of care with patient and/or patient's family, and discussing the case with a multidisciplinary team, in an effort to prevent further life threatening deterioration or end organ damage. This time is independent of any procedures performed.

## 2022-11-15 NOTE — CHART NOTE - NSCHARTNOTEFT_GEN_A_CORE
MAR Accept Note  Transfer to:    Accepting Attending Physician:    Assigned Room:      Patient seen and examined.   Labs and data reviewed.   No findings precluding transfer of service.       HPI/MICU COURSE:   Please refer to MICU transfer note for full details. Briefly, this is a      FOR FOLLOW-UP:      Carter Mota PGY3  Medicine Admitting Resident--[id number] MAR Accept Note  Transfer to: 9N 946D   Accepting Attending Physician: Dr. Rogers   Assigned Room:      Patient seen and examined.   Labs and data reviewed.   No findings precluding transfer of service.       HPI/MICU COURSE:   Please refer to MICU transfer note for full details. Briefly, this is a      FOR FOLLOW-UP:      Carter Mota PGY3  Medicine Admitting Resident--[id number] MAR Accept Note  Transfer to: [address] [place] [address]    Accepting Attending Physician: Dr. Rogers   Assigned Room:  946D     Patient seen and examined.   Labs and data reviewed.   No findings precluding transfer of service.       HPI/MICU COURSE:   Please refer to MICU transfer note for full details. Briefly, this is a 57-year-old man who is followed for hypertension, coronary artery disease, and type II diabetes mellitus who was admitted to the medical intensive care unit for evaluation and management of diabetic ketoacidosis. It is suspected that the patient's dka was multi-factorial in the context of ethanol use, pancreatitis, and inconsistent insulin administration (no administration of afreeza despite listed on medication reconciliation). The patient's dka resolved, and he was transitioned from an insulin infusion to basal-bolus insulin on 11/14. His abdominal pain has improved significantly. The patient was noted incidentally to have new thrombocytopenia.     FOR FOLLOW-UP: [ ] coordinate management of patient's diabetes mellitus with endocrinology team; adjust daily insulin doses as necessary; avoid restarting trulicity in the context of recent episode of acute pancreatitis   [ ] trend cbc; assessment of new thrombocytopenia is ongoing   [ ] resume home cardiac medications as hemodynamically permitted   [ ] ongoing evaluation and management of alcohol use     Carter Mota PGY3  Medicine Admitting Resident--[id number]

## 2022-11-16 DIAGNOSIS — E11.9 TYPE 2 DIABETES MELLITUS WITHOUT COMPLICATIONS: ICD-10-CM

## 2022-11-16 DIAGNOSIS — I25.10 ATHEROSCLEROTIC HEART DISEASE OF NATIVE CORONARY ARTERY WITHOUT ANGINA PECTORIS: ICD-10-CM

## 2022-11-16 DIAGNOSIS — R74.8 ABNORMAL LEVELS OF OTHER SERUM ENZYMES: ICD-10-CM

## 2022-11-16 DIAGNOSIS — F10.10 ALCOHOL ABUSE, UNCOMPLICATED: ICD-10-CM

## 2022-11-16 DIAGNOSIS — Z29.9 ENCOUNTER FOR PROPHYLACTIC MEASURES, UNSPECIFIED: ICD-10-CM

## 2022-11-16 DIAGNOSIS — D69.6 THROMBOCYTOPENIA, UNSPECIFIED: ICD-10-CM

## 2022-11-16 DIAGNOSIS — S61.411A LACERATION WITHOUT FOREIGN BODY OF RIGHT HAND, INITIAL ENCOUNTER: ICD-10-CM

## 2022-11-16 LAB
ANION GAP SERPL CALC-SCNC: 8 MMOL/L — SIGNIFICANT CHANGE UP (ref 7–14)
BASOPHILS # BLD AUTO: 0.01 K/UL — SIGNIFICANT CHANGE UP (ref 0–0.2)
BASOPHILS NFR BLD AUTO: 0.4 % — SIGNIFICANT CHANGE UP (ref 0–2)
BILIRUB SERPL-MCNC: 0.3 MG/DL — SIGNIFICANT CHANGE UP (ref 0.2–1.2)
BUN SERPL-MCNC: 18 MG/DL — SIGNIFICANT CHANGE UP (ref 7–23)
CALCIUM SERPL-MCNC: 8.2 MG/DL — LOW (ref 8.4–10.5)
CHLORIDE SERPL-SCNC: 96 MMOL/L — LOW (ref 98–107)
CO2 SERPL-SCNC: 28 MMOL/L — SIGNIFICANT CHANGE UP (ref 22–31)
CREAT SERPL-MCNC: 0.71 MG/DL — SIGNIFICANT CHANGE UP (ref 0.5–1.3)
EGFR: 108 ML/MIN/1.73M2 — SIGNIFICANT CHANGE UP
EOSINOPHIL # BLD AUTO: 0.1 K/UL — SIGNIFICANT CHANGE UP (ref 0–0.5)
EOSINOPHIL NFR BLD AUTO: 4 % — SIGNIFICANT CHANGE UP (ref 0–6)
GLUCOSE BLDC GLUCOMTR-MCNC: 194 MG/DL — HIGH (ref 70–99)
GLUCOSE BLDC GLUCOMTR-MCNC: 270 MG/DL — HIGH (ref 70–99)
GLUCOSE BLDC GLUCOMTR-MCNC: 288 MG/DL — HIGH (ref 70–99)
GLUCOSE BLDC GLUCOMTR-MCNC: 312 MG/DL — HIGH (ref 70–99)
GLUCOSE SERPL-MCNC: 203 MG/DL — HIGH (ref 70–99)
HCT VFR BLD CALC: 30.7 % — LOW (ref 39–50)
HEPARIN-PF4 AB RESULT: <0.6 U/ML — SIGNIFICANT CHANGE UP (ref 0–0.9)
HGB BLD-MCNC: 10.8 G/DL — LOW (ref 13–17)
HIV 1+2 AB+HIV1 P24 AG SERPL QL IA: SIGNIFICANT CHANGE UP
IANC: 1.05 K/UL — LOW (ref 1.8–7.4)
IMM GRANULOCYTES NFR BLD AUTO: 0 % — SIGNIFICANT CHANGE UP (ref 0–0.9)
LIDOCAIN IGE QN: 445 U/L — HIGH (ref 7–60)
LYMPHOCYTES # BLD AUTO: 1.16 K/UL — SIGNIFICANT CHANGE UP (ref 1–3.3)
LYMPHOCYTES # BLD AUTO: 46 % — HIGH (ref 13–44)
MAGNESIUM SERPL-MCNC: 2.1 MG/DL — SIGNIFICANT CHANGE UP (ref 1.6–2.6)
MCHC RBC-ENTMCNC: 30.6 PG — SIGNIFICANT CHANGE UP (ref 27–34)
MCHC RBC-ENTMCNC: 35.2 GM/DL — SIGNIFICANT CHANGE UP (ref 32–36)
MCV RBC AUTO: 87 FL — SIGNIFICANT CHANGE UP (ref 80–100)
MONOCYTES # BLD AUTO: 0.2 K/UL — SIGNIFICANT CHANGE UP (ref 0–0.9)
MONOCYTES NFR BLD AUTO: 7.9 % — SIGNIFICANT CHANGE UP (ref 2–14)
NEUTROPHILS # BLD AUTO: 1.05 K/UL — LOW (ref 1.8–7.4)
NEUTROPHILS NFR BLD AUTO: 41.7 % — LOW (ref 43–77)
NRBC # BLD: 0 /100 WBCS — SIGNIFICANT CHANGE UP (ref 0–0)
NRBC # FLD: 0 K/UL — SIGNIFICANT CHANGE UP (ref 0–0)
PF4 HEPARIN CMPLX AB SER-ACNC: NEGATIVE — SIGNIFICANT CHANGE UP
PHOSPHATE SERPL-MCNC: 2.2 MG/DL — LOW (ref 2.5–4.5)
PLATELET # BLD AUTO: 53 K/UL — LOW (ref 150–400)
POTASSIUM SERPL-MCNC: 4.3 MMOL/L — SIGNIFICANT CHANGE UP (ref 3.5–5.3)
POTASSIUM SERPL-SCNC: 4.3 MMOL/L — SIGNIFICANT CHANGE UP (ref 3.5–5.3)
RBC # BLD: 3.53 M/UL — LOW (ref 4.2–5.8)
RBC # BLD: 3.53 M/UL — LOW (ref 4.2–5.8)
RBC # FLD: 11.9 % — SIGNIFICANT CHANGE UP (ref 10.3–14.5)
RETICS #: 26.8 K/UL — SIGNIFICANT CHANGE UP (ref 25–125)
RETICS/RBC NFR: 0.8 % — SIGNIFICANT CHANGE UP (ref 0.5–2.5)
SODIUM SERPL-SCNC: 132 MMOL/L — LOW (ref 135–145)
WBC # BLD: 2.52 K/UL — LOW (ref 3.8–10.5)
WBC # FLD AUTO: 2.52 K/UL — LOW (ref 3.8–10.5)

## 2022-11-16 PROCEDURE — 88189 FLOWCYTOMETRY/READ 16 & >: CPT

## 2022-11-16 PROCEDURE — 99255 IP/OBS CONSLTJ NEW/EST HI 80: CPT | Mod: GC

## 2022-11-16 PROCEDURE — 99233 SBSQ HOSP IP/OBS HIGH 50: CPT | Mod: GC

## 2022-11-16 PROCEDURE — 99232 SBSQ HOSP IP/OBS MODERATE 35: CPT

## 2022-11-16 RX ORDER — INSULIN HUMAN 4-8-12(60)
0 KIT INHALATION
Qty: 0 | Refills: 0 | DISCHARGE

## 2022-11-16 RX ORDER — INSULIN LISPRO 100/ML
8 VIAL (ML) SUBCUTANEOUS
Refills: 0 | Status: DISCONTINUED | OUTPATIENT
Start: 2022-11-16 | End: 2022-11-17

## 2022-11-16 RX ORDER — TICAGRELOR 90 MG/1
1 TABLET ORAL
Qty: 0 | Refills: 0 | DISCHARGE

## 2022-11-16 RX ORDER — INSULIN GLARGINE 100 [IU]/ML
20 INJECTION, SOLUTION SUBCUTANEOUS
Qty: 0 | Refills: 0 | DISCHARGE

## 2022-11-16 RX ORDER — SODIUM,POTASSIUM PHOSPHATES 278-250MG
1 POWDER IN PACKET (EA) ORAL ONCE
Refills: 0 | Status: COMPLETED | OUTPATIENT
Start: 2022-11-16 | End: 2022-11-16

## 2022-11-16 RX ORDER — ASPIRIN/CALCIUM CARB/MAGNESIUM 324 MG
1 TABLET ORAL
Qty: 0 | Refills: 0 | DISCHARGE

## 2022-11-16 RX ORDER — LEVETIRACETAM 250 MG/1
1 TABLET, FILM COATED ORAL
Qty: 0 | Refills: 0 | DISCHARGE

## 2022-11-16 RX ORDER — SACUBITRIL AND VALSARTAN 24; 26 MG/1; MG/1
1 TABLET, FILM COATED ORAL
Qty: 0 | Refills: 0 | DISCHARGE

## 2022-11-16 RX ORDER — SACUBITRIL AND VALSARTAN 24; 26 MG/1; MG/1
1 TABLET, FILM COATED ORAL
Refills: 0 | Status: DISCONTINUED | OUTPATIENT
Start: 2022-11-16 | End: 2022-11-18

## 2022-11-16 RX ADMIN — Medication 50 MILLIGRAM(S): at 06:09

## 2022-11-16 RX ADMIN — Medication 6 UNIT(S): at 08:59

## 2022-11-16 RX ADMIN — ATORVASTATIN CALCIUM 80 MILLIGRAM(S): 80 TABLET, FILM COATED ORAL at 21:47

## 2022-11-16 RX ADMIN — PANTOPRAZOLE SODIUM 40 MILLIGRAM(S): 20 TABLET, DELAYED RELEASE ORAL at 06:08

## 2022-11-16 RX ADMIN — SACUBITRIL AND VALSARTAN 1 TABLET(S): 24; 26 TABLET, FILM COATED ORAL at 17:31

## 2022-11-16 RX ADMIN — Medication 8 UNIT(S): at 19:49

## 2022-11-16 RX ADMIN — Medication 100 MILLIGRAM(S): at 12:45

## 2022-11-16 RX ADMIN — Medication 1 GRAM(S): at 17:31

## 2022-11-16 RX ADMIN — Medication 4: at 12:45

## 2022-11-16 RX ADMIN — Medication 6 UNIT(S): at 12:46

## 2022-11-16 RX ADMIN — Medication 1 TABLET(S): at 12:45

## 2022-11-16 RX ADMIN — Medication 81 MILLIGRAM(S): at 12:45

## 2022-11-16 RX ADMIN — LEVETIRACETAM 500 MILLIGRAM(S): 250 TABLET, FILM COATED ORAL at 17:31

## 2022-11-16 RX ADMIN — TICAGRELOR 90 MILLIGRAM(S): 90 TABLET ORAL at 06:09

## 2022-11-16 RX ADMIN — INSULIN GLARGINE 20 UNIT(S): 100 INJECTION, SOLUTION SUBCUTANEOUS at 08:58

## 2022-11-16 RX ADMIN — HEPARIN SODIUM 5000 UNIT(S): 5000 INJECTION INTRAVENOUS; SUBCUTANEOUS at 06:09

## 2022-11-16 RX ADMIN — SACUBITRIL AND VALSARTAN 1 TABLET(S): 24; 26 TABLET, FILM COATED ORAL at 06:09

## 2022-11-16 RX ADMIN — LEVETIRACETAM 500 MILLIGRAM(S): 250 TABLET, FILM COATED ORAL at 06:08

## 2022-11-16 RX ADMIN — Medication 1: at 08:59

## 2022-11-16 RX ADMIN — Medication 1: at 22:10

## 2022-11-16 RX ADMIN — TICAGRELOR 90 MILLIGRAM(S): 90 TABLET ORAL at 17:31

## 2022-11-16 RX ADMIN — Medication 1 PACKET(S): at 12:45

## 2022-11-16 RX ADMIN — Medication 3: at 18:08

## 2022-11-16 RX ADMIN — Medication 1 MILLIGRAM(S): at 12:45

## 2022-11-16 RX ADMIN — Medication 1 GRAM(S): at 06:08

## 2022-11-16 NOTE — PROGRESS NOTE ADULT - PROBLEM SELECTOR PLAN 4
Platelets downtrending. Unclear etiology. Also with pancytopenia.  -possible 2/2 to ETOH use vs medication-induced.  -entresto mainly known to cause anemia.   - Trend for now  - consider heme consult if not resolving. Hx of inferolateral STEMI s/p BECKY to pCX 100% occlusion, pLAD 30%, dOM 100% EF 30-35%, LVEDP 36 (3/1/22), IABP placed, in 2/2022  - TTE 10/13: with EF 49%, and mild LV systolic dysfunction  - c/w Asa 81mg QD, sacubitril-valsartan 24mg-26mg BID, metoprolol succinate 50mg QD, ticagrelor 90 mg Q12H.  - c/w home ASA, Brilinalivia mcdonaldsto  - c/w home Toprol Hx of inferolateral STEMI s/p BECKY to pCX 100% occlusion, pLAD 30%, dOM 100% EF 30-35%, LVEDP 36 (3/1/22), IABP placed, in 2/2022  - TTE 10/13: with EF 49%, and mild LV systolic dysfunction  - c/w Asa 81mg QD, sacubitril-valsartan BID, metoprolol succinate 50mg QD, ticagrelor 90 mg Q12H.  - c/w home ASA, Brilinta entresto  - c/w home Toprol

## 2022-11-16 NOTE — CONSULT NOTE ADULT - SUBJECTIVE AND OBJECTIVE BOX
HPI:  58yo M pmh HTN, T2DM (on insulin, trulicity), CAD (s/p PCI on brillinta), EtOH overuse p/w - Presenting to the ED with 3 to 4 days of general malaise , ABD pain, and inability to tolerate p.o.  Patient reports symptoms have been gradual in onset.  Last alcoholic drink was 4 days ago (Tuesday). He ususally drinks a pint of liquor on the weekends. Has not been hospitalized for Etoh withdrawal.  Has abdominal discomfort throughout the abdomen with no associated change in bowel movements or urine symptoms. He says he has been compliant with his DM medications (He takes Basaglar and Trulicity). Denies SOB, CP, Diarrhea, melena, hematochezia, hematemesis     In ED: Received Calcium gluc, Valium 10, Zosyn, Vanc, 2L Bolus IVF  (13 Nov 2022 15:54)      PAST MEDICAL & SURGICAL HISTORY:  Diabetes      HTN (hypertension)      EtOH dependence      Transient cerebral ischemic attack      CAD (coronary artery disease)      Prophylactic measure      Status post placement of implantable loop recorder          Allergies    No Known Allergies    Intolerances        MEDICATIONS  (STANDING):  aspirin  chewable 81 milliGRAM(s) Oral daily  atorvastatin 80 milliGRAM(s) Oral at bedtime  chlorhexidine 2% Cloths 1 Application(s) Topical daily  dextrose 5%. 1000 milliLiter(s) (50 mL/Hr) IV Continuous <Continuous>  dextrose 5%. 1000 milliLiter(s) (100 mL/Hr) IV Continuous <Continuous>  dextrose 50% Injectable 25 Gram(s) IV Push once  dextrose 50% Injectable 12.5 Gram(s) IV Push once  dextrose 50% Injectable 25 Gram(s) IV Push once  folic acid 1 milliGRAM(s) Oral daily  glucagon  Injectable 1 milliGRAM(s) IntraMuscular once  influenza   Vaccine 0.5 milliLiter(s) IntraMuscular once  insulin glargine Injectable (LANTUS) 20 Unit(s) SubCutaneous <User Schedule>  insulin lispro (ADMELOG) corrective regimen sliding scale   SubCutaneous three times a day before meals  insulin lispro (ADMELOG) corrective regimen sliding scale   SubCutaneous at bedtime  insulin lispro Injectable (ADMELOG) 6 Unit(s) SubCutaneous three times a day before meals  levETIRAcetam 500 milliGRAM(s) Oral two times a day  metoprolol succinate ER 50 milliGRAM(s) Oral daily  multivitamin 1 Tablet(s) Oral daily  pantoprazole    Tablet 40 milliGRAM(s) Oral before breakfast  sacubitril 24 mG/valsartan 26 mG 1 Tablet(s) Oral two times a day  senna 2 Tablet(s) Oral at bedtime  sucralfate 1 Gram(s) Oral two times a day  thiamine 100 milliGRAM(s) Oral daily  ticagrelor 90 milliGRAM(s) Oral every 12 hours    MEDICATIONS  (PRN):  aluminum hydroxide/magnesium hydroxide/simethicone Suspension 30 milliLiter(s) Oral every 6 hours PRN Dyspepsia  dextrose Oral Gel 15 Gram(s) Oral once PRN Blood Glucose LESS THAN 70 milliGRAM(s)/deciliter  polyethylene glycol 3350 17 Gram(s) Oral daily PRN Constipation      FAMILY HISTORY:  FH: stroke (Mother)    FH: diabetes mellitus (Mother)        SOCIAL HISTORY: No EtOH, no tobacco    REVIEW OF SYSTEMS:    CONSTITUTIONAL: No weakness, fevers or chills  EYES/ENT: No visual changes;  No vertigo or throat pain   NECK: No pain or stiffness  RESPIRATORY: No cough, wheezing, hemoptysis; No shortness of breath  CARDIOVASCULAR: No chest pain or palpitations  GASTROINTESTINAL: No abdominal or epigastric pain. No nausea, vomiting, or hematemesis; No diarrhea or constipation. No melena or hematochezia.  GENITOURINARY: No dysuria, frequency or hematuria  NEUROLOGICAL: No numbness or weakness  SKIN: No itching, burning, rashes, or lesions   All other review of systems is negative unless indicated above.        T(F): 98 (11-16-22 @ 05:45), Max: 98.8 (11-15-22 @ 20:00)  HR: 75 (11-16-22 @ 05:45)  BP: 127/88 (11-16-22 @ 05:45)  RR: 17 (11-16-22 @ 05:45)  SpO2: 100% (11-16-22 @ 05:45)  Wt(kg): --    GENERAL: NAD, well-developed  HEAD:  Atraumatic, Normocephalic  EYES: EOMI, PERRLA, conjunctiva and sclera clear  NECK: Supple, No JVD  CHEST/LUNG: Clear to auscultation bilaterally; No wheeze  HEART: Regular rate and rhythm; No murmurs, rubs, or gallops  ABDOMEN: Soft, Nontender, Nondistended; Bowel sounds present  EXTREMITIES:  2+ Peripheral Pulses, No clubbing, cyanosis, or edema  NEUROLOGY: non-focal  SKIN: No rashes or lesions                          10.8   2.52  )-----------( 53       ( 16 Nov 2022 06:15 )             30.7       11-16    132<L>  |  96<L>  |  18  ----------------------------<  203<H>  4.3   |  28  |  0.71    Ca    8.2<L>      16 Nov 2022 06:15  Phos  2.2     11-16  Mg     2.10     11-16    TPro  4.6<L>  /  Alb  2.7<L>  /  TBili  0.3  /  DBili  <0.2  /  AST  36  /  ALT  55<H>  /  AlkPhos  40  11-15      Phosphorus Level, Serum: 2.2 mg/dL (11-16 @ 06:15)  Magnesium, Serum: 2.10 mg/dL (11-16 @ 06:15)          .Blood Blood-Venous  11-13 @ 13:30   No growth to date.  --  --      .Blood Blood-Peripheral  11-13 @ 11:00   No growth to date.  --  --

## 2022-11-16 NOTE — PHYSICAL THERAPY INITIAL EVALUATION ADULT - NSPTDISCHREC_GEN_A_CORE
Pt. not placed on skilled therapy services secondary to pt. performing at their baseline functional mobility performance. anticipate discharge to home with no skilled PT services./No skilled PT needs

## 2022-11-16 NOTE — DIETITIAN INITIAL EVALUATION ADULT - ORAL INTAKE PTA/DIET HISTORY
Patient reports height of 5'7", usual body weight 151lbs, a month ago. Patient reports decrease in appetite, could not tolerate oral intake, experienced stomach pain and vomiting for a month. On ETOH withdrawal, per chart. Patient has no known food allergies, admits try to avoid sugar and concentrated sweets PTA.

## 2022-11-16 NOTE — DIETITIAN INITIAL EVALUATION ADULT - PERTINENT LABORATORY DATA
11-16    132<L>  |  96<L>  |  18  ----------------------------<  203<H>  4.3   |  28  |  0.71    Ca    8.2<L>      16 Nov 2022 06:15  Phos  2.2     11-16  Mg     2.10     11-16    TPro  x   /  Alb  x   /  TBili  0.3  /  DBili  x   /  AST  x   /  ALT  x   /  AlkPhos  x   11-16  POCT Blood Glucose.: 312 mg/dL (11-16-22 @ 12:30)  A1C with Estimated Average Glucose Result: 10.6 % (10-13-22 @ 08:18)  A1C with Estimated Average Glucose Result: 10.5 % (02-25-22 @ 13:27)

## 2022-11-16 NOTE — PROGRESS NOTE ADULT - PROBLEM SELECTOR PLAN 5
pt states that he does not drink continuously, but from time to time drinks  a pint of heavy liquor per week, last drink was on Tu,11/08  - monitor off [facility] for now. pt states that he does not drink continuously, but from time to time drinks  a pint of heavy liquor per week, last drink was on Tu,11/08  - patient has histroy of ETOH associated seizure, on keppra at home. continue for now.   - monitor off [facility] for now. Platelets downtrending. Unclear etiology. Also with pancytopenia.  -possible 2/2 to ETOH use vs medication-induced.  -entresto mainly known to cause anemia.   - Trend for now  - consider heme consult if not resolving. Platelets downtrending. Unclear etiology. Also with pancytopenia.  -possible 2/2 to ETOH use vs medication-induced.  -entresto mainly known to cause anemia.   - f/u peripheral smear  - EBV, parvo, CMV, ADITYA, TSH, acute hep panel pending  - iron studies pending  - transfuse paltelet <!0K, <20k if febrile and <50k if bleeding  - transfuse Hgb<7  - Trend for now  - appreciate heme recs

## 2022-11-16 NOTE — PROGRESS NOTE ADULT - PROBLEM SELECTOR PLAN 6
DVT ppx: heparin SQ  Diet: CC, DASH pt states that he does not drink continuously, but from time to time drinks  a pint of heavy liquor per week, last drink was on Tu,11/08  - patient has histroy of ETOH associated seizure, on keppra at home. continue for now.   - monitor off CIWA for now. pt states that he does not drink continuously, but from time to time drinks  a pint of heavy liquor per week, last drink was on Tu,11/08  - patient has history of ETOH associated seizure, on keppra at home. continue for now.   - monitor off CIWA for now.

## 2022-11-16 NOTE — PROGRESS NOTE ADULT - SUBJECTIVE AND OBJECTIVE BOX
Chief Complaint: DM 2    History: Patient seen at bedside. Reports he is eating meals, denies n/v, denies s/s of hypoglycemia  Patient reports he ate an orange between breakfast and lunch today - lunch  mg/dl   Patient states he knows how to use insulin PENS  Reviewed basal/bolus insulin pen plan for discharge, STOPPING Trulicity and Afreeza (inhaled insulin) - patient reports understanding    MEDICATIONS  (STANDING):  aspirin  chewable 81 milliGRAM(s) Oral daily  atorvastatin 80 milliGRAM(s) Oral at bedtime  chlorhexidine 2% Cloths 1 Application(s) Topical daily  dextrose 5%. 1000 milliLiter(s) (50 mL/Hr) IV Continuous <Continuous>  dextrose 5%. 1000 milliLiter(s) (100 mL/Hr) IV Continuous <Continuous>  dextrose 50% Injectable 25 Gram(s) IV Push once  dextrose 50% Injectable 12.5 Gram(s) IV Push once  dextrose 50% Injectable 25 Gram(s) IV Push once  folic acid 1 milliGRAM(s) Oral daily  glucagon  Injectable 1 milliGRAM(s) IntraMuscular once  influenza   Vaccine 0.5 milliLiter(s) IntraMuscular once  insulin glargine Injectable (LANTUS) 20 Unit(s) SubCutaneous <User Schedule>  insulin lispro (ADMELOG) corrective regimen sliding scale   SubCutaneous three times a day before meals  insulin lispro (ADMELOG) corrective regimen sliding scale   SubCutaneous at bedtime  insulin lispro Injectable (ADMELOG) 6 Unit(s) SubCutaneous three times a day before meals  levETIRAcetam 500 milliGRAM(s) Oral two times a day  metoprolol succinate ER 50 milliGRAM(s) Oral daily  multivitamin 1 Tablet(s) Oral daily  pantoprazole    Tablet 40 milliGRAM(s) Oral before breakfast  sacubitril 24 mG/valsartan 26 mG 1 Tablet(s) Oral two times a day  senna 2 Tablet(s) Oral at bedtime  sucralfate 1 Gram(s) Oral two times a day  thiamine 100 milliGRAM(s) Oral daily  ticagrelor 90 milliGRAM(s) Oral every 12 hours    MEDICATIONS  (PRN):  aluminum hydroxide/magnesium hydroxide/simethicone Suspension 30 milliLiter(s) Oral every 6 hours PRN Dyspepsia  dextrose Oral Gel 15 Gram(s) Oral once PRN Blood Glucose LESS THAN 70 milliGRAM(s)/deciliter  polyethylene glycol 3350 17 Gram(s) Oral daily PRN Constipation    No Known Allergies    Review of Systems:  Cardiovascular: No chest pain  Respiratory: No SOB  GI: No nausea, vomiting  Endocrine: no hypoglycemia     PHYSICAL EXAM:  VITALS: T(C): 36.7 (11-16-22 @ 05:45)  T(F): 98 (11-16-22 @ 05:45), Max: 98.8 (11-15-22 @ 20:00)  HR: 75 (11-16-22 @ 05:45) (70 - 95)  BP: 127/88 (11-16-22 @ 05:45) (110/64 - 146/87)  RR:  (14 - 20)  SpO2:  (97% - 100%)  Wt(kg): --  GENERAL: NAD  EYES: No proptosis, no lid lag, anicteric  HEENT:  Atraumatic, Normocephalic, moist mucous membranes  RESPIRATORY: unlabored respirations     CAPILLARY BLOOD GLUCOSE    POCT Blood Glucose.: 312 mg/dL (16 Nov 2022 12:30)  POCT Blood Glucose.: 194 mg/dL (16 Nov 2022 08:50)  POCT Blood Glucose.: 251 mg/dL (15 Nov 2022 22:50)  POCT Blood Glucose.: 225 mg/dL (15 Nov 2022 21:39)  POCT Blood Glucose.: 213 mg/dL (15 Nov 2022 16:39)      11-16    132<L>  |  96<L>  |  18  ----------------------------<  203<H>  4.3   |  28  |  0.71    eGFR: 108    Ca    8.2<L>      11-16  Mg     2.10     11-16  Phos  2.2     11-16    TPro  x   /  Alb  x   /  TBili  0.3  /  DBili  x   /  AST  x   /  ALT  x   /  AlkPhos  x   11-16      Thyroid Function Tests:  11-13 @ 13:35 TSH 0.87 FreeT4 -- T3 -- Anti TPO -- Anti Thyroglobulin Ab -- TSI --      A1C with Estimated Average Glucose Result: 10.6 % (10-13-22 @ 08:18)  A1C with Estimated Average Glucose Result: 10.5 % (02-25-22 @ 13:27)    Diet, Regular:   Consistent Carbohydrate Evening Snack (CSTCHOSN)  DASH/TLC Sodium & Cholesterol Restricted (DASH) (11-15-22 @ 09:28) [Active]

## 2022-11-16 NOTE — DIETITIAN INITIAL EVALUATION ADULT - ADD RECOMMEND
1. Recommend adding Glucerna shake 1x/day (220kcal, 10gm protein) for nutrient support.  2. Replete phos, per MD discretion.   3. Monitor weight, labs, po intake and tolerance, bowel movement.

## 2022-11-16 NOTE — PHYSICAL THERAPY INITIAL EVALUATION ADULT - ADDITIONAL COMMENTS
Pt lives with his son in a house with 2 stairs to enter and 7 stairs to the bedroom. prior to admission pt was independent with all mobility and ambulated without an assistive device. Pt denies any falls within the last year.     Pt. left comfortable in bed, call bell in reach and in NAD.

## 2022-11-16 NOTE — DIETITIAN INITIAL EVALUATION ADULT - OTHER INFO
57year old Male pmh HTN, T2DM, CAD with STEMI (s/p PCI on brillinta), EtOH overuse who presented to the ED with 3 to 4 days of general malaise, abdominal pain and inability to tolerate PO found to be in alcohol withdrawal and DKA, per chart.  Patient reports currently consuming % of meals. Patient denies any difficulty chewing/swallowing, nausea, vomiting, diarrhea and constipation during visit, patient reports last bowel movement 11/16/2022. As per flow sheet, patient weighs: 61kg/134.4lbs (11/15/2022). Compared with the reported usual body weight, indicating weight loss of 16.6lbs/ 11.4% BW x 1month. Labs reviewed: elevated HgA1c- 10.6% (10/13/2022), elevated POCT- 194(11/6/2022), 251(11/15/2022), 108(11/14/2022), low phos-2.2 (11/16/2022), recommend to replete phos, per MD discretion. Patient is on multivitamin, thiamine, folic acid for micronutrient support.   RD provided extensive written and verbal nutrition education on heart healthy diabetes diet, including source of carbohydrates, avoid concentrated sweets and beverages, avoid salt in cooking, nutrition labels, portion size, increase intake of food high in fiber. Protein rich foods discussed. Patient is receptive to information provided.

## 2022-11-16 NOTE — PROGRESS NOTE ADULT - ASSESSMENT
58yo M pmh HTN, T2DM (on insulin, trulicity), CAD (s/p PCI on brillinta), EtOH overuse presented on 11/13 with 3 to 4 days of general malaise , ABD pain, and inability to tolerate p.o, admitted to MICU for DKA (11/13-1/16), now admitted to general medicine floors for further management.

## 2022-11-16 NOTE — PHYSICAL THERAPY INITIAL EVALUATION ADULT - MANUAL MUSCLE TESTING RESULTS, REHAB EVAL
Bilateral UE muscle strength grossly at least 3/5 Throughout; Bilateral LE muscle strength grossly at least 3/5 throughout.

## 2022-11-16 NOTE — DIETITIAN INITIAL EVALUATION ADULT - NS FNS DIET ORDER
Diet, Regular:   Consistent Carbohydrate {Evening Snack} (CSTCHOSN)  DASH/TLC {Sodium & Cholesterol Restricted} (DASH) (11-15-22 @ 09:28)

## 2022-11-16 NOTE — PROGRESS NOTE ADULT - SUBJECTIVE AND OBJECTIVE BOX
PROGRESS NOTE:   Authored by Harriet Pimentel MD  Pager: [facility] [phone]; [facility] [phone]    Patient is a 57y old  Male who presents with a chief complaint of DKA/Etoh [facility] (15 Nov 2022 15:41)      SUBJECTIVE / OVERNIGHT EVENTS:    All other review of systems is negative unless indicated above.    MEDICATIONS  (STANDING):  aspirin  chewable 81 milliGRAM(s) Oral daily  atorvastatin 80 milliGRAM(s) Oral at bedtime  chlorhexidine 2% Cloths 1 Application(s) Topical daily  dextrose 5%. 1000 milliLiter(s) (50 mL/Hr) IV Continuous <Continuous>  dextrose 5%. 1000 milliLiter(s) (100 mL/Hr) IV Continuous <Continuous>  dextrose 50% Injectable 25 Gram(s) IV Push once  dextrose 50% Injectable 12.5 Gram(s) IV Push once  dextrose 50% Injectable 25 Gram(s) IV Push once  folic acid 1 milliGRAM(s) Oral daily  glucagon  Injectable 1 milliGRAM(s) IntraMuscular once  heparin   Injectable 5000 Unit(s) SubCutaneous every 8 hours  influenza   Vaccine 0.5 milliLiter(s) IntraMuscular once  insulin glargine Injectable (LANTUS) 20 Unit(s) SubCutaneous <User Schedule>  insulin lispro (ADMELOG) corrective regimen sliding scale   SubCutaneous three times a day before meals  insulin lispro (ADMELOG) corrective regimen sliding scale   SubCutaneous at bedtime  insulin lispro Injectable (ADMELOG) 6 Unit(s) SubCutaneous three times a day before meals  levETIRAcetam 500 milliGRAM(s) Oral two times a day  metoprolol succinate ER 50 milliGRAM(s) Oral daily  multivitamin 1 Tablet(s) Oral daily  pantoprazole    Tablet 40 milliGRAM(s) Oral before breakfast  sacubitril 24 mG/valsartan 26 mG 1 Tablet(s) Oral two times a day  senna 2 Tablet(s) Oral at bedtime  sucralfate 1 Gram(s) Oral two times a day  thiamine 100 milliGRAM(s) Oral daily  ticagrelor 90 milliGRAM(s) Oral every 12 hours    MEDICATIONS  (PRN):  aluminum hydroxide/magnesium hydroxide/simethicone Suspension 30 milliLiter(s) Oral every 6 hours PRN Dyspepsia  dextrose Oral Gel 15 Gram(s) Oral once PRN Blood Glucose LESS THAN 70 milliGRAM(s)/deciliter  polyethylene glycol 3350 17 Gram(s) Oral daily PRN Constipation      CAPILLARY BLOOD GLUCOSE      POCT Blood Glucose.: 251 mg/dL (15 Nov 2022 22:50)  POCT Blood Glucose.: 225 mg/dL (15 Nov 2022 21:39)  POCT Blood Glucose.: 213 mg/dL (15 Nov 2022 16:39)  POCT Blood Glucose.: 248 mg/dL (15 Nov 2022 11:20)    I&O's Summary    15 Nov 2022 07:01  -  16 Nov 2022 07:00  --------------------------------------------------------  IN: 1730 mL / OUT: 1670 mL / NET: 60 mL        PHYSICAL EXAM:  Vital Signs Last 24 Hrs  T(C): 36.7 (16 Nov 2022 05:45), Max: 37.1 (15 Nov 2022 16:00)  T(F): 98 (16 Nov 2022 05:45), Max: 98.8 (15 Nov 2022 20:00)  HR: 75 (16 Nov 2022 05:45) (70 - 95)  BP: 127/88 (16 Nov 2022 05:45) (104/56 - 146/87)  BP(mean): 91 (15 Nov 2022 20:00) (67 - 101)  RR: 17 (16 Nov 2022 05:45) (13 - 20)  SpO2: 100% (16 Nov 2022 05:45) (96% - 100%)    Parameters below as of 16 Nov 2022 05:45  Patient On (Oxygen Delivery Method): room air        GENERAL: No acute distress, well-developed  HEAD:  Atraumatic, Normocephalic  EYES: EOMI, PERRLA, conjunctiva and sclera clear  NECK: Supple, no lymphadenopathy, no JVD  CHEST/LUNG: CTAB; No wheezes, rales, or rhonchi  HEART: Regular rate and rhythm; No murmurs, rubs, or gallops  ABDOMEN: Soft, non-tender, non-distended; normal bowel sounds, no organomegaly  EXTREMITIES:  2+ peripheral pulses b/l, No clubbing, cyanosis, or edema  NEUROLOGY: A&O x 3, no focal deficits  SKIN: No rashes or lesions    LABS:                        11.0   3.93  )-----------( 71       ( 15 Nov 2022 12:00 )             32.4     11-15    133<L>  |  98  |  18  ----------------------------<  265<H>  3.7   |  25  |  0.72    Ca    7.8<L>      15 Nov 2022 03:18  Phos  2.1     11-15  Mg     1.60     11-15    TPro  4.6<L>  /  Alb  2.7<L>  /  TBili  0.3  /  DBili  <0.2  /  AST  36  /  ALT  55<H>  /  AlkPhos  40  11-15              Culture - Blood (collected 13 Nov 2022 13:30)  Source: .Blood Blood-Venous  Preliminary Report (14 Nov 2022 18:02):    No growth to date.    Culture - Blood (collected 13 Nov 2022 11:00)  Source: .Blood Blood-Peripheral  Preliminary Report (14 Nov 2022 18:02):    No growth to date.        RADIOLOGY & ADDITIONAL TESTS:  Results Reviewed:   Imaging Personally Reviewed:  Electrocardiogram Personally Reviewed:    COORDINATION OF CARE:  Care Discussed with Consultants/Other Providers [Y/N]:  Prior or Outpatient Records Reviewed [Y/N]:   PROGRESS NOTE:   Authored by Harriet Pimentel MD  Pager: [facility] [phone]; [facility] [phone]    Patient is a 57y old  Male who presents with a chief complaint of DKA/Etoh [facility] (15 Nov 2022 15:41)      SUBJECTIVE / OVERNIGHT EVENTS:  NAEON. This AM, states tolerating diet, denies CP, SOB, subjective f/c, n/v.   All other review of systems is negative unless indicated above.    MEDICATIONS  (STANDING):  aspirin  chewable 81 milliGRAM(s) Oral daily  atorvastatin 80 milliGRAM(s) Oral at bedtime  chlorhexidine 2% Cloths 1 Application(s) Topical daily  dextrose 5%. 1000 milliLiter(s) (50 mL/Hr) IV Continuous <Continuous>  dextrose 5%. 1000 milliLiter(s) (100 mL/Hr) IV Continuous <Continuous>  dextrose 50% Injectable 25 Gram(s) IV Push once  dextrose 50% Injectable 12.5 Gram(s) IV Push once  dextrose 50% Injectable 25 Gram(s) IV Push once  folic acid 1 milliGRAM(s) Oral daily  glucagon  Injectable 1 milliGRAM(s) IntraMuscular once  heparin   Injectable 5000 Unit(s) SubCutaneous every 8 hours  influenza   Vaccine 0.5 milliLiter(s) IntraMuscular once  insulin glargine Injectable (LANTUS) 20 Unit(s) SubCutaneous <User Schedule>  insulin lispro (ADMELOG) corrective regimen sliding scale   SubCutaneous three times a day before meals  insulin lispro (ADMELOG) corrective regimen sliding scale   SubCutaneous at bedtime  insulin lispro Injectable (ADMELOG) 6 Unit(s) SubCutaneous three times a day before meals  levETIRAcetam 500 milliGRAM(s) Oral two times a day  metoprolol succinate ER 50 milliGRAM(s) Oral daily  multivitamin 1 Tablet(s) Oral daily  pantoprazole    Tablet 40 milliGRAM(s) Oral before breakfast  sacubitril 24 mG/valsartan 26 mG 1 Tablet(s) Oral two times a day  senna 2 Tablet(s) Oral at bedtime  sucralfate 1 Gram(s) Oral two times a day  thiamine 100 milliGRAM(s) Oral daily  ticagrelor 90 milliGRAM(s) Oral every 12 hours    MEDICATIONS  (PRN):  aluminum hydroxide/magnesium hydroxide/simethicone Suspension 30 milliLiter(s) Oral every 6 hours PRN Dyspepsia  dextrose Oral Gel 15 Gram(s) Oral once PRN Blood Glucose LESS THAN 70 milliGRAM(s)/deciliter  polyethylene glycol 3350 17 Gram(s) Oral daily PRN Constipation      CAPILLARY BLOOD GLUCOSE      POCT Blood Glucose.: 251 mg/dL (15 Nov 2022 22:50)  POCT Blood Glucose.: 225 mg/dL (15 Nov 2022 21:39)  POCT Blood Glucose.: 213 mg/dL (15 Nov 2022 16:39)  POCT Blood Glucose.: 248 mg/dL (15 Nov 2022 11:20)    I&O's Summary    15 Nov 2022 07:01  -  16 Nov 2022 07:00  --------------------------------------------------------  IN: 1730 mL / OUT: 1670 mL / NET: 60 mL        PHYSICAL EXAM:  Vital Signs Last 24 Hrs  T(C): 36.7 (16 Nov 2022 05:45), Max: 37.1 (15 Nov 2022 16:00)  T(F): 98 (16 Nov 2022 05:45), Max: 98.8 (15 Nov 2022 20:00)  HR: 75 (16 Nov 2022 05:45) (70 - 95)  BP: 127/88 (16 Nov 2022 05:45) (104/56 - 146/87)  BP(mean): 91 (15 Nov 2022 20:00) (67 - 101)  RR: 17 (16 Nov 2022 05:45) (13 - 20)  SpO2: 100% (16 Nov 2022 05:45) (96% - 100%)    Parameters below as of 16 Nov 2022 05:45  Patient On (Oxygen Delivery Method): room air        GENERAL: No acute distress, well-developed  HEAD:  Atraumatic, Normocephalic  EYES: EOMI, PERRLA, conjunctiva and sclera clear  NECK: Supple, no lymphadenopathy, no JVD  CHEST/LUNG: CTAB; No wheezes, rales, or rhonchi  HEART: Regular rate and rhythm; No murmurs, rubs, or gallops  ABDOMEN: Soft, non-tender, non-distended; normal bowel sounds, no organomegaly  EXTREMITIES:  2+ peripheral pulses b/l, No clubbing, cyanosis, or edema. Right hand laceration, sutured  NEUROLOGY: A&O x 3, no focal deficits  SKIN: No rashes or lesions    LABS:                        11.0   3.93  )-----------( 71       ( 15 Nov 2022 12:00 )             32.4     11-15    133<L>  |  98  |  18  ----------------------------<  265<H>  3.7   |  25  |  0.72    Ca    7.8<L>      15 Nov 2022 03:18  Phos  2.1     11-15  Mg     1.60     11-15    TPro  4.6<L>  /  Alb  2.7<L>  /  TBili  0.3  /  DBili  <0.2  /  AST  36  /  ALT  55<H>  /  AlkPhos  40  11-15              Culture - Blood (collected 13 Nov 2022 13:30)  Source: .Blood Blood-Venous  Preliminary Report (14 Nov 2022 18:02):    No growth to date.    Culture - Blood (collected 13 Nov 2022 11:00)  Source: .Blood Blood-Peripheral  Preliminary Report (14 Nov 2022 18:02):    No growth to date.        RADIOLOGY & ADDITIONAL TESTS:  Results Reviewed:   Imaging Personally Reviewed:  Electrocardiogram Personally Reviewed:    COORDINATION OF CARE:  Care Discussed with Consultants/Other Providers [Y/N]:  Prior or Outpatient Records Reviewed [Y/N]:

## 2022-11-16 NOTE — PROGRESS NOTE ADULT - PROBLEM SELECTOR PLAN 7
DVT ppx: heparin SQ  Diet: CC, DASH DVT ppx: patient ambulatory, no pharmacologic DVT ppx for now.   Diet: CC, DASH

## 2022-11-16 NOTE — PHYSICAL THERAPY INITIAL EVALUATION ADULT - PERTINENT HX OF CURRENT PROBLEM, REHAB EVAL
Pt is a 57 year old male who presented to [facility] with general malaise, abdominal pain, and inability to tolerate p.o. Admitted to MICU for DKA and concern for etoh withdrawal

## 2022-11-16 NOTE — PROGRESS NOTE ADULT - PROBLEM SELECTOR PLAN 1
A1c 10.6. Found to be in DKA here, s/p insulin gtt. pH 7.00/13/171. GAP > 37, Bicarb < 7; BHB: > 9 on presentation.   - Of note, documentation shows patient was discharged with Afrezza for DM, but patient states he does not take it. He only takes Basaglar and Trulicity at home   -- as per endo recs, continue Lantus 20 U daily and increased Humalog to 6 U qac   - c/w ISS  - Appreciate endo recs. A1c 10.6. Found to be in DKA here, s/p insulin gtt. pH 7.00/13/171. GAP > 37, Bicarb < 7; BHB: > 9 on presentation.   - Of note, documentation shows patient was discharged with Afrezza for DM, but patient states he does not take it. He only takes Basaglar and Trulicity at home. He also was ordered for Synjardy  -- as per endo recs, continue Lantus 20 U daily and Humalog to 8 U qac   - c/w ISS  - Appreciate endo recs.

## 2022-11-16 NOTE — CONSULT NOTE ADULT - ASSESSMENT
56yo M pmh HTN, T2DM (on insulin, trulicity), CAD (s/p PCI on brillinta), EtOH overuse p/w - Presenting to the ED with 3 to 4 days of general malaise , ABD pain, and inability to tolerate PO. Admitted for DKA, lipase elevation thought to be etoh induced pancreatitis.     #Pancytopenia   - unclear etiology however developed during admission and likely multifactorial with etoh use, acute illness including DKA with possible pancreatitis  - 11/16/22: WBA 2.52 ANC 1.05 PLT 53  - had heparin exposure although dropped one day after which is not consistent with KRISTIN.   - etOH can cause bone marrow suppression which is likely a component although has not had a etoh beverage in multiple days and on admission counts were normal   - hemolysis labs unremarkable, coags normal   - B12/folate normal   - will review peripheral blood smear   - CT A/P without evidence of splenomegaly. Fatty liver   - send ADITYA, TSH, EBV, Parvo, CMV, HIV, acute hepatitis panel   - send iron studies: total iron, TIBC, %sat, ferritin   - send PF4ab although doubt this is HIT   - no obvious medications that patient was on to cause cytopenias   - will review peripheral blood smear  - peripheral flow cytometry however doubt primary bone marrow process as cytopenias developed during admission   - CBC with diff daily   - transfuse for plt <10K, <20K if febrile, <50K if procedure or bleeding   - transfuse for Hb <7     David Nieves MD, PGY6  Hematology/Oncology Fellow   pager [phone]  After 5pm and on weekends page on call fellow  58yo M pmh HTN, T2DM (on insulin, trulicity), CAD (s/p PCI on brillinta), EtOH overuse p/w - Presenting to the ED with 3 to 4 days of general malaise , ABD pain, and inability to tolerate PO. Admitted for DKA, lipase elevation thought to be etoh induced pancreatitis.     #Pancytopenia   - unclear etiology however developed during admission and likely multifactorial with etoh use, acute illness including DKA with possible pancreatitis  - 11/16/22: WBA 2.52 ANC 1.05 PLT 53  - had heparin exposure although dropped one day after which is not consistent with KRISTIN.   - etOH can cause bone marrow suppression which is likely a component although has not had a etoh beverage in multiple days and on admission counts were normal   - hemolysis labs unremarkable, coags normal   - B12/folate normal   - will review peripheral blood smear   - CT A/P without evidence of splenomegaly. Fatty liver   - send ADITYA, TSH, EBV, Parvo, CMV, HIV, acute hepatitis panel   - send iron studies: total iron, TIBC, %sat, ferritin   - send PF4ab although doubt this is HIT   - no obvious medications that patient was on to cause cytopenias   - will review peripheral blood smear  - Peripheral flow cytometry sent 11/16/22. However, doubt primary bone marrow process as cytopenias developed during admission   - CBC with diff daily   - transfuse for plt <10K, <20K if febrile, <50K if procedure or bleeding   - transfuse for Hb <7     David Nieves MD, PGY6  Hematology/Oncology Fellow   pager [phone]  After 5pm and on weekends page on call fellow  56yo M pmh HTN, T2DM (on insulin, trulicity), CAD (s/p PCI on brillinta), EtOH overuse p/w - Presenting to the ED with 3 to 4 days of general malaise , ABD pain, and inability to tolerate PO. Admitted for DKA, lipase elevation thought to be etoh induced pancreatitis.     #Pancytopenia   - unclear etiology however developed during admission and likely multifactorial with etoh use, acute illness including DKA with possible pancreatitis. Also on admission likely very hemoconcentrated and after IVF element of hemodilution   - 11/16/22: WBA 2.52 ANC 1.05 PLT 53  - had heparin exposure although dropped one day after which is not consistent with KRISTIN.   - etOH can cause bone marrow suppression which is likely a component although has not had a etoh beverage in multiple days and on admission counts were normal   - hemolysis labs unremarkable, coags normal   - B12/folate normal   - will review peripheral blood smear   - CT A/P without evidence of splenomegaly. Fatty liver   - send ADITYA, TSH, EBV, Parvo, CMV, HIV, acute hepatitis panel   - send iron studies: total iron, TIBC, %sat, ferritin   - send PF4ab although doubt this is HIT   - no obvious medications that patient was on to cause cytopenias   - will review peripheral blood smear  - Peripheral flow cytometry sent 11/16/22. However, doubt primary bone marrow process as cytopenias developed during admission   - CBC with diff daily   - transfuse for plt <10K, <20K if febrile, <50K if procedure or bleeding   - transfuse for Hb <7     David Nieves MD, PGY6  Hematology/Oncology Fellow   pager [phone]  After 5pm and on weekends page on call fellow

## 2022-11-16 NOTE — DIETITIAN INITIAL EVALUATION ADULT - PERTINENT MEDS FT
MEDICATIONS  (STANDING):  aspirin  chewable 81 milliGRAM(s) Oral daily  atorvastatin 80 milliGRAM(s) Oral at bedtime  chlorhexidine 2% Cloths 1 Application(s) Topical daily  dextrose 5%. 1000 milliLiter(s) (50 mL/Hr) IV Continuous <Continuous>  dextrose 5%. 1000 milliLiter(s) (100 mL/Hr) IV Continuous <Continuous>  dextrose 50% Injectable 25 Gram(s) IV Push once  dextrose 50% Injectable 12.5 Gram(s) IV Push once  dextrose 50% Injectable 25 Gram(s) IV Push once  folic acid 1 milliGRAM(s) Oral daily  glucagon  Injectable 1 milliGRAM(s) IntraMuscular once  influenza   Vaccine 0.5 milliLiter(s) IntraMuscular once  insulin glargine Injectable (LANTUS) 20 Unit(s) SubCutaneous <User Schedule>  insulin lispro (ADMELOG) corrective regimen sliding scale   SubCutaneous three times a day before meals  insulin lispro (ADMELOG) corrective regimen sliding scale   SubCutaneous at bedtime  insulin lispro Injectable (ADMELOG) 8 Unit(s) SubCutaneous three times a day before meals  levETIRAcetam 500 milliGRAM(s) Oral two times a day  metoprolol succinate ER 50 milliGRAM(s) Oral daily  multivitamin 1 Tablet(s) Oral daily  pantoprazole    Tablet 40 milliGRAM(s) Oral before breakfast  sacubitril 49 mG/valsartan 51 mG 1 Tablet(s) Oral two times a day  senna 2 Tablet(s) Oral at bedtime  sucralfate 1 Gram(s) Oral two times a day  thiamine 100 milliGRAM(s) Oral daily  ticagrelor 90 milliGRAM(s) Oral every 12 hours    MEDICATIONS  (PRN):  aluminum hydroxide/magnesium hydroxide/simethicone Suspension 30 milliLiter(s) Oral every 6 hours PRN Dyspepsia  dextrose Oral Gel 15 Gram(s) Oral once PRN Blood Glucose LESS THAN 70 milliGRAM(s)/deciliter  polyethylene glycol 3350 17 Gram(s) Oral daily PRN Constipation

## 2022-11-16 NOTE — PROGRESS NOTE ADULT - ASSESSMENT
58yo M pmh HTN, T2DM, CAD with STEMI (s/p PCI on brillinta), EtOH overuse who presented to the ED with 3 to 4 days of general malaise, abdominal pain and inability to tolerate PO found to be in alcohol withdrawal and DKA. Endocrinology consulted for management of diabetes.    1. Poorly controlled T2DM with hyperglycemia  Complicated by DKA and pancreatitis  On admission, labs were , AG >37, bicarb <7, pH 7, BHB >9 consistent with DKA, now resolved  Lipase also elevated to 378, TG 94  HbA1c: 10.6  Home Regimen: Trulicity 0.75mg subcutaneous weekly which was started around one month ago when insulin (Basaglar 20 units and Afreeza 12 units bid) was discontinued   Endocrinologist: does not have  Trulicity can cause pancreatitis as well as the patients alcohol use, TG 94 should not be the cause of pancreatitis    While inpatient:   BG target 140-180 mg/dl while in ICU  Remains above target  Continue Lantus 20 units every morning (0800 daily) - first dose today, will see full effect tomorrow  Increase Admelog to 8 units TID before meals (Hold if NPO/skips meal)  Continue Admelog LOW dose scale before meals, low dose at bedtime  Consistent carbohydrate diet  Check BG before meals and bedtime  Hypoglycemia protocol   FS before meals and bedtime  RD consult    Discharge Plan:   Would STOP Trulicity given pancreatitis and alcohol use. Should be discharged on basal/bolus (not inhaled) insulin, final recommendations pending clinical course  Patient to call doctor with persistent high or low BG at home.   Ensure patient has glucometer, test strips and lancets on discharge.  Recommend routine outpatient ophthalmology, podiatry and endocrinology f/u  Will schedule a follow up appointment outpatient at the address below:  [address] [place] [address] [address], [place], [place], [address]  Phone Number: [phone]    2. Pancreatitis  Followup CT abdomen/pelvis for pancreatitis work up, supportive care per primary team for pancreatitis  Last TG 94    3. HLD  LDL target less than 70  Last LDL 80 in 10/2022  Continue Atorvastatin 80 mg daily  Followup fasting lipid panel annually    Estefania Adrian  Nurse Practitioner  Division of Endocrinology & Diabetes  In house pager #[phone]/long range pager #[phone]    If before 9AM or after 6PM, or on weekends/holidays, please call endocrine answering service for assistance ([phone]).  For nonurgent matters email [email or web address] for assistance.

## 2022-11-16 NOTE — PROGRESS NOTE ADULT - PROBLEM SELECTOR PLAN 3
Hx of CAD, s/p recent NSTEMI. Hx of PCI on ASA and Brilinta at home.   - c/w home ASA, Brilinta, entresto  - c/w hoem Toprol Hx of inferolateral STEMI s/p BECKY to pCX 100% occlusion, pLAD 30%, dOM 100% EF 30-35%, LVEDP 36 (3/1/22), IABP placed, in 2/2022  - TTE 10/13: with EF 49%, and mild LV systolic dysfunction  - c/w Asa 81mg QD, sacubitril-valsartan 24mg-26mg BID, metoprolol succinate 50mg QD, ticagrelor 90 mg Q12H.  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 Li, OP follow-up. NTD inpatient

## 2022-11-16 NOTE — PROGRESS NOTE ADULT - PROBLEM SELECTOR PLAN 2
Lipase uptrending.   -- TGL 94 , abdominal pain resolved,    --Lipase is trending up -- 188> 358  >436  - CT A/P-- no acute abdominal findings    - currently tolerating PO. Lipase uptrending.   -- TGL 94 , abdominal pain resolved,    --Lipase is trending up -- 188> 358  >436 -> 445  - CT A/P-- no acute abdominal findings    - currently tolerating PO. -- TGL 94 , abdominal pain resolved,    --Lipase is trending up -- 188> 358  >436 -> 445. Will stop trending.  - CT A/P-- no acute abdominal findings    - currently tolerating PO.

## 2022-11-17 ENCOUNTER — TRANSCRIPTION ENCOUNTER (OUTPATIENT)
Age: 57
End: 2022-11-17

## 2022-11-17 LAB
ALBUMIN SERPL ELPH-MCNC: 3.1 G/DL — LOW (ref 3.3–5)
ALP SERPL-CCNC: 48 U/L — SIGNIFICANT CHANGE UP (ref 40–120)
ALT FLD-CCNC: 69 U/L — HIGH (ref 4–41)
ANION GAP SERPL CALC-SCNC: 10 MMOL/L — SIGNIFICANT CHANGE UP (ref 7–14)
AST SERPL-CCNC: 48 U/L — HIGH (ref 4–40)
BASOPHILS # BLD AUTO: 0.01 K/UL — SIGNIFICANT CHANGE UP (ref 0–0.2)
BASOPHILS NFR BLD AUTO: 0.3 % — SIGNIFICANT CHANGE UP (ref 0–2)
BILIRUB SERPL-MCNC: 0.3 MG/DL — SIGNIFICANT CHANGE UP (ref 0.2–1.2)
BUN SERPL-MCNC: 17 MG/DL — SIGNIFICANT CHANGE UP (ref 7–23)
CALCIUM SERPL-MCNC: 8.4 MG/DL — SIGNIFICANT CHANGE UP (ref 8.4–10.5)
CHLORIDE SERPL-SCNC: 96 MMOL/L — LOW (ref 98–107)
CMV DNA CSF QL NAA+PROBE: SIGNIFICANT CHANGE UP
CMV DNA SPEC NAA+PROBE-LOG#: SIGNIFICANT CHANGE UP LOG10IU/ML
CO2 SERPL-SCNC: 26 MMOL/L — SIGNIFICANT CHANGE UP (ref 22–31)
CREAT SERPL-MCNC: 0.83 MG/DL — SIGNIFICANT CHANGE UP (ref 0.5–1.3)
EGFR: 103 ML/MIN/1.73M2 — SIGNIFICANT CHANGE UP
EOSINOPHIL # BLD AUTO: 0.1 K/UL — SIGNIFICANT CHANGE UP (ref 0–0.5)
EOSINOPHIL NFR BLD AUTO: 3.2 % — SIGNIFICANT CHANGE UP (ref 0–6)
FERRITIN SERPL-MCNC: 458 NG/ML — HIGH (ref 30–400)
GLUCOSE BLDC GLUCOMTR-MCNC: 214 MG/DL — HIGH (ref 70–99)
GLUCOSE BLDC GLUCOMTR-MCNC: 266 MG/DL — HIGH (ref 70–99)
GLUCOSE BLDC GLUCOMTR-MCNC: 289 MG/DL — HIGH (ref 70–99)
GLUCOSE BLDC GLUCOMTR-MCNC: 313 MG/DL — HIGH (ref 70–99)
GLUCOSE SERPL-MCNC: 198 MG/DL — HIGH (ref 70–99)
HAV IGM SER-ACNC: SIGNIFICANT CHANGE UP
HBV CORE IGM SER-ACNC: SIGNIFICANT CHANGE UP
HBV SURFACE AG SER-ACNC: SIGNIFICANT CHANGE UP
HCT VFR BLD CALC: 32.1 % — LOW (ref 39–50)
HCV AB S/CO SERPL IA: 0.08 S/CO — SIGNIFICANT CHANGE UP (ref 0–0.99)
HCV AB SERPL-IMP: SIGNIFICANT CHANGE UP
HGB BLD-MCNC: 11.2 G/DL — LOW (ref 13–17)
IANC: 1.39 K/UL — LOW (ref 1.8–7.4)
IMM GRANULOCYTES NFR BLD AUTO: 0.6 % — SIGNIFICANT CHANGE UP (ref 0–0.9)
IRON SATN MFR SERPL: 36 % — SIGNIFICANT CHANGE UP (ref 14–50)
IRON SATN MFR SERPL: 68 UG/DL — SIGNIFICANT CHANGE UP (ref 45–165)
LYMPHOCYTES # BLD AUTO: 1.33 K/UL — SIGNIFICANT CHANGE UP (ref 1–3.3)
LYMPHOCYTES # BLD AUTO: 42.4 % — SIGNIFICANT CHANGE UP (ref 13–44)
MAGNESIUM SERPL-MCNC: 1.8 MG/DL — SIGNIFICANT CHANGE UP (ref 1.6–2.6)
MCHC RBC-ENTMCNC: 30.4 PG — SIGNIFICANT CHANGE UP (ref 27–34)
MCHC RBC-ENTMCNC: 34.9 GM/DL — SIGNIFICANT CHANGE UP (ref 32–36)
MCV RBC AUTO: 87.2 FL — SIGNIFICANT CHANGE UP (ref 80–100)
MONOCYTES # BLD AUTO: 0.29 K/UL — SIGNIFICANT CHANGE UP (ref 0–0.9)
MONOCYTES NFR BLD AUTO: 9.2 % — SIGNIFICANT CHANGE UP (ref 2–14)
NEUTROPHILS # BLD AUTO: 1.39 K/UL — LOW (ref 1.8–7.4)
NEUTROPHILS NFR BLD AUTO: 44.3 % — SIGNIFICANT CHANGE UP (ref 43–77)
NRBC # BLD: 0 /100 WBCS — SIGNIFICANT CHANGE UP (ref 0–0)
NRBC # FLD: 0 K/UL — SIGNIFICANT CHANGE UP (ref 0–0)
PHOSPHATE SERPL-MCNC: 2 MG/DL — LOW (ref 2.5–4.5)
PLATELET # BLD AUTO: 82 K/UL — LOW (ref 150–400)
POTASSIUM SERPL-MCNC: 4.6 MMOL/L — SIGNIFICANT CHANGE UP (ref 3.5–5.3)
POTASSIUM SERPL-SCNC: 4.6 MMOL/L — SIGNIFICANT CHANGE UP (ref 3.5–5.3)
PROT SERPL-MCNC: 5.5 G/DL — LOW (ref 6–8.3)
RBC # BLD: 3.68 M/UL — LOW (ref 4.2–5.8)
RBC # FLD: 11.8 % — SIGNIFICANT CHANGE UP (ref 10.3–14.5)
SODIUM SERPL-SCNC: 132 MMOL/L — LOW (ref 135–145)
TIBC SERPL-MCNC: 191 UG/DL — LOW (ref 220–430)
UIBC SERPL-MCNC: 123 UG/DL — SIGNIFICANT CHANGE UP (ref 110–370)
WBC # BLD: 3.14 K/UL — LOW (ref 3.8–10.5)
WBC # FLD AUTO: 3.14 K/UL — LOW (ref 3.8–10.5)

## 2022-11-17 PROCEDURE — 99233 SBSQ HOSP IP/OBS HIGH 50: CPT | Mod: GC

## 2022-11-17 RX ORDER — INSULIN GLARGINE 100 [IU]/ML
24 INJECTION, SOLUTION SUBCUTANEOUS
Refills: 0 | Status: DISCONTINUED | OUTPATIENT
Start: 2022-11-18 | End: 2022-11-18

## 2022-11-17 RX ORDER — INSULIN LISPRO 100/ML
10 VIAL (ML) SUBCUTANEOUS
Refills: 0 | Status: DISCONTINUED | OUTPATIENT
Start: 2022-11-17 | End: 2022-11-18

## 2022-11-17 RX ORDER — SODIUM CHLORIDE 9 MG/ML
1000 INJECTION INTRAMUSCULAR; INTRAVENOUS; SUBCUTANEOUS
Refills: 0 | Status: DISCONTINUED | OUTPATIENT
Start: 2022-11-17 | End: 2022-11-18

## 2022-11-17 RX ADMIN — CHLORHEXIDINE GLUCONATE 1 APPLICATION(S): 213 SOLUTION TOPICAL at 12:48

## 2022-11-17 RX ADMIN — Medication 1 GRAM(S): at 17:31

## 2022-11-17 RX ADMIN — INSULIN GLARGINE 20 UNIT(S): 100 INJECTION, SOLUTION SUBCUTANEOUS at 08:59

## 2022-11-17 RX ADMIN — Medication 10 UNIT(S): at 12:46

## 2022-11-17 RX ADMIN — Medication 3: at 12:45

## 2022-11-17 RX ADMIN — Medication 2: at 08:59

## 2022-11-17 RX ADMIN — SACUBITRIL AND VALSARTAN 1 TABLET(S): 24; 26 TABLET, FILM COATED ORAL at 05:32

## 2022-11-17 RX ADMIN — PANTOPRAZOLE SODIUM 40 MILLIGRAM(S): 20 TABLET, DELAYED RELEASE ORAL at 07:18

## 2022-11-17 RX ADMIN — SENNA PLUS 2 TABLET(S): 8.6 TABLET ORAL at 22:28

## 2022-11-17 RX ADMIN — Medication 50 MILLIGRAM(S): at 05:33

## 2022-11-17 RX ADMIN — Medication 1 GRAM(S): at 05:32

## 2022-11-17 RX ADMIN — SACUBITRIL AND VALSARTAN 1 TABLET(S): 24; 26 TABLET, FILM COATED ORAL at 17:34

## 2022-11-17 RX ADMIN — Medication 1 TABLET(S): at 11:10

## 2022-11-17 RX ADMIN — Medication 4: at 17:37

## 2022-11-17 RX ADMIN — ATORVASTATIN CALCIUM 80 MILLIGRAM(S): 80 TABLET, FILM COATED ORAL at 22:27

## 2022-11-17 RX ADMIN — Medication 63.75 MILLIMOLE(S): at 09:58

## 2022-11-17 RX ADMIN — TICAGRELOR 90 MILLIGRAM(S): 90 TABLET ORAL at 05:34

## 2022-11-17 RX ADMIN — Medication 100 MILLIGRAM(S): at 11:10

## 2022-11-17 RX ADMIN — LEVETIRACETAM 500 MILLIGRAM(S): 250 TABLET, FILM COATED ORAL at 05:33

## 2022-11-17 RX ADMIN — Medication 1: at 22:26

## 2022-11-17 RX ADMIN — Medication 81 MILLIGRAM(S): at 11:13

## 2022-11-17 RX ADMIN — Medication 1 MILLIGRAM(S): at 11:10

## 2022-11-17 RX ADMIN — LEVETIRACETAM 500 MILLIGRAM(S): 250 TABLET, FILM COATED ORAL at 17:31

## 2022-11-17 RX ADMIN — Medication 8 UNIT(S): at 09:01

## 2022-11-17 RX ADMIN — TICAGRELOR 90 MILLIGRAM(S): 90 TABLET ORAL at 17:31

## 2022-11-17 RX ADMIN — Medication 10 UNIT(S): at 17:37

## 2022-11-17 NOTE — DISCHARGE NOTE PROVIDER - NSDCFUADDAPPT_GEN_ALL_CORE_FT
You have endocrine appointments scheduled for 2/22/23 with Dr. Yu and another with Valery CHAND on 12/6/22. Please be sure to attend these appointments

## 2022-11-17 NOTE — PROGRESS NOTE ADULT - PROBLEM SELECTOR PLAN 5
Platelets downtrending. Unclear etiology. Also with pancytopenia.  -possible 2/2 to ETOH use vs medication-induced.  -entresto mainly known to cause anemia.   - f/u peripheral smear  - EBV, parvo, CMV, ADITYA, TSH, acute hep panel pending  - iron studies pending  - transfuse paltelet <!0K, <20k if febrile and <50k if bleeding  - transfuse Hgb<7  - Trend for now  - appreciate heme recs

## 2022-11-17 NOTE — PROGRESS NOTE ADULT - PROBLEM SELECTOR PLAN 3
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 Li, OP follow-up. NTD inpatient

## 2022-11-17 NOTE — PROGRESS NOTE ADULT - SUBJECTIVE AND OBJECTIVE BOX
PROGRESS NOTE:   Authored by Harriet Pimentel MD  Pager: [facility] [phone]; [facility] [phone]    Patient is a 57y old  Male who presents with a chief complaint of DKA/Etoh [facility] (17 Nov 2022 06:26)      SUBJECTIVE / OVERNIGHT EVENTS:  NAEON. Denies CP, SOB, subjective f/c, n/v.   All other review of systems is negative unless indicated above.    MEDICATIONS  (STANDING):  aspirin  chewable 81 milliGRAM(s) Oral daily  atorvastatin 80 milliGRAM(s) Oral at bedtime  chlorhexidine 2% Cloths 1 Application(s) Topical daily  dextrose 5%. 1000 milliLiter(s) (50 mL/Hr) IV Continuous <Continuous>  dextrose 5%. 1000 milliLiter(s) (100 mL/Hr) IV Continuous <Continuous>  dextrose 50% Injectable 25 Gram(s) IV Push once  dextrose 50% Injectable 12.5 Gram(s) IV Push once  dextrose 50% Injectable 25 Gram(s) IV Push once  folic acid 1 milliGRAM(s) Oral daily  glucagon  Injectable 1 milliGRAM(s) IntraMuscular once  influenza   Vaccine 0.5 milliLiter(s) IntraMuscular once  insulin glargine Injectable (LANTUS) 20 Unit(s) SubCutaneous <User Schedule>  insulin lispro (ADMELOG) corrective regimen sliding scale   SubCutaneous three times a day before meals  insulin lispro (ADMELOG) corrective regimen sliding scale   SubCutaneous at bedtime  insulin lispro Injectable (ADMELOG) 8 Unit(s) SubCutaneous three times a day before meals  levETIRAcetam 500 milliGRAM(s) Oral two times a day  metoprolol succinate ER 50 milliGRAM(s) Oral daily  multivitamin 1 Tablet(s) Oral daily  pantoprazole    Tablet 40 milliGRAM(s) Oral before breakfast  sacubitril 49 mG/valsartan 51 mG 1 Tablet(s) Oral two times a day  senna 2 Tablet(s) Oral at bedtime  sucralfate 1 Gram(s) Oral two times a day  thiamine 100 milliGRAM(s) Oral daily  ticagrelor 90 milliGRAM(s) Oral every 12 hours    MEDICATIONS  (PRN):  aluminum hydroxide/magnesium hydroxide/simethicone Suspension 30 milliLiter(s) Oral every 6 hours PRN Dyspepsia  dextrose Oral Gel 15 Gram(s) Oral once PRN Blood Glucose LESS THAN 70 milliGRAM(s)/deciliter  polyethylene glycol 3350 17 Gram(s) Oral daily PRN Constipation      CAPILLARY BLOOD GLUCOSE      POCT Blood Glucose.: 270 mg/dL (16 Nov 2022 22:06)  POCT Blood Glucose.: 288 mg/dL (16 Nov 2022 17:45)  POCT Blood Glucose.: 312 mg/dL (16 Nov 2022 12:30)  POCT Blood Glucose.: 194 mg/dL (16 Nov 2022 08:50)    I&O's Summary    15 Nov 2022 07:01  -  16 Nov 2022 07:00  --------------------------------------------------------  IN: 1730 mL / OUT: 1670 mL / NET: 60 mL        PHYSICAL EXAM:  Vital Signs Last 24 Hrs  T(C): 37.2 (16 Nov 2022 21:20), Max: 37.2 (16 Nov 2022 21:20)  T(F): 98.9 (16 Nov 2022 21:20), Max: 98.9 (16 Nov 2022 21:20)  HR: 80 (16 Nov 2022 21:20) (76 - 83)  BP: 100/64 (16 Nov 2022 21:20) (100/64 - 112/73)  BP(mean): --  RR: 17 (16 Nov 2022 21:20) (17 - 18)  SpO2: 100% (16 Nov 2022 21:20) (100% - 100%)    Parameters below as of 16 Nov 2022 21:20  Patient On (Oxygen Delivery Method): room air      GENERAL: No acute distress, well-developed  HEAD:  Atraumatic, Normocephalic  EYES: EOMI, PERRLA, conjunctiva and sclera clear  NECK: Supple, no lymphadenopathy, no JVD  CHEST/LUNG: CTAB; No wheezes, rales, or rhonchi  HEART: Regular rate and rhythm; No murmurs, rubs, or gallops  ABDOMEN: Soft, non-tender, non-distended; normal bowel sounds, no organomegaly  EXTREMITIES:  2+ peripheral pulses b/l, No clubbing, cyanosis, or edema. Right hand laceration, sutured  NEUROLOGY: A&O x 3, no focal deficits  SKIN: No rashes or lesions    LABS:                        10.8   2.52  )-----------( 53       ( 16 Nov 2022 06:15 )             30.7     11-16    132<L>  |  96<L>  |  18  ----------------------------<  203<H>  4.3   |  28  |  0.71    Ca    8.2<L>      16 Nov 2022 06:15  Phos  2.2     11-16  Mg     2.10     11-16    TPro  x   /  Alb  x   /  TBili  0.3  /  DBili  x   /  AST  x   /  ALT  x   /  AlkPhos  x   11-16                RADIOLOGY & ADDITIONAL TESTS:  Results Reviewed:   Imaging Personally Reviewed:  Electrocardiogram Personally Reviewed:    COORDINATION OF CARE:  Care Discussed with Consultants/Other Providers [Y/N]:  Prior or Outpatient Records Reviewed [Y/N]:

## 2022-11-17 NOTE — PROGRESS NOTE ADULT - PROBLEM SELECTOR PLAN 1
A1c 10.6. Found to be in DKA here, s/p insulin gtt. pH 7.00/13/171. GAP > 37, Bicarb < 7; BHB: > 9 on presentation.   - Of note, documentation shows patient was discharged with Afrezza for DM, but patient states he does not take it. He only takes Basaglar and Trulicity at home. He also was ordered for Synjardy  -- as per endo recs, continue Lantus 20 U daily and Humalog to 8 U qac   - will need diabetic teaching  - c/w ISS  - Appreciate endo recs.

## 2022-11-17 NOTE — PROGRESS NOTE ADULT - PROBLEM SELECTOR PLAN 4
Hx of inferolateral STEMI s/p BECKY to pCX 100% occlusion, pLAD 30%, dOM 100% EF 30-35%, LVEDP 36 (3/1/22), IABP placed, in 2/2022  - TTE 10/13: with EF 49%, and mild LV systolic dysfunction  - c/w Asa 81mg QD, sacubitril-valsartan BID, metoprolol succinate 50mg QD, ticagrelor 90 mg Q12H.  - c/w home ASA, Brilinta entresto  - c/w home Toprol

## 2022-11-17 NOTE — PROGRESS NOTE ADULT - PROBLEM SELECTOR PLAN 2
-- TGL 94 , abdominal pain resolved,    --Lipase is trending up -- 188> 358  >436 -> 445. Will stop trending.  - CT A/P-- no acute abdominal findings    - currently tolerating PO.

## 2022-11-17 NOTE — CHART NOTE - NSCHARTNOTEFT_GEN_A_CORE
56yo M pmh HTN, T2DM, CAD with STEMI (s/p PCI on brillinta), EtOH overuse who presented to the ED with 3 to 4 days of general malaise, abdominal pain and inability to tolerate PO found to be in alcohol withdrawal and DKA. Endocrinology consulted for management of diabetes.    Contacted by primary team for discharge recommendations for today  Patient with poorly controlled T2DM with hyperglycemia, HbA1c: 10.6  Complicated by DKA and pancreatitis  On admission, labs were , AG >37, bicarb <7, pH 7, BHB >9 consistent with DKA, now resolved  Lipase also elevated to 378, TG 94    Home Regimen: Trulicity 0.75mg subcutaneous weekly which was started around one month ago when insulin (Basaglar 20 units and Afreeza 12 units bid) was discontinued   Endocrinologist: does not have  Trulicity can cause pancreatitis as well as the patients alcohol use, TG 94 should not be the cause of pancreatitis    While inpatient:   BG target 140-180 mg/dl while in ICU  Remains above target  Increase Lantus to 24 units every morning (0800 daily)   Increase Admelog to 10 units TID before meals (Hold if NPO/skips meal)  Continue Admelog LOW dose scale before meals, low dose at bedtime  Consistent carbohydrate diet  Check BG before meals and bedtime  Hypoglycemia protocol   FS before meals and bedtime  RD consult    Discharge Plan:   Would STOP Trulicity given pancreatitis and alcohol use. Should be discharged on basal/bolus (not inhaled) insulin. Current dosing if discharged today with no correctional scales  Patient to call doctor with persistent high or low BG at home.   Ensure patient has glucometer, test strips and lancets on discharge.  Recommend routine outpatient ophthalmology, podiatry and endocrinology f/u  Follow up with endocrine outpatient at the address below:  [address] [place] [address] [address] [facility], [place], [address], Phone Number: [phone]  1. With nurse practitioner Valery Stevensonr: 12/6/22 at 11:15 AM ([address])  2. With endocrinologist Dr. Yu: 2/22/23 at 9:00 AM ([facility])     CAPILLARY BLOOD GLUCOSE    POCT Blood Glucose.: 214 mg/dL (17 Nov 2022 08:52)  POCT Blood Glucose.: 270 mg/dL (16 Nov 2022 22:06)  POCT Blood Glucose.: 288 mg/dL (16 Nov 2022 17:45)  POCT Blood Glucose.: 312 mg/dL (16 Nov 2022 12:30)      11-17    132<L>  |  96<L>  |  17  ----------------------------<  198<H>  4.6   |  26  |  0.83    Ca    8.4      17 Nov 2022 07:15  Phos  2.0     11-17  Mg     1.80     11-17    TPro  5.5<L>  /  Alb  3.1<L>  /  TBili  0.3  /  DBili  x   /  AST  48<H>  /  ALT  69<H>  /  AlkPhos  48  11-17      MEDICATIONS  (STANDING):  aspirin  chewable 81 milliGRAM(s) Oral daily  atorvastatin 80 milliGRAM(s) Oral at bedtime  chlorhexidine 2% Cloths 1 Application(s) Topical daily  dextrose 5%. 1000 milliLiter(s) (50 mL/Hr) IV Continuous <Continuous>  dextrose 5%. 1000 milliLiter(s) (100 mL/Hr) IV Continuous <Continuous>  dextrose 50% Injectable 25 Gram(s) IV Push once  dextrose 50% Injectable 12.5 Gram(s) IV Push once  dextrose 50% Injectable 25 Gram(s) IV Push once  folic acid 1 milliGRAM(s) Oral daily  glucagon  Injectable 1 milliGRAM(s) IntraMuscular once  influenza   Vaccine 0.5 milliLiter(s) IntraMuscular once  insulin glargine Injectable (LANTUS) 24 Unit(s) SubCutaneous <User Schedule>  insulin lispro (ADMELOG) corrective regimen sliding scale   SubCutaneous three times a day before meals  insulin lispro (ADMELOG) corrective regimen sliding scale   SubCutaneous at bedtime  insulin lispro Injectable (ADMELOG) 10 Unit(s) SubCutaneous three times a day before meals  levETIRAcetam 500 milliGRAM(s) Oral two times a day  metoprolol succinate ER 50 milliGRAM(s) Oral daily  multivitamin 1 Tablet(s) Oral daily  pantoprazole    Tablet 40 milliGRAM(s) Oral before breakfast  sacubitril 49 mG/valsartan 51 mG 1 Tablet(s) Oral two times a day  senna 2 Tablet(s) Oral at bedtime  sodium chloride 0.9%. 1000 milliLiter(s) (75 mL/Hr) IV Continuous <Continuous>  sucralfate 1 Gram(s) Oral two times a day  thiamine 100 milliGRAM(s) Oral daily  ticagrelor 90 milliGRAM(s) Oral every 12 hours      Diet, Regular:   Consistent Carbohydrate {Evening Snack} (CSTCHOSN)  DASH/TLC {Sodium & Cholesterol Restricted} (DASH) (11-15-22 @ 09:28)      A1C with Estimated Average Glucose Result: 10.6 % (10-13-22 @ 08:18)  A1C with Estimated Average Glucose Result: 10.5 % (02-25-22 @ 13:27)      Estefania Adrian  Nurse Practitioner  Division of Endocrinology & Diabetes  In house pager #[phone]/long range pager #[phone]    If before 9AM or after 6PM, or on weekends/holidays, please call endocrine answering service for assistance ([phone]).  For nonurgent matters email [email or web address] for assistance.

## 2022-11-17 NOTE — DISCHARGE NOTE PROVIDER - PROVIDER TOKENS
PROVIDER:[TOKEN:[[id number]],SCHEDULEDAPPT:[02/22/2023],SCHEDULEDAPPTTIME:[09:00 AM]],PROVIDER:[TOKEN:[[id number]],SCHEDULEDAPPT:[12/06/2022],SCHEDULEDAPPTTIME:[11:15 AM]] PROVIDER:[TOKEN:[[id number]],SCHEDULEDAPPT:[02/22/2023],SCHEDULEDAPPTTIME:[09:00 AM]],PROVIDER:[TOKEN:[[id number]],SCHEDULEDAPPT:[12/06/2022],SCHEDULEDAPPTTIME:[11:15 AM]],PROVIDER:[TOKEN:[[id number]]]

## 2022-11-17 NOTE — PROGRESS NOTE ADULT - PROBLEM SELECTOR PLAN 6
pt states that he does not drink continuously, but from time to time drinks  a pint of heavy liquor per week, last drink was on Tu,11/08  - patient has history of ETOH associated seizure, on keppra at home. continue for now.   - monitor off CIWA for now.

## 2022-11-17 NOTE — DISCHARGE NOTE PROVIDER - NSDCCPCAREPLAN_GEN_ALL_CORE_FT
PRINCIPAL DISCHARGE DIAGNOSIS  Diagnosis: Type 2 diabetes mellitus  Assessment and Plan of Treatment: You were found to have elevated glucose levels on admission. This caused diabetic ketoacidosis and you required a stay in the intensive care unit on an insulin drip. You were seen by the endocrinology team who recommended that you be discharged on long-acting adn short-acting insulin. These medications have been sent to your pharmacy on file. You were also given diabetic teaching. Endocrine appointments have been scheduled for you on 12/6/22 and 2/22/23. The times and locations of these appointments are included in this paperwork.      SECONDARY DISCHARGE DIAGNOSES  Diagnosis: Thrombocytopenia  Assessment and Plan of Treatment: You were found to have low platelets on admission. The hematology team saw you and labwork was done to find a cause for the low platelets. It is possible that your platelets were low due to viral infection. Prior to discharge your platelets were uptrending and the hematology team cleared you for discharge. Please follow-up with your [facility] care provider within 1 week of discharge to get blood counts to monitor his platelets. We have also included the number for the hematology clinic for you to make an appointment with hematology team after discharge.    Diagnosis: Mac Barr virus infection  Assessment and Plan of Treatment: You were found to have antibodies for the mac barr virus. This can cause mononucleosis which can give feelings of fatigue. Please continue with good oral hydration and follow-up with your primary care provider within one week of discharge.     PRINCIPAL DISCHARGE DIAGNOSIS  Diagnosis: Type 2 diabetes mellitus  Assessment and Plan of Treatment: You were found to have elevated glucose levels on admission. This caused diabetic ketoacidosis and you required a stay in the intensive care unit on an insulin drip. You were seen by the endocrinology team who recommended that you be discharged on long-acting and short-acting insulin. Please take Basaglar (long-acting) inuslin 24units every morning at 8am and take Admelog (short-acting) insulin 10units three times a day before meals. A glucometer, test strips, lancets, and alcohol swabs have also been sent to your pharmacy. Please  call the endocrine office or your priamry care provider if your fingersticks remain persistently above 250.  These medications have been sent to your pharmacy on file. You were also given diabetic teaching. Endocrine appointments have been scheduled for you on 12/6/22 and 2/22/23. The times and locations of these appointments are included in this paperwork.      SECONDARY DISCHARGE DIAGNOSES  Diagnosis: Thrombocytopenia  Assessment and Plan of Treatment: You were found to have low platelets on admission. The hematology team saw you and labwork was done to find a cause for the low platelets. It is possible that your platelets were low due to viral infection. Prior to discharge your platelets were uptrending and the hematology team cleared you for discharge. [facility] week of discharge to get blood counts to monitor his platelets. We have also included the number for the hematology clinic for you to make an appointment with hematology team after discharge.    Diagnosis: Mac Barr virus infection  Assessment and Plan of Treatment: You were found to have antibodies for the mac barr virus. This can cause mononucleosis which can give feelings of fatigue. Please continue with good oral hydration and follow-up with your primary care provider within one week of discharge.

## 2022-11-17 NOTE — DISCHARGE NOTE PROVIDER - DETAILS OF MALNUTRITION DIAGNOSIS/DIAGNOSES
This patient has been assessed with a concern for Malnutrition and was treated during this hospitalization for the following Nutrition diagnosis/diagnoses:     -  11/16/2022: Severe protein-calorie malnutrition

## 2022-11-17 NOTE — DISCHARGE NOTE PROVIDER - NSDCFUSCHEDAPPT_GEN_ALL_CORE_FT
[facility]  [facility] [address] [place] [address]  Scheduled Appointment: 12/06/2022

## 2022-11-17 NOTE — DISCHARGE NOTE PROVIDER - NSDCMRMEDTOKEN_GEN_ALL_CORE_FT
aspirin 81 mg oral tablet: 1 tab(s) orally once a day  atorvastatin 80 mg oral tablet: 1 tab(s) orally once a day (at bedtime)  Brilinta (ticagrelor) 90 mg oral tablet: 1 tab(s) orally 2 times a day  Entresto 49 mg-51 mg oral tablet: 1 tab(s) orally 2 times a day  levETIRAcetam 500 mg oral tablet: 1 tab(s) orally 2 times a day  metoprolol succinate 50 mg oral tablet, extended release: 1 tab(s) orally once a day  Multiple Vitamins oral tablet: 1 tab(s) orally once a day  Semglee 100 units/mL subcutaneous solution: 20 unit(s) subcutaneous once a day  Synjardy 12.5 mg-1000 mg oral tablet: 1 tab(s) orally 2 times a day  Trulicity Pen 0.75 mg/0.5 mL subcutaneous solution: subcutaneous every 7 days   Admelog SoloStar 100 units/mL injectable solution: 10 unit(s) injectable 3 times a day (before meals)   alcohol swabs : Apply topically to affected area 4 times a day   aspirin 81 mg oral tablet: 1 tab(s) orally once a day  atorvastatin 80 mg oral tablet: 1 tab(s) orally once a day (at bedtime)  Basaglar KwikPen 100 units/mL subcutaneous solution: 24 unit(s) subcutaneous every morning at 8 am  Brilinta (ticagrelor) 90 mg oral tablet: 1 tab(s) orally 2 times a day  Entresto 49 mg-51 mg oral tablet: 1 tab(s) orally 2 times a day  glucometer (per patient&#x27;s insurance): Test blood sugars four times a day. Dispense #1 glucometer.  Insulin Pen Needles, 4mm: 1 application subcutaneously 4 times a day. ** Use with insulin pen **   lancets: 1 application subcutaneously 4 times a day   levETIRAcetam 500 mg oral tablet: 1 tab(s) orally 2 times a day  metoprolol succinate 50 mg oral tablet, extended release: 1 tab(s) orally once a day  Multiple Vitamins oral tablet: 1 tab(s) orally once a day  test strips (per patient&#x27;s insurance): 1 application subcutaneously 4 times a day. ** Compatible with patient&#x27;s glucometer **

## 2022-11-17 NOTE — DISCHARGE NOTE PROVIDER - NSDCHHNEEDSERVICE_GEN_ALL_CORE
-- DO NOT REPLY / DO NOT REPLY ALL --  -- Message is from the [facility]--    COVID-19 Universal Screening: N/A - Not about scheduling    General Patient Message      Reason for Call: Patient was referred to them for a liver transplant, they need a referral for the patient in order to do the proceedure. She would like to speak with someone in the office about this. Please call her back.   Patient is scheduled with them next week on 03/31.       Caller Information       Type Contact Phone    03/23/2021 03:02 PM CDT Phone (Incoming) Pinky  [phone]     [facility].             Alternative phone number: [phone]    Turnaround time given to caller:   \"This message will be sent to [state Provider's name]. The clinical team will fulfill your request as soon as they review your message.\"     Medication teaching and assessment/Teaching and training

## 2022-11-17 NOTE — DISCHARGE NOTE PROVIDER - CARE PROVIDER_API CALL
Kathy Yu (DO)  Internal Medicine  [address] [facility], [address]  [place] [address]  Phone: [phone]  Fax: [phone]  Scheduled Appointment: 02/22/2023 09:00 AM    Valery Medrano (NP; RN)  NP in Family Health  [address] [facility] [facility] [address]  [place] [address]  Phone: [phone]  Fax: [phone]  Scheduled Appointment: 12/06/2022 11:15 AM   Kathy Yu (DO)  Internal Medicine  [address], [address]  [place] [address]  Phone: [phone]  Fax: [phone]  Scheduled Appointment: 02/22/2023 09:00 AM    Valery Medrano (NP; RN)  NP in Family Health  [address], [address]  [place] [address]  Phone: [phone]  Fax: [phone]  Scheduled Appointment: 12/06/2022 11:15 AM    PA ESPINOSA  Internal Medicine  [address] [place] [address]  Phone: [phone]  Fax: [phone])-  Follow Up Time:

## 2022-11-17 NOTE — DISCHARGE NOTE PROVIDER - HOSPITAL COURSE
56yo M pmh HTN, T2DM (on insulin, trulicity), CAD (s/p PCI on brillinta), EtOH overuse p/w - Presenting to the ED with 3 to 4 days of general malaise , ABD pain, and inability to tolerate p.o.  Patient reports symptoms have been gradual in onset.  Last alcoholic drink was 4 days ago (Tuesday). He ususally drinks a pint of liquor on the weekends. Has not been hospitalized for Etoh withdrawal.  Has abdominal discomfort throughout the abdomen with no associated change in bowel movements or urine symptoms. He says he has been compliant with his DM medications (He takes Basaglar and Trulicity). Denies SOB, CP, Diarrhea, melena, hematochezia, hematemesis     In ED: Received Calcium gluc, Valium 10, Zosyn, Vanc, 2L Bolus IVF     Hospital Course:    Patient was admitted to the MICU with DKA on insulin gtt.  pH 7.00/13/171. GAP > 37, Bicarb < 7; BHB: > 9. 56yo M pmh HTN, T2DM (on insulin, trulicity), CAD (s/p PCI on brillinta), EtOH overuse p/w - Presenting to the ED with 3 to 4 days of general malaise , ABD pain, and inability to tolerate p.o.  Patient reports symptoms have been gradual in onset.  Last alcoholic drink was 4 days ago (Tuesday). He ususally drinks a pint of liquor on the weekends. Has not been hospitalized for Etoh withdrawal.  Has abdominal discomfort throughout the abdomen with no associated change in bowel movements or urine symptoms. He says he has been compliant with his DM medications (He takes Basaglar and Trulicity). Denies SOB, CP, Diarrhea, melena, hematochezia, hematemesis     In ED: Received Calcium gluc, Valium 10, Zosyn, Vanc, 2L Bolus IVF     Hospital Course:    Patient was admitted to the MICU with DKA on insulin gtt.  pH 7.00/13/171. GAP > 37, Bicarb < 7; BHB: > 9. Patient was transitioned to basal bolus insulin and was recommended to continue this on discharge. Patient was counseled to continue to hold Trulicity. Patient was also found to be thrombocytopenic and hematology was consulted. Hemolysis labs were negative. Patient was found to be EBV positive and was given fluids for supportive care during the stay. Patient was cleared for discharge by hematology and endocrinology. Patient was medically cleared on 11/18. 56yo M pmh HTN, T2DM (on insulin, trulicity), CAD (s/p PCI on brillinta), EtOH overuse p/w - Presenting to the ED with 3 to 4 days of general malaise , ABD pain, and inability to tolerate p.o.  Patient reports symptoms have been gradual in onset.  Last alcoholic drink was 4 days ago (Tuesday). He ususally drinks a pint of liquor on the weekends. Has not been hospitalized for Etoh withdrawal.  Has abdominal discomfort throughout the abdomen with no associated change in bowel movements or urine symptoms. He says he has been compliant with his DM medications (He takes Basaglar and Trulicity). Denies SOB, CP, Diarrhea, melena, hematochezia, hematemesis     In ED: Received Calcium gluc, Valium 10, Zosyn, Vanc, 2L Bolus IVF     Hospital Course:    Patient was admitted to the MICU with DKA on insulin gtt.  pH 7.00/13/171. GAP > 37, Bicarb < 7; BHB: > 9. Patient was transitioned to basal bolus insulin and was recommended to continue this on discharge. Patient was counseled to continue to hold Trulicity. Patient was also found to be thrombocytopenic and hematology was consulted. Hemolysis labs were negative. Patient was found to be EBV positive and was given fluids for supportive care during the stay. Patient was cleared for discharge by hematology and endocrinology. Patient was medically cleared on 11/18 and advised to follow up with PCP in 1 week for CBC.

## 2022-11-18 ENCOUNTER — NON-APPOINTMENT (OUTPATIENT)
Age: 57
End: 2022-11-18

## 2022-11-18 ENCOUNTER — TRANSCRIPTION ENCOUNTER (OUTPATIENT)
Age: 57
End: 2022-11-18

## 2022-11-18 VITALS
TEMPERATURE: 98 F | HEART RATE: 75 BPM | OXYGEN SATURATION: 100 % | RESPIRATION RATE: 18 BRPM | DIASTOLIC BLOOD PRESSURE: 83 MMHG | SYSTOLIC BLOOD PRESSURE: 125 MMHG

## 2022-11-18 LAB
ANION GAP SERPL CALC-SCNC: 7 MMOL/L — SIGNIFICANT CHANGE UP (ref 7–14)
B19V DNA FLD QL NAA+PROBE: SIGNIFICANT CHANGE UP IU/ML
BASOPHILS # BLD AUTO: 0.02 K/UL — SIGNIFICANT CHANGE UP (ref 0–0.2)
BASOPHILS NFR BLD AUTO: 0.6 % — SIGNIFICANT CHANGE UP (ref 0–2)
BLD GP AB SCN SERPL QL: NEGATIVE — SIGNIFICANT CHANGE UP
BUN SERPL-MCNC: 19 MG/DL — SIGNIFICANT CHANGE UP (ref 7–23)
CALCIUM SERPL-MCNC: 8.9 MG/DL — SIGNIFICANT CHANGE UP (ref 8.4–10.5)
CHLORIDE SERPL-SCNC: 97 MMOL/L — LOW (ref 98–107)
CO2 SERPL-SCNC: 26 MMOL/L — SIGNIFICANT CHANGE UP (ref 22–31)
CREAT SERPL-MCNC: 0.7 MG/DL — SIGNIFICANT CHANGE UP (ref 0.5–1.3)
CULTURE RESULTS: SIGNIFICANT CHANGE UP
CULTURE RESULTS: SIGNIFICANT CHANGE UP
EBV EA AB SER IA-ACNC: <5 U/ML — SIGNIFICANT CHANGE UP
EBV EA AB TITR SER IF: POSITIVE
EBV EA IGG SER-ACNC: NEGATIVE — SIGNIFICANT CHANGE UP
EBV NA IGG SER IA-ACNC: 68.4 U/ML — HIGH
EBV PATRN SPEC IB-IMP: SIGNIFICANT CHANGE UP
EBV VCA IGG AVIDITY SER QL IA: POSITIVE
EBV VCA IGM SER IA-ACNC: <10 U/ML — SIGNIFICANT CHANGE UP
EBV VCA IGM SER IA-ACNC: >750 U/ML — HIGH
EBV VCA IGM TITR FLD: NEGATIVE — SIGNIFICANT CHANGE UP
EGFR: 109 ML/MIN/1.73M2 — SIGNIFICANT CHANGE UP
EOSINOPHIL # BLD AUTO: 0.11 K/UL — SIGNIFICANT CHANGE UP (ref 0–0.5)
EOSINOPHIL NFR BLD AUTO: 3.3 % — SIGNIFICANT CHANGE UP (ref 0–6)
GLUCOSE BLDC GLUCOMTR-MCNC: 232 MG/DL — HIGH (ref 70–99)
GLUCOSE BLDC GLUCOMTR-MCNC: 277 MG/DL — HIGH (ref 70–99)
GLUCOSE SERPL-MCNC: 249 MG/DL — HIGH (ref 70–99)
HCT VFR BLD CALC: 31.6 % — LOW (ref 39–50)
HGB BLD-MCNC: 10.9 G/DL — LOW (ref 13–17)
IANC: 1.37 K/UL — LOW (ref 1.8–7.4)
IMM GRANULOCYTES NFR BLD AUTO: 1.5 % — HIGH (ref 0–0.9)
LYMPHOCYTES # BLD AUTO: 1.33 K/UL — SIGNIFICANT CHANGE UP (ref 1–3.3)
LYMPHOCYTES # BLD AUTO: 39.8 % — SIGNIFICANT CHANGE UP (ref 13–44)
MAGNESIUM SERPL-MCNC: 1.7 MG/DL — SIGNIFICANT CHANGE UP (ref 1.6–2.6)
MCHC RBC-ENTMCNC: 30.3 PG — SIGNIFICANT CHANGE UP (ref 27–34)
MCHC RBC-ENTMCNC: 34.5 GM/DL — SIGNIFICANT CHANGE UP (ref 32–36)
MCV RBC AUTO: 87.8 FL — SIGNIFICANT CHANGE UP (ref 80–100)
MONOCYTES # BLD AUTO: 0.46 K/UL — SIGNIFICANT CHANGE UP (ref 0–0.9)
MONOCYTES NFR BLD AUTO: 13.8 % — SIGNIFICANT CHANGE UP (ref 2–14)
NEUTROPHILS # BLD AUTO: 1.37 K/UL — LOW (ref 1.8–7.4)
NEUTROPHILS NFR BLD AUTO: 41 % — LOW (ref 43–77)
NRBC # BLD: 0 /100 WBCS — SIGNIFICANT CHANGE UP (ref 0–0)
NRBC # FLD: 0 K/UL — SIGNIFICANT CHANGE UP (ref 0–0)
PHOSPHATE SERPL-MCNC: 2.9 MG/DL — SIGNIFICANT CHANGE UP (ref 2.5–4.5)
PLATELET # BLD AUTO: 139 K/UL — LOW (ref 150–400)
POTASSIUM SERPL-MCNC: 4.9 MMOL/L — SIGNIFICANT CHANGE UP (ref 3.5–5.3)
POTASSIUM SERPL-SCNC: 4.9 MMOL/L — SIGNIFICANT CHANGE UP (ref 3.5–5.3)
RBC # BLD: 3.6 M/UL — LOW (ref 4.2–5.8)
RBC # FLD: 11.7 % — SIGNIFICANT CHANGE UP (ref 10.3–14.5)
RH IG SCN BLD-IMP: POSITIVE — SIGNIFICANT CHANGE UP
SODIUM SERPL-SCNC: 130 MMOL/L — LOW (ref 135–145)
SPECIMEN SOURCE: SIGNIFICANT CHANGE UP
SPECIMEN SOURCE: SIGNIFICANT CHANGE UP
TM INTERPRETATION: SIGNIFICANT CHANGE UP
WBC # BLD: 3.34 K/UL — LOW (ref 3.8–10.5)
WBC # FLD AUTO: 3.34 K/UL — LOW (ref 3.8–10.5)

## 2022-11-18 PROCEDURE — 99233 SBSQ HOSP IP/OBS HIGH 50: CPT | Mod: GC

## 2022-11-18 RX ORDER — METOPROLOL TARTRATE 50 MG
1 TABLET ORAL
Qty: 0 | Refills: 0 | DISCHARGE
Start: 2022-11-18

## 2022-11-18 RX ORDER — DULAGLUTIDE 4.5 MG/.5ML
0 INJECTION, SOLUTION SUBCUTANEOUS
Qty: 0 | Refills: 0 | DISCHARGE

## 2022-11-18 RX ORDER — ISOPROPYL ALCOHOL, BENZOCAINE .7; .06 ML/ML; ML/ML
1 SWAB TOPICAL
Qty: 100 | Refills: 1
Start: 2022-11-18 | End: 2023-01-06

## 2022-11-18 RX ORDER — EMPAGLIFLOZIN, METFORMIN HYDROCHLORIDE 10; 1000 MG/1; MG/1
1 TABLET, EXTENDED RELEASE ORAL
Qty: 0 | Refills: 0 | DISCHARGE

## 2022-11-18 RX ORDER — INSULIN GLARGINE 100 [IU]/ML
24 INJECTION, SOLUTION SUBCUTANEOUS
Qty: 5 | Refills: 0
Start: 2022-11-18 | End: 2022-12-17

## 2022-11-18 RX ORDER — INSULIN GLARGINE 100 [IU]/ML
20 INJECTION, SOLUTION SUBCUTANEOUS
Qty: 0 | Refills: 0 | DISCHARGE

## 2022-11-18 RX ORDER — INSULIN LISPRO 100/ML
10 VIAL (ML) SUBCUTANEOUS
Qty: 5 | Refills: 0
Start: 2022-11-18 | End: 2022-12-17

## 2022-11-18 RX ADMIN — INSULIN GLARGINE 24 UNIT(S): 100 INJECTION, SOLUTION SUBCUTANEOUS at 09:03

## 2022-11-18 RX ADMIN — Medication 2: at 09:05

## 2022-11-18 RX ADMIN — Medication 3: at 12:39

## 2022-11-18 RX ADMIN — Medication 50 MILLIGRAM(S): at 06:11

## 2022-11-18 RX ADMIN — SACUBITRIL AND VALSARTAN 1 TABLET(S): 24; 26 TABLET, FILM COATED ORAL at 06:12

## 2022-11-18 RX ADMIN — Medication 1 GRAM(S): at 06:11

## 2022-11-18 RX ADMIN — Medication 10 UNIT(S): at 12:39

## 2022-11-18 RX ADMIN — Medication 100 MILLIGRAM(S): at 11:36

## 2022-11-18 RX ADMIN — Medication 81 MILLIGRAM(S): at 11:36

## 2022-11-18 RX ADMIN — Medication 1 MILLIGRAM(S): at 11:36

## 2022-11-18 RX ADMIN — Medication 10 UNIT(S): at 09:05

## 2022-11-18 RX ADMIN — CHLORHEXIDINE GLUCONATE 1 APPLICATION(S): 213 SOLUTION TOPICAL at 11:37

## 2022-11-18 RX ADMIN — Medication 1 TABLET(S): at 11:37

## 2022-11-18 RX ADMIN — LEVETIRACETAM 500 MILLIGRAM(S): 250 TABLET, FILM COATED ORAL at 06:12

## 2022-11-18 RX ADMIN — TICAGRELOR 90 MILLIGRAM(S): 90 TABLET ORAL at 06:11

## 2022-11-18 RX ADMIN — PANTOPRAZOLE SODIUM 40 MILLIGRAM(S): 20 TABLET, DELAYED RELEASE ORAL at 06:11

## 2022-11-18 NOTE — CHART NOTE - NSCHARTNOTEFT_GEN_A_CORE
HEMATOLOGY FELLOW NOTE     58yo M pmh HTN, T2DM (on insulin, trulicity), CAD (s/p PCI on brillinta), EtOH overuse p/w - Presenting to the ED with 3 to 4 days of general malaise , ABD pain, and inability to tolerate PO. Admitted for DKA, lipase elevation thought to be etoh induced pancreatitis. Hematology consulted for pancytopenia.    No significant findings on peripheral smear reviewed 11/17/22. Counts have improved without any additional intervention. Peripheral flow cytometry was sent; will f/u.    Hematology signing off. Please re-consult if needed.    Eliecer Cordon MD  Hematology/Oncology Fellow PGY-4  Pager: [facility] [phone] / [place] [phone]   After 5pm and on weekends please page on-call fellow

## 2022-11-18 NOTE — PROGRESS NOTE ADULT - PROBLEM SELECTOR PLAN 1
A1c 10.6. Found to be in DKA here, s/p insulin gtt. pH 7.00/13/171. GAP > 37, Bicarb < 7; BHB: > 9 on presentation.   - Of note, documentation shows patient was discharged with Afrezza for DM, but patient states he does not take it. He only takes Basaglar and Trulicity at home. He also was ordered for Synjardy  -- as per endo recs, continue Lantus 20 U daily and Humalog to 8 U qac   - Patient knows how to inject insulin  - c/w ISS  - Appreciate endo recs.

## 2022-11-18 NOTE — DISCHARGE NOTE NURSING/CASE MANAGEMENT/SOCIAL WORK - PATIENT PORTAL LINK FT
You can access the FollowMyHealth Patient Portal offered by [facility] by registering at the following website: [email or web address]. By joining [facility]’s FollowMyHealth portal, you will also be able to view your health information using other applications (apps) compatible with our system.

## 2022-11-18 NOTE — PROGRESS NOTE ADULT - ATTENDING COMMENTS
57 y.o. M w/ a hx of HTN, DM2 on Trulicity, CAD s/p PIC, Etoh use hospitalized for DKA c/b pancytopenia.     Patient feels well, good pO intake. Has not ambulated today. Patient remains confident he will stop drinking when he returns home. Is not interested in AA or other resources at this time.     # DM c/b DKA: DKA now resolved, continue basal/bolus Insulin. Reinforce DM/Insulin teaching. Would benefit from VNS services for medication education.   # Pancytopenia: Unclear etiology. Has a hx of Etoh use d/o though counts were normal on presentation. Plt count improving without intervention. Advised pt to follow up with PCP and have CBC drawn in 1-2 weeks to monitor for improvement.   # Etoh us: No s/s of withdrawal while hospitalized. Understands and agrees to remain abstinent on discharge.
57 y.o. M w/ a hx of HTN, DM2 on Trulicity, CAD s/p PIC, Etoh use hospitalized for DKA c/b pancytopenia.     Patient feels well, has been tolerating PO, no abdominal pain. Has had some lightheadedness with ambulation. Also notes some neck pain.   Patient reports he began drinking alcohol after his wife passed away 2 years ago. He does not have any plans on continuing when he goes home.     # DM c/b DKA: DKA now resolved, continue basal/bolus Insulin. Reinforce DM/Insulin teaching. Would benefit from VNS services for medication education.   # Pancytopenia: Unclear etiology. Has a hx of Etoh use d/o though counts were normal on presentation. Heme following.   # Etoh us: No s/s of withdrawal while hospitalized. Understands and agrees to remain abstinent on discharge.
57 y.o. M w/ a hx of HTN, DM2 on Trulicity, CAD s/p PIC, Etoh use hospitalized for DKA c/b pancytopenia.     Patient feels well, has been tolerating PO, no abdominal pain. Last drank the weekend before hospitalization. Has not been told he had pancytopenia in the past. Stopped taking Insulin because his Endocrinologist told him to switch to Trulicity only. PE notable for healing L arm laceration, bruises and healing wounds in LE.     # DM c/b DKA: DKA now resolved, continue basal/bolus Insulin. Reinforce DM/Insulin teaching. Would benefit from VNS services for medication education.   # Pancytopenia: Unclear etiology. Has a hx of Etoh use d/o though counts were normal on presentation. Does not appear to clinically have pancreatitis. Heme consulted.   # LUE laceration: Due for next plastics office visit, will notify of admission.

## 2022-11-18 NOTE — PROGRESS NOTE ADULT - SUBJECTIVE AND OBJECTIVE BOX
PROGRESS NOTE:   Authored by Harriet Pimentel MD  Pager: [facility] [phone]; [facility] [phone]    Patient is a 57y old  Male who presents with a chief complaint of DKA/Etoh [facility] (17 Nov 2022 06:26)      SUBJECTIVE / OVERNIGHT EVENTS:  NAEON. This AM, denies CP, SOB, subjective f/c, n/v.   All other review of systems is negative unless indicated above.    MEDICATIONS  (STANDING):  aspirin  chewable 81 milliGRAM(s) Oral daily  atorvastatin 80 milliGRAM(s) Oral at bedtime  chlorhexidine 2% Cloths 1 Application(s) Topical daily  dextrose 5%. 1000 milliLiter(s) (50 mL/Hr) IV Continuous <Continuous>  dextrose 5%. 1000 milliLiter(s) (100 mL/Hr) IV Continuous <Continuous>  dextrose 50% Injectable 25 Gram(s) IV Push once  dextrose 50% Injectable 12.5 Gram(s) IV Push once  dextrose 50% Injectable 25 Gram(s) IV Push once  folic acid 1 milliGRAM(s) Oral daily  glucagon  Injectable 1 milliGRAM(s) IntraMuscular once  influenza   Vaccine 0.5 milliLiter(s) IntraMuscular once  insulin glargine Injectable (LANTUS) 24 Unit(s) SubCutaneous <User Schedule>  insulin lispro (ADMELOG) corrective regimen sliding scale   SubCutaneous three times a day before meals  insulin lispro (ADMELOG) corrective regimen sliding scale   SubCutaneous at bedtime  insulin lispro Injectable (ADMELOG) 10 Unit(s) SubCutaneous three times a day before meals  levETIRAcetam 500 milliGRAM(s) Oral two times a day  metoprolol succinate ER 50 milliGRAM(s) Oral daily  multivitamin 1 Tablet(s) Oral daily  pantoprazole    Tablet 40 milliGRAM(s) Oral before breakfast  sacubitril 49 mG/valsartan 51 mG 1 Tablet(s) Oral two times a day  senna 2 Tablet(s) Oral at bedtime  sodium chloride 0.9%. 1000 milliLiter(s) (75 mL/Hr) IV Continuous <Continuous>  sucralfate 1 Gram(s) Oral two times a day  thiamine 100 milliGRAM(s) Oral daily  ticagrelor 90 milliGRAM(s) Oral every 12 hours    MEDICATIONS  (PRN):  aluminum hydroxide/magnesium hydroxide/simethicone Suspension 30 milliLiter(s) Oral every 6 hours PRN Dyspepsia  dextrose Oral Gel 15 Gram(s) Oral once PRN Blood Glucose LESS THAN 70 milliGRAM(s)/deciliter  polyethylene glycol 3350 17 Gram(s) Oral daily PRN Constipation      CAPILLARY BLOOD GLUCOSE      POCT Blood Glucose.: 289 mg/dL (17 Nov 2022 22:21)  POCT Blood Glucose.: 313 mg/dL (17 Nov 2022 17:34)  POCT Blood Glucose.: 266 mg/dL (17 Nov 2022 12:24)  POCT Blood Glucose.: 214 mg/dL (17 Nov 2022 08:52)    I&O's Summary    17 Nov 2022 07:01  -  18 Nov 2022 07:00  --------------------------------------------------------  IN: 550 mL / OUT: 0 mL / NET: 550 mL        PHYSICAL EXAM:  Vital Signs Last 24 Hrs  T(C): 36.8 (18 Nov 2022 06:00), Max: 37.1 (17 Nov 2022 22:00)  T(F): 98.2 (18 Nov 2022 06:00), Max: 98.7 (17 Nov 2022 22:00)  HR: 75 (18 Nov 2022 06:00) (75 - 87)  BP: 125/83 (18 Nov 2022 06:00) (101/73 - 125/83)  BP(mean): --  RR: 18 (18 Nov 2022 06:00) (17 - 18)  SpO2: 100% (18 Nov 2022 06:00) (100% - 100%)    Parameters below as of 18 Nov 2022 06:00  Patient On (Oxygen Delivery Method): room air        GENERAL: No acute distress, well-developed  HEAD:  Atraumatic, Normocephalic  EYES: EOMI, PERRLA, conjunctiva and sclera clear  NECK: Supple, no lymphadenopathy, no JVD  CHEST/LUNG: CTAB; No wheezes, rales, or rhonchi  HEART: Regular rate and rhythm; No murmurs, rubs, or gallops  ABDOMEN: Soft, non-tender, non-distended; normal bowel sounds, no organomegaly  EXTREMITIES:  2+ peripheral pulses b/l, No clubbing, cyanosis, or edema. Right hand laceration, sutured  NEUROLOGY: A&O x 3, no focal deficits  SKIN: No rashes or lesions    LABS:                        10.9   3.34  )-----------( x        ( 18 Nov 2022 06:41 )             31.6     11-18    130<L>  |  97<L>  |  19  ----------------------------<  249<H>  4.9   |  26  |  0.70    Ca    8.9      18 Nov 2022 06:41  Phos  2.9     11-18  Mg     1.70     11-18    TPro  5.5<L>  /  Alb  3.1<L>  /  TBili  0.3  /  DBili  x   /  AST  48<H>  /  ALT  69<H>  /  AlkPhos  48  11-17                RADIOLOGY & ADDITIONAL TESTS:  Results Reviewed:   Imaging Personally Reviewed:  Electrocardiogram Personally Reviewed:    COORDINATION OF CARE:  Care Discussed with Consultants/Other Providers [Y/N]:  Prior or Outpatient Records Reviewed [Y/N]:

## 2022-11-18 NOTE — PROGRESS NOTE ADULT - NUTRITIONAL ASSESSMENT
This patient has been assessed with a concern for Malnutrition and has been determined to have a diagnosis/diagnoses of Severe protein-calorie malnutrition.    This patient is being managed with:   Diet Regular-  Consistent Carbohydrate {Evening Snack} (CSTCHOSN)  DASH/TLC {Sodium & Cholesterol Restricted} (DASH)  Supplement Feeding Modality:  Oral  Glucerna Shake Cans or Servings Per Day:  1       Frequency:  Daily  Entered: Nov 17 2022  3:01PM    
This patient has been assessed with a concern for Malnutrition and has been determined to have a diagnosis/diagnoses of Severe protein-calorie malnutrition.    This patient is being managed with:   Diet Regular-  Consistent Carbohydrate {Evening Snack} (CSTCHOSN)  DASH/TLC {Sodium & Cholesterol Restricted} (DASH)  Entered: Nov 15 2022  9:28AM

## 2022-11-18 NOTE — PROGRESS NOTE ADULT - ASSESSMENT
56yo M pmh HTN, T2DM (on insulin, trulicity), CAD (s/p PCI on brillinta), EtOH overuse presented on 11/13 with 3 to 4 days of general malaise , ABD pain, and inability to tolerate p.o, admitted to MICU for DKA (11/13-1/16), now admitted to general medicine floors for further management.

## 2022-11-18 NOTE — PROGRESS NOTE ADULT - PROVIDER SPECIALTY LIST ADULT
Endocrinology
Critical Care
Critical Care
Endocrinology
Internal Medicine

## 2022-11-18 NOTE — PROGRESS NOTE ADULT - PROBLEM SELECTOR PROBLEM 1
Poorly controlled type 2 diabetes mellitus
Poorly controlled type 2 diabetes mellitus
Type 2 diabetes mellitus

## 2022-11-18 NOTE — PROGRESS NOTE ADULT - PROBLEM SELECTOR PLAN 2
Platelets downtrending. Unclear etiology. Also with pancytopenia.  -possible 2/2 to ETOH use vs medication-induced vs viral  -entresto mainly known to cause anemia.   - EBV nuclear antigen positive.   - f/u peripheral smear  -  parvo, CMV, ADITYA, TSH, acute hep panel pending  - transfuse paltelet <!0K, <20k if febrile and <50k if bleeding  - transfuse Hgb<7  - Trend for now  - appreciate heme recs

## 2022-11-18 NOTE — DISCHARGE NOTE NURSING/CASE MANAGEMENT/SOCIAL WORK - NSDCVIVACCINE_GEN_ALL_CORE_FT
influenza, injectable, quadrivalent, preservative free; 08-Oct-2020 14:55; Nina Webb (RN); [employer]; [id number] (Exp. Date: 30-Jun-2021); IntraMuscular; Deltoid Right.; 0.5 milliLiter(s); VIS (VIS Published: 15-Aug-2019, VIS Presented: 08-Oct-2020);   Tdap; 12-Oct-2022 15:32; Mirta Park (RN); [employer]; [id number] (Exp. Date: 04-Nov-2024); IntraMuscular; Deltoid Right.; 0.5 milliLiter(s); VIS (VIS Published: 09-May-2013, VIS Presented: 12-Oct-2022);

## 2022-11-21 LAB — ANA TITR SER: NEGATIVE — SIGNIFICANT CHANGE UP

## 2022-12-21 ENCOUNTER — APPOINTMENT (OUTPATIENT)
Dept: ENDOCRINOLOGY | Facility: CLINIC | Age: 57
End: 2022-12-21

## 2022-12-21 VITALS
SYSTOLIC BLOOD PRESSURE: 130 MMHG | OXYGEN SATURATION: 98 % | HEIGHT: 65 IN | HEART RATE: 88 BPM | BODY MASS INDEX: 24.83 KG/M2 | DIASTOLIC BLOOD PRESSURE: 80 MMHG | WEIGHT: 149 LBS

## 2022-12-21 PROCEDURE — 99205 OFFICE O/P NEW HI 60 MIN: CPT

## 2022-12-21 RX ORDER — LANCETS 33 GAUGE
EACH MISCELLANEOUS
Qty: 1 | Refills: 3 | Status: ACTIVE | COMMUNITY
Start: 2022-12-21 | End: 1900-01-01

## 2022-12-21 RX ORDER — HUMAN INSULIN 100 [IU]/ML
(70-30) 100 INJECTION, SUSPENSION SUBCUTANEOUS
Refills: 0 | Status: DISCONTINUED | COMMUNITY
End: 2022-12-21

## 2022-12-21 RX ORDER — URINE ACETONE TEST STRIPS
STRIP MISCELLANEOUS
Qty: 1 | Refills: 0 | Status: ACTIVE | COMMUNITY
Start: 2022-12-21 | End: 1900-01-01

## 2022-12-22 NOTE — REVIEW OF SYSTEMS
[Recent Weight Gain (___ Lbs)] : recent weight gain: [unfilled] lbs [As Noted in HPI] : as noted in HPI [Swelling] : swelling [Negative] : Integumentary [Fatigue] : no fatigue [Decreased Appetite] : appetite not decreased [Recent Weight Loss (___ Lbs)] : no recent weight loss [Dysphagia] : no dysphagia [Neck Pain] : no neck pain [Dysphonia] : no dysphonia [Chest Pain] : no chest pain [Shortness Of Breath] : no shortness of breath [Nausea] : no nausea [Constipation] : no constipation [Abdominal Pain] : no abdominal pain [Vomiting] : no vomiting [Diarrhea] : no diarrhea [Polyuria] : no polyuria [Headaches] : no headaches [Dizziness] : no dizziness [Pain/Numbness of Digits] : no pain/numbness of digits [Polydipsia] : no polydipsia

## 2022-12-22 NOTE — PHYSICAL EXAM
[Alert] : alert [No Acute Distress] : no acute distress [Normal Sclera/Conjunctiva] : normal sclera/conjunctiva [EOMI] : extra ocular movement intact [No Proptosis] : no proptosis [No LAD] : no lymphadenopathy [Thyroid Not Enlarged] : the thyroid was not enlarged [No Respiratory Distress] : no respiratory distress [Clear to Auscultation] : lungs were clear to auscultation bilaterally [Normal S1, S2] : normal S1 and S2 [Normal Rate] : heart rate was normal [Normal Bowel Sounds] : normal bowel sounds [Not Tender] : non-tender [Not Distended] : not distended [Soft] : abdomen soft [Normal Anterior Cervical Nodes] : no anterior cervical lymphadenopathy [No Spinal Tenderness] : no spinal tenderness [Normal Gait] : normal gait [No Rash] : no rash [Right foot was examined, including] : right foot ~C was examined, including visual inspection with sensory and pulse exams [Left foot was examined, including] : left foot ~C was examined, including visual inspection with sensory and pulse exams [Normal] : normal [1+] : 1+ in the dorsalis pedis [Normal Reflexes] : deep tendon reflexes were 2+ and symmetric [Oriented x3] : oriented to person, place, and time [Normal Affect] : the affect was normal [Normal Mood] : the mood was normal [Diminished Throughout Both Feet] : normal tactile sensation with monofilament testing throughout both feet [de-identified] : trace BLE edema [FreeTextEntry1] : peeling skin [FreeTextEntry2] : nail discoloration [FreeTextEntry5] : peeling skin [FreeTextEntry6] : nail discoloration

## 2022-12-22 NOTE — HISTORY OF PRESENT ILLNESS
[FreeTextEntry1] : 56 yo M with PMH of CAD s/p STEMI (s/p PCI on Brilinta), TIA, HTN, HLD, h/o AUD, s/p DKA, here for hospital follow-up of T2DM.\par \par Hospital Course:\florentino Presented to the ED with 3 to 4 days of general malaise, abd pain, and inability to tolerate p.o found to be in alcohol withdrawal and DKA. Reports symptoms were gradual in onset. Last alcoholic drink was 4 days prior to hospitalization. Was drinking a pint of liquor on the weekends. Has not been hospitalized for Etoh withdrawal.  Has abdominal discomfort throughout the abdomen with no associated change in bowel movements or urine symptoms. He says he was been compliant with his DM medications (Taking Basaglar and Trulicity at time of hospitalization). \par Hospital course c/b pancytopenia, elevated lipase (378) with normal TG 94, there was concern for possible pancreatitis but had unremarkable abdominal imaging.\par  \par ENDOCRINE HISTORY\par Diagnosis: 8 years ago (T2DM)\par Previous Endo: Dr. Jeane Haq (PCP), had previous endo.\par Current DM Medications: Discharged on Basaglar 24 units qam (adherent); Admelog 10 units qac (sometimes misses lunch dose or skips if skipping a meal) \par Past DM medications: metformin (long time ago), injectable insulin (Novolin 70/30? or basal/bolus), Afrezza 12 units BID (stopped 2 months ago), Trulicity 0.75 mg (started 10/2022- used x 5 weeks, d/c at discharge), Synjardy 12.5-1000 mg BID (started 10/2022- d/c at discharge). Denies Farxiga (was mentioned in cardiology note).\par Last A1c: 10.6 % (10/2022) <-- 10.2% (3/2022). \par Has not had alcohol since discharge, reports he feels nauseated now when he thinks about alcohol. States he started drinking when he lost his wife to COVID. Lost 2 of his brothers this past year. \par \par Macrovascular Complications: +CAD s/p STEMI in 2/2022 (s/p PCI on Brilinta), +TIA (2 years ago). Borderline EF 49%. F/b cardiologist Dr. Morales.\par Eyes: Last ophthalmology visit 9/2022, f/u visit in 2/2022. Reports +mild retinopathy.\par Feet: Denies neuropathy. Reports mild BLE swelling since discharge. Feet can be sore from peeling skin. Has not been to podiatry.\par Nephrology: Creatinine 0.7, eGFR 109 (11/2022). Denies h/o nephropathy.\par If BG is high will have increased urination, used to have polydipsia but resolved.\par Reports weight gain of 5 lbs, and is actively trying to gain weight. \par \par Glucometer Readings reviewed: checks 2-4x/day. Noticing higher fbs since starting to eat roti again at night. \par fbs- 399 (had 3 cookies the night before), 372, 241, 282, 235, 253, 167, 252, 245, 215, 382, 269, 278, 217, 302, 312\par pre-dinner- 108, 91, 97, 193, 291, 104, 120, 165, 192\par Reports occasional shakiness in the afternoon when he gets home from work and FS would be in the 80s. Denies FS <70.  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 Unclear if he has received flu vaccine during hospitalization but had pancytopenia in hospital, reports had possible fungal infection then?\par \par PMH: T2DM, CAD with STEMI in 2/2022 (s/p PCI on Brilinta), TIA (2020), HTN, HLD, h/o AUD, s/p DKA (11/2022), h/o ETOH associated seizure, mild LV systolic dysfunction with EF 49%, has implantable loop recorder\par Social Hx: h/o ETOH use (states has stopped alcohol use)\par FHx: mother (TIA, DM), father (AUD), brother (passed from MI), sister (DM)\par Medications: Basaglar 24 units qam, Admelog 10 units qac, atorvastatin 80 mg qd, Entresto 49-51 mg BID, metoprolol ER 50 mg qd, aspirin 81 mg qd, Brilinta 90 mg BID, Keppra 300 mg BID\par Supplements: Centrum silver\par Occupation:

## 2022-12-22 NOTE — ASSESSMENT
[Diabetes Foot Care] : diabetes foot care [Long Term Vascular Complications] : long term vascular complications of diabetes [Carbohydrate Consistent Diet] : carbohydrate consistent diet [Importance of Diet and Exercise] : importance of diet and exercise to improve glycemic control, achieve weight loss and improve cardiovascular health [Exercise/Effect on Glucose] : exercise/effect on glucose [Hypoglycemia Management] : hypoglycemia management [Ketone Testing] : ketone testing [Action and use of Insulin] : action and use of short and long-acting insulin [Self Monitoring of Blood Glucose] : self monitoring of blood glucose [Retinopathy Screening] : Patient was referred to ophthalmology for retinopathy screening [FreeTextEntry1] : 56 yo M with PMH of CAD s/p STEMI (s/p PCI on Brilinta), TIA, HTN, HLD, h/o AUD, s/p recent DKA, here for hospital follow-up for Type 2 Diabetes.\par \par Type 2 Diabetes, uncontrolled:\par - A1c 10.6% (10/2022) <-- increased from 10.2% (3/2022). At high risk for DKA/HHS.\par - Glucometer reviewed: fasting hyperglycemia, pre-prandial in the 90s-100s, no hypoglycemia.\par - Will increase Basaglar to 28 units qam. After 1 week if fasting BG continues to be persistently >140, increase by 2 units.\par - Continue Admelog 10 units before meals. \par - Probably not a good candidate now for GLP-1 given risk for pancreatitis, or SGLT2i and metformin given h/o DKA and ETOH use.\par - Discussed hypoglycemia management.\par - Extensive counseling provided on diet modification, carb consistent diet, and exercise.\par - Patient on multiple daily insulin injections and checks FS 4x daily, will benefit from CGM. Will try to order Boby. Continue SMBG and call if persistent highs or lows.\par - Mild DR, recommend f/u with ophthalmology and podiatry annually and send reports.\par - Will check c-peptide with glucose today.\par \par HTN:\par - BP at goal 130/80 today.\par - On Entresto BID.\par - eGFR 109 (11/2022). Check urine alb/cr today.\par \par HLD:\par - LDL 80, TG 94 (11/2022).\par - Continue atorvastatin 80 mg qd.\par - Will repeat lipid panel.\par \par F/u in 2 months with Dr. Yu.\par \par Li Adams NP (Brenda)

## 2022-12-23 ENCOUNTER — APPOINTMENT (OUTPATIENT)
Dept: ENDOCRINOLOGY | Facility: CLINIC | Age: 57
End: 2022-12-23

## 2022-12-23 RX ORDER — FLASH GLUCOSE SENSOR
KIT MISCELLANEOUS
Qty: 2 | Refills: 5 | Status: ACTIVE | COMMUNITY
Start: 2022-12-23

## 2022-12-23 RX ORDER — FLASH GLUCOSE SCANNING READER
EACH MISCELLANEOUS
Qty: 1 | Refills: 0 | Status: ACTIVE | COMMUNITY
Start: 2022-12-23

## 2023-03-22 NOTE — DISCHARGE NOTE PROVIDER - CARE PROVIDER_API CALL
[FreeTextEntry1] : Pt reports with c/o vaginal dryness x 4 days. Denies vaginal discharge, pelvic pain, urinary frequency, fever, and chills. Pt reports she had unprotected intercourse on 3/19, partner did not ejaculate inside. Pt is not currently on birth control. Charlene Francis)  Internal Medicine  [address], [address]  [place] [address]  Phone: [phone]  Fax: [phone]  Follow Up Time:     Vannessa Escobar)  Cardiovascular Disease  [facility]Dept of Cardiology, [phone] [place] [address], [id number]  [place] [address]  Phone: [phone]  Fax: [phone]  Follow Up Time:

## 2023-05-24 NOTE — H&P ADULT - SKIN
Patient: Shelbi Howard    Procedure: Procedure(s):  HERNIORRHAPHY, INCISIONAL, ROBOT-ASSISTED, LAPAROSCOPIC, USING DA JUVENTINO XI       Anesthesia Type:  General    Note:  Disposition: Outpatient   Postop Pain Control: Uneventful            Sign Out: Well controlled pain   PONV: No   Neuro/Psych: Uneventful            Sign Out: Acceptable/Baseline neuro status   Airway/Respiratory: Uneventful            Sign Out: Acceptable/Baseline resp. status   CV/Hemodynamics: Uneventful            Sign Out: Acceptable CV status   Other NRE: NONE   DID A NON-ROUTINE EVENT OCCUR? No    Event details/Postop Comments:  Pt was happy with anesthesia care.  No complications.  I will follow up with the pt if needed.           Last vitals:  Vitals Value Taken Time   /70 05/24/23 1150   Temp 98  F (36.7  C) 05/24/23 1150   Pulse 67 05/24/23 1152   Resp 12 05/24/23 1130   SpO2 97 % 05/24/23 1152   Vitals shown include unvalidated device data.    Electronically Signed By: BERLIN Navarro CRNA  May 24, 2023  12:01 PM   warm and dry/color normal

## 2023-05-30 LAB
ALBUMIN SERPL ELPH-MCNC: 4.5 G/DL
ALP BLD-CCNC: 60 U/L
ALT SERPL-CCNC: 37 U/L
ANION GAP SERPL CALC-SCNC: 11 MMOL/L
AST SERPL-CCNC: 22 U/L
BILIRUB SERPL-MCNC: 0.3 MG/DL
BUN SERPL-MCNC: 32 MG/DL
C PEPTIDE SERPL-MCNC: 4.4 NG/ML
CALCIUM SERPL-MCNC: 9.6 MG/DL
CHLORIDE SERPL-SCNC: 100 MMOL/L
CHOLEST SERPL-MCNC: 222 MG/DL
CO2 SERPL-SCNC: 24 MMOL/L
CREAT SERPL-MCNC: 1.27 MG/DL
CREAT SPEC-SCNC: 48 MG/DL
EGFR: 67 ML/MIN/1.73M2
GLUCOSE SERPL-MCNC: 391 MG/DL
HDLC SERPL-MCNC: 78 MG/DL
LDLC SERPL CALC-MCNC: 112 MG/DL
MICROALBUMIN 24H UR DL<=1MG/L-MCNC: 4.6 MG/DL
MICROALBUMIN/CREAT 24H UR-RTO: 96 MG/G
NONHDLC SERPL-MCNC: 145 MG/DL
POTASSIUM SERPL-SCNC: 5.4 MMOL/L
PROT SERPL-MCNC: 6.7 G/DL
SODIUM SERPL-SCNC: 136 MMOL/L
TRIGL SERPL-MCNC: 161 MG/DL

## 2023-06-10 PROBLEM — I10 CHRONIC HYPERTENSION: Status: ACTIVE | Noted: 2022-03-11

## 2023-06-10 PROBLEM — Z86.73 HISTORY OF TIA (TRANSIENT ISCHEMIC ATTACK): Status: ACTIVE | Noted: 2022-03-11

## 2023-06-12 ENCOUNTER — APPOINTMENT (OUTPATIENT)
Dept: CARDIOLOGY | Facility: CLINIC | Age: 58
End: 2023-06-12
Payer: MEDICAID

## 2023-06-12 ENCOUNTER — NON-APPOINTMENT (OUTPATIENT)
Age: 58
End: 2023-06-12

## 2023-06-12 VITALS
OXYGEN SATURATION: 95 % | WEIGHT: 160 LBS | DIASTOLIC BLOOD PRESSURE: 111 MMHG | BODY MASS INDEX: 26.66 KG/M2 | SYSTOLIC BLOOD PRESSURE: 194 MMHG | HEIGHT: 65 IN | TEMPERATURE: 97.8 F | HEART RATE: 88 BPM

## 2023-06-12 DIAGNOSIS — Z86.73 PERSONAL HISTORY OF TRANSIENT ISCHEMIC ATTACK (TIA), AND CEREBRAL INFARCTION W/OUT RESIDUAL DEFICITS: ICD-10-CM

## 2023-06-12 DIAGNOSIS — I10 ESSENTIAL (PRIMARY) HYPERTENSION: ICD-10-CM

## 2023-06-12 PROCEDURE — 99214 OFFICE O/P EST MOD 30 MIN: CPT | Mod: 25

## 2023-06-12 PROCEDURE — 93000 ELECTROCARDIOGRAM COMPLETE: CPT

## 2023-06-12 RX ORDER — METOPROLOL SUCCINATE 50 MG/1
50 TABLET, EXTENDED RELEASE ORAL DAILY
Qty: 135 | Refills: 2 | Status: ACTIVE | COMMUNITY
Start: 2022-08-29 | End: 1900-01-01

## 2023-06-12 NOTE — HISTORY OF PRESENT ILLNESS
[FreeTextEntry1] : This is an outpatient f/u visit for Mr. Hernandez for CAD.\par \par 56 yo man\par H/o alcohol use disorder w/ prior hospitalization for alcohol withdrawl; stopped drinking since MI; no recent withdrawl (recently, had a few drinks during a celebration; last saturday; no alcohol since then)\par TIA \par HTN\par Poorly controlled diabetes\par Inferolateral STEMI s/p BECKY to pCX 100% occlusion, pLAD 30%, dOM 100% EF 30-35%, LVEDP 36 s/p lasix 40mg IVP x1. Had IABP. Course was c/b fevers of unknown origin. Rx'd with antibiotics.\par \par Labs Dec 2022:\par A1c 10.2%\par K 5.4, BUN 32, creat 1.27; LFT's normal\par tchol 222, trig 161, HDL 78,  mg/dL\par \par At last visit in Sept 2022, the following issues were discussed: \par Mr. Hernandez has not had a f/u echocardiogram. States he is compliant with his medications, but does not know them by name. States he takes a few twice a day and others once a day. Drinking a few beers "here and there"; much decreased from the past. No smoking. \par DM2 (diabetes mellitus, type 2) (250.00) (E11.9)\par  · Now followed by Endocrine; seems to be improving. I believe he is on Farsiga, but it is\par     not on his mediation list.\par Hypertension (401.9) (I10)\par  · Long h/o HTN, continue to monitor at home. Stable in office.\par STEMI (ST elevation myocardial infarction) (410.90) (I21.3)\par  · Inferolateral STEMI earlier this year; no CV symptoms. States he has significantly\par     decreased his alcohol and is compliant with medications, although could not name his\par     medications. Asked him to bring all medications to clinic next time to review. No\par     heart failure on exam. Again, I asked him to get a f/u echo to reassess\par     LVEF. Medication should continue with aspirin + Brillinta (for one year) in addition to\par     metoprolol and Entresto. Labs are followed by PCP, done recently. Goal LDL <<70\par     mg/dL; goal A1c 6.5%.\par \par Since last visit, no significant changes. No angina. No edema. Mild SOB with exertion. States he is compliant with his medications; daily. He knew his medications today. Continues on insulin rx for DM; followed by PCP, last A1c improved. He has not checked his home BP in a while. No headaches or dizziness.\par \par EKG today performed to f/u on CAD:\par Sinus rhythm \par Left anterior fascicular block\par Late transition; cannot exclude anterior MI\par ABNORMAL ECG\par \par \par \par \par

## 2023-06-12 NOTE — REVIEW OF SYSTEMS
[Dyspnea on exertion] : dyspnea during exertion [Negative] : Heme/Lymph [Fever] : no fever [Chills] : no chills [SOB] : no shortness of breath [Chest Discomfort] : no chest discomfort [Lower Ext Edema] : no extremity edema [Leg Claudication] : no intermittent leg claudication [Palpitations] : no palpitations [Orthopnea] : no orthopnea [PND] : no PND [Syncope] : no syncope

## 2023-06-12 NOTE — PHYSICAL EXAM
[Well Developed] : well developed [Well Nourished] : well nourished [No Acute Distress] : no acute distress [Normal Conjunctiva] : normal conjunctiva [Normal Venous Pressure] : normal venous pressure [No Carotid Bruit] : no carotid bruit [Normal S1, S2] : normal S1, S2 [No Murmur] : no murmur [No Rub] : no rub [No Gallop] : no gallop [Clear Lung Fields] : clear lung fields [Good Air Entry] : good air entry [No Respiratory Distress] : no respiratory distress  [Soft] : abdomen soft [Non Tender] : non-tender [No Masses/organomegaly] : no masses/organomegaly [Normal Bowel Sounds] : normal bowel sounds [Normal Gait] : normal gait [No Edema] : no edema [No Cyanosis] : no cyanosis [No Clubbing] : no clubbing [No Varicosities] : no varicosities [No Rash] : no rash [No Skin Lesions] : no skin lesions [Moves all extremities] : moves all extremities [No Focal Deficits] : no focal deficits [Normal Speech] : normal speech [Alert and Oriented] : alert and oriented [Normal memory] : normal memory [de-identified] : BP remains elevated 200/100 mmHg on repeat testing.

## 2023-06-23 ENCOUNTER — APPOINTMENT (OUTPATIENT)
Dept: ENDOCRINOLOGY | Facility: CLINIC | Age: 58
End: 2023-06-23
Payer: MEDICAID

## 2023-06-23 VITALS
HEIGHT: 65 IN | WEIGHT: 164 LBS | HEART RATE: 66 BPM | OXYGEN SATURATION: 97 % | BODY MASS INDEX: 27.32 KG/M2 | DIASTOLIC BLOOD PRESSURE: 110 MMHG | TEMPERATURE: 98.2 F | SYSTOLIC BLOOD PRESSURE: 158 MMHG

## 2023-06-23 PROCEDURE — 99215 OFFICE O/P EST HI 40 MIN: CPT

## 2023-06-23 PROCEDURE — 82962 GLUCOSE BLOOD TEST: CPT

## 2023-06-23 PROCEDURE — 83036 HEMOGLOBIN GLYCOSYLATED A1C: CPT | Mod: QW

## 2023-06-26 ENCOUNTER — APPOINTMENT (OUTPATIENT)
Dept: CV DIAGNOSITCS | Facility: HOSPITAL | Age: 58
End: 2023-06-26
Payer: MEDICAID

## 2023-06-26 ENCOUNTER — OUTPATIENT (OUTPATIENT)
Dept: OUTPATIENT SERVICES | Facility: HOSPITAL | Age: 58
LOS: 1 days | End: 2023-06-26

## 2023-06-26 DIAGNOSIS — I21.3 ST ELEVATION (STEMI) MYOCARDIAL INFARCTION OF UNSPECIFIED SITE: ICD-10-CM

## 2023-06-26 DIAGNOSIS — Z95.818 PRESENCE OF OTHER CARDIAC IMPLANTS AND GRAFTS: Chronic | ICD-10-CM

## 2023-06-26 LAB
GLUCOSE BLDC GLUCOMTR-MCNC: 266
HBA1C MFR BLD HPLC: 9.7

## 2023-06-26 PROCEDURE — 93306 TTE W/DOPPLER COMPLETE: CPT | Mod: 26

## 2023-06-26 NOTE — ASSESSMENT
[FreeTextEntry1] : #Type 2 diabetes\par -Patient with longstanding history of type 2 diabetes which has remained uncontrolled.  Current regimen includes glargine 28 units daily along with Admelog 10 units 3 times daily with meals.  Patient admits that he skips his lunchtime Admelog because he was under the impression that his insulin needs to remain in the ice box the entire day and it was very inconvenient for him.  We spoke about how 1 can remain at room temperature for 20 days after opening the pen.\par -His A1c today is 9.7, I suspect this is all due to patient not taking his insulin.\par Plan:\par -Continue with glargine 28 units daily\par -Continue with Admelog 10 units 3 times daily with meals-stressed compliance with the patient\par -Patient is also not checking blood sugars at home.  He brought his freestyle allan that was prescribed to him however he did not know how to put him on.  We will do a teach with him to show him how to place the allan and how to check his sugars at home\par \par Patient counseled extensively about the complications of diabetes including but not limited to nephropathy, neuropathy, and retinopathy. We discussed the importance of annual foot and optho exams. Explained that ideally blood sugars in the morning prior to breakfast should be between 80 and 130. Blood sugars should be checked 2 hours after eating and should be <180. If blood sugar is <70, patient should treat the blood sugar FIRST and then contact provider. Advised patient to let us know if BG persistently <70 or >200\par \par #HLD\par -Continue with statin

## 2023-06-26 NOTE — HISTORY OF PRESENT ILLNESS
[FreeTextEntry1] : #Diabetes Mellitus Type 2    \par \par Diagnosis- 12 years ago \par Current regimen: Glargine 28 units in the morning + Admleog 10units with meals \par will skip lunch \par \par Signs/symptoms: \par Last HgbA1c: \par Home blood sugars: \par 4am 100-200\par lunch- 99\par dinner- 100s\par Hypoglycemia: 53 \par \par History of CAD: 1 year ago \par History of CVA: mini stroke - 3 years ago \par FH of diabetes: Mother with T2DM\par \par \par Diet: \par Breakfast: oatmeal \par Lunch: pizza, chicken sandwich \par Dinner: rice/roti + vegetables/chicken/shrimp \par Snacks: only if sugar is low \par Drinks: no juice or soda \par \par Exercise: \par active during work \par \par eGFR: 67     \par \par AST: 22\par ALT: 37\par \par Last eye exam: UTD\par Evidence of retinopathy? ?possible retinopathy? \par \par Last foot exam: due\par Any issues? denies \par \par Social History: \par Alcohol: used to drink previously heavily not anymore \par Tobacco: Denies\par Work: driving\par \par #HLD\par - Atorvastatin 80mg daily \par \par \par

## 2023-06-26 NOTE — ADDENDUM
[FreeTextEntry1] : Patient came in with  allan 2 sensor sample. Patient educated on allan sensor and reader ( tyler downloaded to smartphone Andriod). Importance of checking Blood Glucose, frequent monitoring, keeping reader nearby for safety, and alarms reviewed with patient. Process of placing and replacement of a sensor reviewed with patient. All questions answered, and encouraged patient to call office if she has any further questions/concerns, verbalized understanding

## 2023-07-12 NOTE — ED ADULT TRIAGE NOTE - HEART RATE (BEATS/MIN)
110 Quality 130: Documentation Of Current Medications In The Medical Record: Current Medications Documented Quality 226: Preventive Care And Screening: Tobacco Use: Screening And Cessation Intervention: Patient screened for tobacco use and is an ex/non-smoker Quality 110: Preventive Care And Screening: Influenza Immunization: Influenza Immunization previously received during influenza season Detail Level: Detailed Quality 431: Preventive Care And Screening: Unhealthy Alcohol Use - Screening: Patient screened for unhealthy alcohol use using a single question and scores less than 2 times per year

## 2023-07-24 ENCOUNTER — NON-APPOINTMENT (OUTPATIENT)
Age: 58
End: 2023-07-24

## 2023-07-24 ENCOUNTER — APPOINTMENT (OUTPATIENT)
Dept: CARDIOLOGY | Facility: CLINIC | Age: 58
End: 2023-07-24
Payer: MEDICAID

## 2023-07-24 VITALS
HEART RATE: 63 BPM | WEIGHT: 164 LBS | SYSTOLIC BLOOD PRESSURE: 173 MMHG | HEIGHT: 65 IN | DIASTOLIC BLOOD PRESSURE: 102 MMHG | BODY MASS INDEX: 27.32 KG/M2 | OXYGEN SATURATION: 95 %

## 2023-07-24 PROCEDURE — 93000 ELECTROCARDIOGRAM COMPLETE: CPT

## 2023-07-24 PROCEDURE — 99214 OFFICE O/P EST MOD 30 MIN: CPT | Mod: 25

## 2023-07-24 NOTE — PHYSICAL EXAM
[Well Developed] : well developed [Well Nourished] : well nourished [No Acute Distress] : no acute distress [Normal Conjunctiva] : normal conjunctiva [Normal Venous Pressure] : normal venous pressure [No Carotid Bruit] : no carotid bruit [Normal S1, S2] : normal S1, S2 [No Murmur] : no murmur [No Rub] : no rub [No Gallop] : no gallop [Clear Lung Fields] : clear lung fields [Good Air Entry] : good air entry [No Respiratory Distress] : no respiratory distress  [Soft] : abdomen soft [Non Tender] : non-tender [No Masses/organomegaly] : no masses/organomegaly [Normal Bowel Sounds] : normal bowel sounds [Normal Gait] : normal gait [No Edema] : no edema [No Cyanosis] : no cyanosis [No Clubbing] : no clubbing [No Varicosities] : no varicosities [No Rash] : no rash [No Skin Lesions] : no skin lesions [Moves all extremities] : moves all extremities [No Focal Deficits] : no focal deficits [Normal Speech] : normal speech [Alert and Oriented] : alert and oriented [Normal memory] : normal memory [de-identified] : BP remains elevated 170/100 mmHg on repeat testing.

## 2023-07-24 NOTE — HISTORY OF PRESENT ILLNESS
[FreeTextEntry1] : This is an outpatient f/u visit for Mr. Hernandez for CAD.\par \par 59 yo man\par H/o alcohol use disorder w/ prior hospitalization for alcohol withdrawal; stopped drinking since MI; no recent withdrawal (recently, had a few drinks during a celebration; last saturday; no alcohol since then)\par TIA \par HTN\par Poorly controlled diabetes\par Inferolateral STEMI s/p BECKY to pCX 100% occlusion, pLAD 30%, dOM 100% EF 30-35%, LVEDP 36 s/p lasix 40mg IVP x1. Had IABP. Course was c/b fevers of unknown origin. Rx'd with antibiotics.\par \par Labs Dec 2022:\par A1c 10.2%\par K 5.4, BUN 32, creat 1.27; LFT's normal\par tchol 222, trig 161, HDL 78,  mg/dL\par \par At last visit in June 2023, the following issues were discussed: \par Chronic hypertension (401.9) (I10)\par DM2 (diabetes mellitus, type 2) (250.00) (E11.9)\par  · Followed by Endocrine; seems to be improving. continue to monitor with PCP.\par Hypertension (401.9) (I10)\par  · Long h/o HTN, continue to monitor at home. Unclear why his BP is elevated today. States\par     he is compliant at home. Recently started a "vitamin" from [place], supposed to make\par     him "feel better". I suggest he stop it; unclear what is in the rx. Will increase\par     his Entesto and bblocker rx today; in addition, I renewed his other prescriptions. Seeing\par     his endocrinologist on June 22; will have BP and labs checked at that time. He\par     expressed full understanding of the improtance of medication compliance, stopping\par     alcohol, monitoring BP.\par STEMI (ST elevation myocardial infarction) (410.90) (I21.3)\par  · Inferolateral STEMI earlier this year. No chest pain; mild VILLASENOR. States he iscompliant with\par     medications; I again asked him to bring all medications to clinic next time to review.\par     No heart failure on exam. Again, I asked him to get a f/u echo to reassess\par     LVEF. Medication should continue with aspirin + Brillinta (for one year) in addition to\par     metoprolol and Entresto. Labs are followed by PCP, done recently. Goal LDL <<70\par     mg/dL; goal A1c 6.5%.\par History of TIA (transient ischemic attack) (V12.54) (Z86.73)\par Chest pain (786.50) (R07.9)\par  · No active chest pain.\par No alcohol use\par  · H/o alcohol use disorder; was sober, now back to drinking a bit (last drinks were 2 days\par     ago). We discussed the need to completely stop drinking alcohol; he understands the\par     importance of this.\par \par Labs June 2023:\par A1c 9.7%; working with Endocrine; recent adjustments\par \par Echo June 2023:\par LVEF 40%, anterolateral, inferolateral basal to mid inferior wall hypokinesis; no thrombus\par Mild diastolic dysfunction, stage I\par \par Since last visit, he reports compliance with his medications. Notes BP still remains elevated with systolic 160s-170s. Reports some better control of his blood sugars since recently being instructed on proper insulin regimen by endocrinology. Sometimes sugars up to 250. Does report some mild VILLASENOR when lifting items at work, improving over the past few months. Denies chest pain. Reports intermittent episodes of palpitations in the past few months, described as a racing heart lasting < 20 minutes, almost completely resolved since he stopped drinking. Reports his last drink was 2 months ago, none since last visit. Denies PND or orthopnea. BP at home this am, 170/81 mmHg.\par \par EKG today performed to f/u on CAD:\par Sinus rhythm \par RSR' V1V2\par Early transition\par Nonspecific T wave abnormalites\par ABNORMAL ECG\par \par \par \par \par \par

## 2023-07-25 LAB
ALBUMIN SERPL ELPH-MCNC: 4.8 G/DL
ALP BLD-CCNC: 65 U/L
ALT SERPL-CCNC: 37 U/L
ANION GAP SERPL CALC-SCNC: 19 MMOL/L
AST SERPL-CCNC: 22 U/L
BILIRUB SERPL-MCNC: 0.2 MG/DL
BUN SERPL-MCNC: 22 MG/DL
CALCIUM SERPL-MCNC: 9.4 MG/DL
CHLORIDE SERPL-SCNC: 100 MMOL/L
CHOLEST SERPL-MCNC: 131 MG/DL
CO2 SERPL-SCNC: 21 MMOL/L
CREAT SERPL-MCNC: 1.01 MG/DL
EGFR: 87 ML/MIN/1.73M2
ESTIMATED AVERAGE GLUCOSE: 255 MG/DL
GLUCOSE SERPL-MCNC: 51 MG/DL
HBA1C MFR BLD HPLC: 10.5 %
HDLC SERPL-MCNC: 59 MG/DL
LDLC SERPL CALC-MCNC: 55 MG/DL
NONHDLC SERPL-MCNC: 72 MG/DL
POTASSIUM SERPL-SCNC: 3.4 MMOL/L
PROT SERPL-MCNC: 7.1 G/DL
SODIUM SERPL-SCNC: 140 MMOL/L
TRIGL SERPL-MCNC: 92 MG/DL
TSH SERPL-ACNC: 1.55 UIU/ML

## 2023-09-06 NOTE — HISTORY OF PRESENT ILLNESS
[FreeTextEntry1] : This is an outpatient f/u visit for Mr. Hernandez for CAD.  59 yo man H/o alcohol use disorder w/ prior hospitalization for alcohol withdrawal; stopped drinking since MI; no recent withdrawal (recently, had a few drinks during a celebration; last saturday; no alcohol since then) TIA  HTN Poorly controlled diabetes Inferolateral STEMI s/p BECKY to pCX 100% occlusion, pLAD 30%, dOM 100% EF 30-35%, LVEDP 36 s/p lasix 40mg IVP x1. Had IABP. Course was c/b fevers of unknown origin. Rx'd with antibiotics.  Labs Dec 2022: A1c 10.2% K 5.4, BUN 32, creat 1.27; LFT's normal tchol 222, trig 161, HDL 78,  mg/dL   Labs June 2023: A1c 9.7%; working with Endocrine; recent adjustments  Echo June 2023: LVEF 40%, anterolateral, inferolateral basal to mid inferior wall hypokinesis; no thrombus Mild diastolic dysfunction, stage I  At last visit in July 2023, the following issues were discussed:  Chest pain (786.50) (R07.9) ???No active chest pain. DM2 (diabetes mellitus, type 2) (250.00) (E11.9) ???Followed by Endocrine; seems to be improving. continue to monitor with Endocrine. Will     check A1c. Hyperlipidemia (272.4) (E78.5) ???Continue with high-dose statin therapy. Hypertension (401.9) (I10) ???Long h/o HTN, continue to monitor at home. BP improved, but remains elevated today.     States he is compliant at home. He has stopped taking his "vitamin" from [place]. At     last visit, I increased his Entesto and bblocker rx. Today, will add amlodipine. STEMI (ST elevation myocardial infarction) (410.90) (I21.3) ???Inferolateral STEMI earlier this year. No chest pain; mild VILLASENOR. States he iscompliant with     medications; I again asked him to bring all medications to clinic next time to review. No     heart failure on exam. Medication should continue with aspirin + Brillinta (for one year) in     addition to metoprolol and Entresto. Today, adding amlodipine 5 mg today for HTN. Will     check labs. No alcohol use ???H/o alcohol use disorder; states he is not drinking now. We discussed the importance of     staying sober; he understands.  Labs July 2023: CMP: K 3.4  TSH normal A1c 10.5% tchol 131, trig 92, HDL 59, LDL 55 mg/dL  Since last visit,   EKG today performed to f/u on CAD: Sinus rhythm  RSR' V1V2 Early transition Nonspecific T wave abnormalites ABNORMAL ECG

## 2023-09-11 ENCOUNTER — APPOINTMENT (OUTPATIENT)
Dept: CARDIOLOGY | Facility: CLINIC | Age: 58
End: 2023-09-11
Payer: MEDICAID

## 2023-09-18 ENCOUNTER — APPOINTMENT (OUTPATIENT)
Dept: CARDIOLOGY | Facility: CLINIC | Age: 58
End: 2023-09-18
Payer: MEDICAID

## 2023-09-18 ENCOUNTER — NON-APPOINTMENT (OUTPATIENT)
Age: 58
End: 2023-09-18

## 2023-09-18 VITALS
WEIGHT: 159 LBS | HEART RATE: 69 BPM | BODY MASS INDEX: 25.55 KG/M2 | SYSTOLIC BLOOD PRESSURE: 131 MMHG | HEIGHT: 66 IN | DIASTOLIC BLOOD PRESSURE: 90 MMHG | OXYGEN SATURATION: 100 % | RESPIRATION RATE: 16 BRPM

## 2023-09-18 PROCEDURE — 99214 OFFICE O/P EST MOD 30 MIN: CPT

## 2023-09-18 PROCEDURE — G0404: CPT

## 2023-09-18 RX ORDER — ASPIRIN 81 MG/1
81 TABLET, DELAYED RELEASE ORAL
Qty: 90 | Refills: 1 | Status: DISCONTINUED | COMMUNITY
Start: 2022-08-29 | End: 2023-09-18

## 2023-12-06 ENCOUNTER — APPOINTMENT (OUTPATIENT)
Dept: ENDOCRINOLOGY | Facility: CLINIC | Age: 58
End: 2023-12-06
Payer: MEDICAID

## 2023-12-06 VITALS
DIASTOLIC BLOOD PRESSURE: 93 MMHG | HEIGHT: 66 IN | HEART RATE: 65 BPM | BODY MASS INDEX: 26.03 KG/M2 | OXYGEN SATURATION: 98 % | TEMPERATURE: 98.4 F | WEIGHT: 162 LBS | SYSTOLIC BLOOD PRESSURE: 142 MMHG

## 2023-12-06 LAB
GLUCOSE BLDC GLUCOMTR-MCNC: 291
HBA1C MFR BLD HPLC: 9.7

## 2023-12-06 PROCEDURE — 82962 GLUCOSE BLOOD TEST: CPT

## 2023-12-06 PROCEDURE — 83036 HEMOGLOBIN GLYCOSYLATED A1C: CPT | Mod: QW

## 2023-12-06 PROCEDURE — 99214 OFFICE O/P EST MOD 30 MIN: CPT | Mod: 25

## 2023-12-11 ENCOUNTER — NON-APPOINTMENT (OUTPATIENT)
Age: 58
End: 2023-12-11

## 2023-12-11 ENCOUNTER — APPOINTMENT (OUTPATIENT)
Dept: CARDIOLOGY | Facility: CLINIC | Age: 58
End: 2023-12-11
Payer: MEDICAID

## 2023-12-11 VITALS
HEART RATE: 88 BPM | OXYGEN SATURATION: 97 % | SYSTOLIC BLOOD PRESSURE: 169 MMHG | BODY MASS INDEX: 26.03 KG/M2 | DIASTOLIC BLOOD PRESSURE: 114 MMHG | WEIGHT: 162 LBS | HEIGHT: 66 IN

## 2023-12-11 PROCEDURE — G0404: CPT

## 2023-12-11 PROCEDURE — 99214 OFFICE O/P EST MOD 30 MIN: CPT

## 2023-12-11 RX ORDER — AMLODIPINE BESYLATE 10 MG/1
10 TABLET ORAL DAILY
Qty: 90 | Refills: 3 | Status: ACTIVE | COMMUNITY
Start: 2023-07-24 | End: 1900-01-01

## 2023-12-26 RX ORDER — BLOOD-GLUCOSE SENSOR
EACH MISCELLANEOUS
Qty: 9 | Refills: 3 | Status: ACTIVE | COMMUNITY
Start: 2023-12-26 | End: 1900-01-01

## 2023-12-26 RX ORDER — BLOOD-GLUCOSE,RECEIVER,CONT
EACH MISCELLANEOUS
Qty: 1 | Refills: 0 | Status: ACTIVE | COMMUNITY
Start: 2023-12-26 | End: 1900-01-01

## 2024-02-08 RX ORDER — ISOPROPYL ALCOHOL 70 ML/100ML
SWAB TOPICAL
Qty: 360 | Refills: 1 | Status: ACTIVE | COMMUNITY
Start: 2024-02-08 | End: 1900-01-01

## 2024-02-08 NOTE — H&P ADULT - NS ATTEST RISK GEN_ALL_CORE
Caller: [facility] [id number] [place], [place] [address] [facility] [phone] [facility] [phone] FX    Relationship: Pharmacy    Best call back number:[phone]      Requested Prescriptions:   Requested Prescriptions     Pending Prescriptions Disp Refills    Fluticasone-Salmeterol (ADVAIR/WIXELA) 250-50 MCG/ACT DISKUS 60 each 11     Sig: Inhale 1 puff 2 (Two) Times a Day.        Pharmacy where request should be sent: [facility] [id number] [place], [place] - [address] [facility] [phone] [facility] [phone] FX     Last office visit with prescribing clinician: Visit date not found   Last telemedicine visit with prescribing clinician: Visit date not found   Next office visit with prescribing clinician: 6/21/2024     Additional details provided by patient: LORRAINE WITH [facility] REQUESTS REFILL.    Does the patient have less than a 3 day supply:  [x] Yes  [] No    Would you like a call back once the refill request has been completed: [] Yes [] No    If the office needs to give you a call back, can they leave a voicemail: [] Yes [] No    PLEASE ADVISE.    Colin Al Rep   02/08/24 09:14 EST         
Risk Statement (NON-critical care)

## 2024-02-09 NOTE — HISTORY OF PRESENT ILLNESS
[FreeTextEntry1] : This is an outpatient f/u visit for Mr. Hernandez for CAD.  59 yo man H/o alcohol use disorder w/ prior hospitalization for alcohol withdrawal; stopped drinking since MI; no recent withdrawal (recently, had a few drinks during a celebration; last saturday; no alcohol since then) TIA  HTN Poorly controlled diabetes 2022: Inferolateral STEMI s/p BECKY to pCX 100% occlusion, pLAD 30%, dOM 100% EF 30-35%, LVEDP 36 s/p lasix 40mg IVP x1. Had IABP. Course was c/b fevers of unknown origin. Rx'd with antibiotics.  Labs Dec 2022: A1c 10.2% K 5.4, BUN 32, creat 1.27; LFT's normal tchol 222, trig 161, HDL 78,  mg/dL  Labs June 2023: A1c 9.7%; working with Endocrine  Labs July 2023: CMP: K 3.4  TSH normal A1c 10.5% tchol 131, trig 92, HDL 59, LDL 55 mg/dL  Echo June 2023: LVEF 40%, anterolateral, inferolateral basal to mid inferior wall hypokinesis; no thrombus Mild diastolic dysfunction, stage I  At last visit in Dec 2023, the following issues were discussed:  Chest pain (786.50) (R07.9) No active chest pain. Doing well. DM2 (diabetes mellitus, type 2) (250.00) (E11.9) Followed by Endocrine; recently started on Jardiance, which is a great choice given his     CAD/heart failure. Hyperlipidemia (272.4) (E78.5) Continue with high-dose statin therapy. Last lipids in good range. Continue with heart     smart choices. Hypertension (401.9) (I10) Long h/o HTN, continues to monitor at home and has noted higher levels, including     today's clinic data. Will increase amlodipine dose, and monitor. If no improvement, will     consider adding diuretic at next visit in 2 mths. Low salt diet; continued home monitoring. STEMI (ST elevation myocardial infarction) (410.90) (I21.3) Inferolateral STEMI March 2022; LVEF 40%. No recurrent chest pain. Trying to control     modifiable risk factors. No heart failure on exam. Medication should continue Brillinta     (alone; have stopped the aspirin) in addition to metoprolol, Entresto, and amlodipine.     States labs to be checked by Endocrine in next few weeks.  Since last visit,     EKG today performed to f/u on CAD: sinus rhythm, LAD, old IMI, cannot exclude anterior MI.

## 2024-02-12 ENCOUNTER — APPOINTMENT (OUTPATIENT)
Dept: CARDIOLOGY | Facility: CLINIC | Age: 59
End: 2024-02-12

## 2024-02-29 NOTE — ED PROVIDER NOTE - SKIN LATERALITY #1
<100 mg/dL Final     VLDL   Date Value Ref Range Status   11/29/2023 44 mg/dL Final     Comment:     No normal range established.     Sodium   Date Value Ref Range Status   07/10/2023 142 132 - 146 mmol/L Final     Potassium   Date Value Ref Range Status   07/10/2023 4.6 3.5 - 5.0 mmol/L Final     Chloride   Date Value Ref Range Status   07/10/2023 104 98 - 107 mmol/L Final     CO2   Date Value Ref Range Status   07/10/2023 20 (L) 22 - 29 mmol/L Final     BUN   Date Value Ref Range Status   07/10/2023 13 6 - 23 mg/dL Final     Creatinine   Date Value Ref Range Status   07/10/2023 0.9 0.7 - 1.2 mg/dL Final     Glucose   Date Value Ref Range Status   07/10/2023 102 (H) 74 - 99 mg/dL Final     Calcium   Date Value Ref Range Status   07/10/2023 10.0 8.6 - 10.2 mg/dL Final     Total Protein   Date Value Ref Range Status   07/10/2023 8.1 6.4 - 8.3 g/dL Final     Albumin   Date Value Ref Range Status   07/10/2023 4.8 3.5 - 5.2 g/dL Final     Total Bilirubin   Date Value Ref Range Status   07/10/2023 0.7 0.0 - 1.2 mg/dL Final     Alkaline Phosphatase   Date Value Ref Range Status   07/10/2023 83 40 - 129 U/L Final     AST   Date Value Ref Range Status   07/10/2023 17 0 - 39 U/L Final     ALT   Date Value Ref Range Status   07/10/2023 27 0 - 40 U/L Final     Est, Glom Filt Rate   Date Value Ref Range Status   07/10/2023 >60 >=60 mL/min/1.73 Final     Comment:     Pediatric calculator link  https://www.kidney.org/professionals/kdoqi/gfr_calculatorped  Effective Oct 3, 2022  These results are not intended for use in patients  <18 years of age.  eGFR results are calculated without  a race factor using the 2021 CKD-EPI equation.  Careful  clinical correlation is recommended, particularly when  comparing to results calculated using previous equations.  The CKD-EPI equation is less accurate in patients with  extremes of muscle mass, extra-renal metabolism of  creatinine, excessive creatinine ingestion, or following  therapy that  right

## 2024-03-22 RX ORDER — TICAGRELOR 90 MG/1
90 TABLET ORAL TWICE DAILY
Qty: 180 | Refills: 1 | Status: ACTIVE | COMMUNITY
Start: 2022-08-29 | End: 1900-01-01

## 2024-04-02 RX ORDER — PEN NEEDLE, DIABETIC 29 G X1/2"
32G X 4 MM NEEDLE, DISPOSABLE MISCELLANEOUS
Qty: 360 | Refills: 2 | Status: ACTIVE | COMMUNITY
Start: 2023-05-30 | End: 1900-01-01

## 2024-04-15 ENCOUNTER — APPOINTMENT (OUTPATIENT)
Dept: CARDIOLOGY | Facility: CLINIC | Age: 59
End: 2024-04-15
Payer: MEDICAID

## 2024-04-15 ENCOUNTER — NON-APPOINTMENT (OUTPATIENT)
Age: 59
End: 2024-04-15

## 2024-04-15 VITALS
WEIGHT: 162 LBS | SYSTOLIC BLOOD PRESSURE: 118 MMHG | HEART RATE: 73 BPM | BODY MASS INDEX: 26.03 KG/M2 | HEIGHT: 66 IN | OXYGEN SATURATION: 98 % | DIASTOLIC BLOOD PRESSURE: 78 MMHG

## 2024-04-15 DIAGNOSIS — R07.9 CHEST PAIN, UNSPECIFIED: ICD-10-CM

## 2024-04-15 DIAGNOSIS — I21.3 ST ELEVATION (STEMI) MYOCARDIAL INFARCTION OF UNSPECIFIED SITE: ICD-10-CM

## 2024-04-15 PROCEDURE — G0404: CPT

## 2024-04-15 PROCEDURE — G2211 COMPLEX E/M VISIT ADD ON: CPT | Mod: NC,1L

## 2024-04-15 PROCEDURE — 93000 ELECTROCARDIOGRAM COMPLETE: CPT

## 2024-04-15 PROCEDURE — 99214 OFFICE O/P EST MOD 30 MIN: CPT

## 2024-04-15 RX ORDER — ATORVASTATIN CALCIUM 80 MG/1
80 TABLET, FILM COATED ORAL
Qty: 90 | Refills: 3 | Status: ACTIVE | COMMUNITY
Start: 2022-08-29 | End: 1900-01-01

## 2024-04-15 NOTE — REVIEW OF SYSTEMS
[Fever] : no fever [Weight Gain (___ Lbs)] : no recent weight gain [Chills] : no chills [SOB] : no shortness of breath [Dyspnea on exertion] : not dyspnea during exertion [Chest Discomfort] : no chest discomfort [Lower Ext Edema] : no extremity edema [Leg Claudication] : no intermittent leg claudication [Palpitations] : no palpitations [Orthopnea] : no orthopnea [PND] : no PND [Syncope] : no syncope [Negative] : Integumentary

## 2024-04-15 NOTE — HISTORY OF PRESENT ILLNESS
[FreeTextEntry1] : This is an outpatient f/u visit for Mr. Hernandez for CAD.  57 yo man H/o alcohol use disorder w/ prior hospitalization for alcohol withdrawal; stopped drinking since MI; no recent withdrawal (recently, had a few drinks during a celebration; last saturday; no alcohol since then) TIA  HTN Poorly controlled diabetes 2022: Inferolateral STEMI s/p BECKY to pCX 100% occlusion, pLAD 30%, dOM 100% EF 30-35%, LVEDP 36 s/p lasix 40mg IVP x1. Had IABP. Course was c/b fevers of unknown origin. Rx'd with antibiotics.  Labs Dec 2022: A1c 10.2% K 5.4, BUN 32, creat 1.27; LFT's normal tchol 222, trig 161, HDL 78,  mg/dL  Labs June 2023: A1c 9.7%; working with Endocrine  Labs July 2023: CMP: K 3.4  TSH normal A1c 10.5% tchol 131, trig 92, HDL 59, LDL 55 mg/dL  Echo June 2023: LVEF 40%, anterolateral, inferolateral basal to mid inferior wall hypokinesis; no thrombus Mild diastolic dysfunction, stage I  At last visit in Dec 2023, the following issues were discussed:  Chest pain (786.50) (R07.9) No active chest pain. Doing well. DM2 (diabetes mellitus, type 2) (250.00) (E11.9) Followed by Endocrine; recently started on Jardiance, which is a great choice given his     CAD/heart failure. Hyperlipidemia (272.4) (E78.5) Continue with high-dose statin therapy. Last lipids in good range. Continue with heart     smart choices. Hypertension (401.9) (I10) Long h/o HTN, continues to monitor at home and has noted higher levels, including     today's clinic data. Will increase amlodipine dose, and monitor. If no improvement, will     consider adding diuretic at next visit in 2 mths. Low salt diet; continued home monitoring. STEMI (ST elevation myocardial infarction) (410.90) (I21.3) Inferolateral STEMI March 2022; LVEF 40%. No recurrent chest pain. Trying to control     modifiable risk factors. No heart failure on exam. Medication should continue Brillinta     (alone; have stopped the aspirin) in addition to metoprolol, Entresto, and amlodipine.     States labs to be checked by Endocrine in next few weeks.  Since last visit, no change in medications. BS's at home <200 mg/dL. Compliant with medications. No alcohol. No smoking. Working full time.  Overall, feels pretty well. At times, some lightheadedness, perhaps related to his BS. Bp's have been well-controlled. No syncope. No chest pain.   EKG today performed to f/u on CAD: Sinus Rhythm  RSR' V1 Nonspecific T-abnormality. ABNORMAL

## 2024-04-16 LAB
ALBUMIN SERPL ELPH-MCNC: 4.3 G/DL
ALP BLD-CCNC: 87 U/L
ALT SERPL-CCNC: 33 U/L
ANION GAP SERPL CALC-SCNC: 19 MMOL/L
AST SERPL-CCNC: 21 U/L
BILIRUB SERPL-MCNC: 0.2 MG/DL
BUN SERPL-MCNC: 24 MG/DL
CALCIUM SERPL-MCNC: 8.9 MG/DL
CHLORIDE SERPL-SCNC: 98 MMOL/L
CHOLEST SERPL-MCNC: 121 MG/DL
CO2 SERPL-SCNC: 21 MMOL/L
CREAT SERPL-MCNC: 1.31 MG/DL
EGFR: 64 ML/MIN/1.73M2
ESTIMATED AVERAGE GLUCOSE: 220 MG/DL
GLUCOSE SERPL-MCNC: 124 MG/DL
HBA1C MFR BLD HPLC: 9.3 %
HCT VFR BLD CALC: 41.1 %
HDLC SERPL-MCNC: 54 MG/DL
HGB BLD-MCNC: 14.2 G/DL
LDLC SERPL CALC-MCNC: 50 MG/DL
MCHC RBC-ENTMCNC: 30.8 PG
MCHC RBC-ENTMCNC: 34.5 GM/DL
MCV RBC AUTO: 89.2 FL
NONHDLC SERPL-MCNC: 67 MG/DL
PLATELET # BLD AUTO: 312 K/UL
POTASSIUM SERPL-SCNC: 4.4 MMOL/L
PROT SERPL-MCNC: 6.8 G/DL
RBC # BLD: 4.61 M/UL
RBC # FLD: 12.8 %
SODIUM SERPL-SCNC: 139 MMOL/L
TRIGL SERPL-MCNC: 92 MG/DL
WBC # FLD AUTO: 8.07 K/UL

## 2024-04-16 NOTE — ED PROVIDER NOTE - NEUROLOGICAL, MLM
Pt mother given discharge instructions, patient education, prescriptions and follow up information. Pt mother verbalizes understanding. All questions answered. Pt discharged to home in private vehicle, ambulatory. Pt A&Ox4, RA, pain controlled.    Pt mother received AVS forms, no further questions or concerns at this time.    
Alert and oriented, no focal deficits, no motor or sensory deficits.

## 2024-04-25 NOTE — ED ADULT TRIAGE NOTE - HEIGHT IN FEET
5

## 2024-05-10 ENCOUNTER — APPOINTMENT (OUTPATIENT)
Dept: ENDOCRINOLOGY | Facility: CLINIC | Age: 59
End: 2024-05-10
Payer: MEDICAID

## 2024-05-10 VITALS
HEART RATE: 81 BPM | HEIGHT: 66 IN | DIASTOLIC BLOOD PRESSURE: 83 MMHG | TEMPERATURE: 98.3 F | WEIGHT: 156 LBS | BODY MASS INDEX: 25.07 KG/M2 | OXYGEN SATURATION: 98 % | SYSTOLIC BLOOD PRESSURE: 123 MMHG

## 2024-05-10 DIAGNOSIS — I10 ESSENTIAL (PRIMARY) HYPERTENSION: ICD-10-CM

## 2024-05-10 DIAGNOSIS — E78.5 HYPERLIPIDEMIA, UNSPECIFIED: ICD-10-CM

## 2024-05-10 PROCEDURE — 99214 OFFICE O/P EST MOD 30 MIN: CPT

## 2024-05-10 PROCEDURE — 82962 GLUCOSE BLOOD TEST: CPT

## 2024-05-10 RX ORDER — EMPAGLIFLOZIN 10 MG/1
10 TABLET, FILM COATED ORAL DAILY
Qty: 1 | Refills: 2 | Status: ACTIVE | COMMUNITY
Start: 2023-12-06 | End: 1900-01-01

## 2024-05-13 ENCOUNTER — APPOINTMENT (OUTPATIENT)
Dept: ENDOCRINOLOGY | Facility: CLINIC | Age: 59
End: 2024-05-13
Payer: MEDICAID

## 2024-05-13 PROBLEM — E78.5 HYPERLIPIDEMIA: Status: ACTIVE | Noted: 2023-06-26

## 2024-05-13 PROBLEM — I10 HYPERTENSION: Status: ACTIVE | Noted: 2022-03-11

## 2024-05-13 LAB — GLUCOSE BLDC GLUCOMTR-MCNC: 85

## 2024-05-13 PROCEDURE — 99211 OFF/OP EST MAY X REQ PHY/QHP: CPT

## 2024-05-13 NOTE — HISTORY OF PRESENT ILLNESS
[FreeTextEntry1] : #Diabetes Mellitus Type 2      Diagnosis- 12 years ago  Current regimen: Basaglar 30 units in the morning (takes it in M-F)+ Admleog 10 units with meals + Jardiance 10mg  Signs/symptoms:  Last HgbA1c: 9.3% Home blood sugars:  states sugars are always in the 90s when he checks it  Patient is taking insulin 3-4 times a day that requires blood sugar checks about 4 times a day for self titration of his insulin.  History of CAD: 1 year ago  History of CVA: mini stroke - 3 years ago  FH of diabetes: Mother with T2DM   Diet:  Breakfast: oatmeal (4 in the morning) - takes at 4am  Breakfast: Turkey cao egg and cheese - 10 units  Lunch: Skips most times- will skip novolog  Dinner: chinese, bbq, rice/vegetables, chicken- 10 units  Drinks: no juice or soda   Exercise:  works   eGFR: 87   AST: 22 ALT: 37  Last eye exam: due  Evidence of retinopathy? ?possible retinopathy?   Last foot exam: due  Any issues? denies   Social History:  Alcohol: used to drink previously heavily not anymore  Tobacco: Denies Work: driving  #HLD - Atorvastatin 80mg daily   #HTN - amlodipine 5mg daily, entresto , metoprolol

## 2024-05-13 NOTE — ASSESSMENT
[FreeTextEntry1] :  #Type 2 DM - Patient is currently on Basaglar 32 units daily + Admelog 10 units TID with meals  - There is a disconnect between patient's home blood sugars readings and patient's A1c. Suspect sugars are trending up when patient isn't checking sugars  - A1c is uncontrolled at 9.3% Plan: - Patient to come back on monday and get teaching done by our nurse to place sensor  - patient to then return in 1 month to download sensor data with CDE  - Unable to make insulin adjustments at this time without further data  - Continue with current regimen for now   Side effects of SGLT2 inhibitors discussed in detail including genitourinary infections including vaginal infections, increased urination, risk for dehydration and hypotension.  Recommended to increase patient's fluid intake to avoid dehydration.  Rare side effects of normoglycemic DKA were also discussed.  Patient counseled extensively about the complications of diabetes including but not limited to nephropathy, neuropathy, and retinopathy. We discussed the importance of annual foot and optho exams. Explained that ideally blood sugars in the morning prior to breakfast should be between 80 and 130. Blood sugars should be checked 2 hours after eating and should be <180. If blood sugar is <70, patient should treat the blood sugar FIRST and then contact provider. Advised patient to let us know if BG persistently <70 or >200  #HTN - continue with current regimen with Amlodipine, entresto, metoprolol  #HLD - continue with atorvastatin   
normal...

## 2024-05-15 RX ORDER — INSULIN GLARGINE 100 [IU]/ML
100 INJECTION, SOLUTION SUBCUTANEOUS
Qty: 2 | Refills: 1 | Status: ACTIVE | COMMUNITY
Start: 2022-12-21 | End: 1900-01-01

## 2024-05-15 RX ORDER — INSULIN LISPRO 100 U/ML
100 INJECTION, SOLUTION SUBCUTANEOUS
Qty: 2 | Refills: 1 | Status: ACTIVE | COMMUNITY
Start: 2022-12-21 | End: 1900-01-01

## 2024-06-10 RX ORDER — SACUBITRIL AND VALSARTAN 97; 103 MG/1; MG/1
97-103 TABLET, FILM COATED ORAL TWICE DAILY
Qty: 180 | Refills: 1 | Status: ACTIVE | COMMUNITY
Start: 2022-08-29 | End: 1900-01-01

## 2024-06-11 ENCOUNTER — APPOINTMENT (OUTPATIENT)
Dept: ENDOCRINOLOGY | Facility: CLINIC | Age: 59
End: 2024-06-11
Payer: MEDICAID

## 2024-06-11 DIAGNOSIS — E11.9 TYPE 2 DIABETES MELLITUS W/OUT COMPLICATIONS: ICD-10-CM

## 2024-06-11 PROCEDURE — 95251 CONT GLUC MNTR ANALYSIS I&R: CPT

## 2024-06-11 PROCEDURE — G0108 DIAB MANAGE TRN  PER INDIV: CPT

## 2024-06-12 PROBLEM — E11.9 DM2 (DIABETES MELLITUS, TYPE 2): Status: ACTIVE | Noted: 2022-03-11

## 2024-08-30 ENCOUNTER — APPOINTMENT (OUTPATIENT)
Dept: ENDOCRINOLOGY | Facility: CLINIC | Age: 59
End: 2024-08-30
Payer: MEDICAID

## 2024-08-30 ENCOUNTER — APPOINTMENT (OUTPATIENT)
Dept: INTERNAL MEDICINE | Facility: CLINIC | Age: 59
End: 2024-08-30
Payer: MEDICAID

## 2024-08-30 VITALS
WEIGHT: 158 LBS | SYSTOLIC BLOOD PRESSURE: 126 MMHG | HEIGHT: 67 IN | BODY MASS INDEX: 24.8 KG/M2 | DIASTOLIC BLOOD PRESSURE: 81 MMHG | OXYGEN SATURATION: 99 % | HEART RATE: 62 BPM

## 2024-08-30 DIAGNOSIS — Z11.4 ENCOUNTER FOR SCREENING FOR HUMAN IMMUNODEFICIENCY VIRUS [HIV]: ICD-10-CM

## 2024-08-30 DIAGNOSIS — Z00.00 ENCOUNTER FOR GENERAL ADULT MEDICAL EXAMINATION W/OUT ABNORMAL FINDINGS: ICD-10-CM

## 2024-08-30 DIAGNOSIS — E11.9 TYPE 2 DIABETES MELLITUS W/OUT COMPLICATIONS: ICD-10-CM

## 2024-08-30 DIAGNOSIS — F10.10 ALCOHOL ABUSE, UNCOMPLICATED: ICD-10-CM

## 2024-08-30 DIAGNOSIS — I21.3 ST ELEVATION (STEMI) MYOCARDIAL INFARCTION OF UNSPECIFIED SITE: ICD-10-CM

## 2024-08-30 DIAGNOSIS — Z12.11 ENCOUNTER FOR SCREENING FOR MALIGNANT NEOPLASM OF COLON: ICD-10-CM

## 2024-08-30 DIAGNOSIS — Z86.73 PERSONAL HISTORY OF TRANSIENT ISCHEMIC ATTACK (TIA), AND CEREBRAL INFARCTION W/OUT RESIDUAL DEFICITS: ICD-10-CM

## 2024-08-30 DIAGNOSIS — E78.5 HYPERLIPIDEMIA, UNSPECIFIED: ICD-10-CM

## 2024-08-30 DIAGNOSIS — B35.3 TINEA PEDIS: ICD-10-CM

## 2024-08-30 DIAGNOSIS — Z86.79 PERSONAL HISTORY OF OTHER DISEASES OF THE CIRCULATORY SYSTEM: ICD-10-CM

## 2024-08-30 DIAGNOSIS — I10 ESSENTIAL (PRIMARY) HYPERTENSION: ICD-10-CM

## 2024-08-30 DIAGNOSIS — Z11.59 ENCOUNTER FOR SCREENING FOR OTHER VIRAL DISEASES: ICD-10-CM

## 2024-08-30 PROCEDURE — G0108 DIAB MANAGE TRN  PER INDIV: CPT

## 2024-08-30 PROCEDURE — 36415 COLL VENOUS BLD VENIPUNCTURE: CPT

## 2024-08-30 PROCEDURE — 99396 PREV VISIT EST AGE 40-64: CPT

## 2024-08-30 PROCEDURE — 95251 CONT GLUC MNTR ANALYSIS I&R: CPT

## 2024-08-30 RX ORDER — TERBINAFINE HCL 1 %
1 CREAM (GRAM) TOPICAL 3 TIMES DAILY
Qty: 1 | Refills: 2 | Status: ACTIVE | COMMUNITY
Start: 2024-08-30 | End: 1900-01-01

## 2024-08-30 NOTE — PLAN
[FreeTextEntry1] : non fasting labs - Blood was collected in the office. excessive alcohol use- rec AA. stop alcohol use

## 2024-08-30 NOTE — PHYSICAL EXAM
[No Acute Distress] : no acute distress [Well Nourished] : well nourished [Well Developed] : well developed [Well-Appearing] : well-appearing [Normal Sclera/Conjunctiva] : normal sclera/conjunctiva [PERRL] : pupils equal round and reactive to light [Normal Outer Ear/Nose] : the outer ears and nose were normal in appearance [Normal Oropharynx] : the oropharynx was normal [No Lymphadenopathy] : no lymphadenopathy [Supple] : supple [Thyroid Normal, No Nodules] : the thyroid was normal and there were no nodules present [No Respiratory Distress] : no respiratory distress  [No Accessory Muscle Use] : no accessory muscle use [Clear to Auscultation] : lungs were clear to auscultation bilaterally [Normal Rate] : normal rate  [Regular Rhythm] : with a regular rhythm [Normal S1, S2] : normal S1 and S2 [No Murmur] : no murmur heard [No Carotid Bruits] : no carotid bruits [No Edema] : there was no peripheral edema [Soft] : abdomen soft [Non Tender] : non-tender [Non-distended] : non-distended [No Masses] : no abdominal mass palpated [Normal Bowel Sounds] : normal bowel sounds [Normal Supraclavicular Nodes] : no supraclavicular lymphadenopathy [Normal Axillary Nodes] : no axillary lymphadenopathy [Normal Posterior Cervical Nodes] : no posterior cervical lymphadenopathy [Normal Anterior Cervical Nodes] : no anterior cervical lymphadenopathy [Normal Inguinal Nodes] : no inguinal lymphadenopathy [Grossly Normal Strength/Tone] : grossly normal strength/tone [No Focal Deficits] : no focal deficits [Normal Gait] : normal gait [Normal Affect] : the affect was normal [Normal Insight/Judgement] : insight and judgment were intact [Right Foot Was Examined] : Right foot ~C was examined [Left Foot Was Examined] : left foot ~C was examined [de-identified] : scaly rash

## 2024-08-30 NOTE — HEALTH RISK ASSESSMENT
[Yes] : Yes [2 - 3 times a week (3 pts)] : 2 - 3  times a week (3 points) [3 or 4 (1 pt)] : 3 or 4  (1 point) [Never (0 pts)] : Never (0 points) [No] : In the past 12 months have you used drugs other than those required for medical reasons? No [0] : 1) Little interest or pleasure doing things: Not at all (0) [1] : 2) Feeling down, depressed, or hopeless for several days (1) [PHQ-2 Negative - No further assessment needed] : PHQ-2 Negative - No further assessment needed [de-identified] : drinking 3 beers/d- 3 days /wk [Audit-CScore] : 4 [LHY1Darzx] : 1 [HIV Test offered] : HIV Test offered [Hepatitis C test offered] : Hepatitis C test offered [ColonoscopyComments] : never had colonoscopy [de-identified] : seen ophthalmology - 1/2024 [Designated Healthcare Proxy] : Designated healthcare proxy [Name: ___] : Health Care Proxy's Name: [unfilled]  [Relationship: ___] : Relationship: [unfilled] [AdvancecareDate] : 08/24 [Never] : Never [NO] : No

## 2024-08-30 NOTE — HISTORY OF PRESENT ILLNESS
[FreeTextEntry1] : CPE [de-identified] : vaccine- got  tdap- 2023 when he had a cut, rec prevnar 20 , shingrix, COVID, flu vac diet- healthy diet reviewed.   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 CAD- inferolateral STEM- 3/2022-reviewed 4/15/24 Cardio notes.  mild dyspnea when climbing stairs- for several yrs.  reviewed 7/26/23 ECHO- mod syst dysfunction, hypokinesis- anterolat, inferolat and basal to mid inferior wall, diastolic dysf.  EF 40 % had "mild stroke w bleed"- 5 yrs ago pt states he was a heavy drinker until 1 yr ago.  His wife passed away of COVID 4 yr ago  and then started drinking more heavily

## 2024-08-30 NOTE — COUNSELING
[AUDIT-C Screening administered and reviewed] : AUDIT-C Screening administered and reviewed [Hazards of at-risk alcohol use discussed] : Hazards of at-risk alcohol use discussed [Strategies to reduce or eliminate alcohol use discussed] : Strategies to reduce or eliminate alcohol use discussed [Support options provided] : Support options provided [Quit Drinking] : Quit Drinking [Participate in Treatment Program] : Participate in treatment program

## 2024-09-03 ENCOUNTER — NON-APPOINTMENT (OUTPATIENT)
Age: 59
End: 2024-09-03

## 2024-09-03 LAB
CREAT SPEC-SCNC: 60 MG/DL
MICROALBUMIN 24H UR DL<=1MG/L-MCNC: 11 MG/DL
MICROALBUMIN/CREAT 24H UR-RTO: 183 MG/G

## 2024-09-17 NOTE — HISTORY OF PRESENT ILLNESS
[FreeTextEntry1] : This is an outpatient f/u visit for Mr. Hernandez for CAD.  60 yo man H/o alcohol use disorder w/ prior hospitalization for alcohol withdrawal; stopped drinking since MI; no recent withdrawal (recently, had a few drinks during a celebration; last saturday; no alcohol since then) TIA  HTN Poorly controlled diabetes 2022: Inferolateral STEMI s/p BECKY to pCX 100% occlusion, pLAD 30%, dOM 100% EF 30-35%, LVEDP 36 s/p lasix 40mg IVP x1. Had IABP. Course was c/b fevers of unknown origin. Rx'd with antibiotics.  Labs Dec 2022: A1c 10.2% K 5.4, BUN 32, creat 1.27; LFT's normal tchol 222, trig 161, HDL 78,  mg/dL  Labs June 2023: A1c 9.7%; working with Endocrine  Labs July 2023: CMP: K 3.4  TSH normal A1c 10.5% tchol 131, trig 92, HDL 59, LDL 55 mg/dL  Echo June 2023: LVEF 40%, anterolateral, inferolateral basal to mid inferior wall hypokinesis; no thrombus Mild diastolic dysfunction, stage I  At last visit in April 2024, the following issues were discussed:  Chest pain (786.50) (R07.9) No active chest pain. Doing well. DM2 (diabetes mellitus, type 2) (250.00) (E11.9) Followed by Endocrine; on Jardiance and insulin. Hyperlipidemia (272.4) (E78.5) Continue with high-dose statin therapy. Check lipids. Continue with heart smart choices. Hypertension (401.9) (I10) Long h/o HTN. BP in better range. Continue present meds. STEMI (ST elevation myocardial infarction) (410.90) (I21.3) Inferolateral STEMI March 2022; LVEF 40%. No recurrent chest pain. Trying to control     modifiable risk factors. No heart failure on exam. Medication should continue Brillinta     (alone; have stopped the aspirin) in addition to metoprolol, Entresto, and amlodipine.     Labs to be checked by today; needs f/u with Endocrine.  Labs April 2024: A1c 9.3% tchol 121, trig 92, HDL 54, LDL 50 mg/dL K 4.4, creat 1.31, BUN 24; LFT's normal  Since last visit,      EKG today performed to f/u on CAD: Sinus Rhythm  RSR' V1 Nonspecific T-abnormality. ABNORMAL

## 2024-09-23 ENCOUNTER — APPOINTMENT (OUTPATIENT)
Dept: CARDIOLOGY | Facility: CLINIC | Age: 59
End: 2024-09-23

## 2024-09-23 DIAGNOSIS — R07.9 CHEST PAIN, UNSPECIFIED: ICD-10-CM

## 2024-09-23 DIAGNOSIS — I21.3 ST ELEVATION (STEMI) MYOCARDIAL INFARCTION OF UNSPECIFIED SITE: ICD-10-CM

## 2024-09-23 DIAGNOSIS — I10 ESSENTIAL (PRIMARY) HYPERTENSION: ICD-10-CM

## 2024-09-23 DIAGNOSIS — E78.5 HYPERLIPIDEMIA, UNSPECIFIED: ICD-10-CM

## 2024-10-03 ENCOUNTER — APPOINTMENT (OUTPATIENT)
Dept: ENDOCRINOLOGY | Facility: CLINIC | Age: 59
End: 2024-10-03
Payer: MEDICAID

## 2024-10-03 DIAGNOSIS — E11.9 TYPE 2 DIABETES MELLITUS W/OUT COMPLICATIONS: ICD-10-CM

## 2024-10-03 PROCEDURE — 95251 CONT GLUC MNTR ANALYSIS I&R: CPT

## 2024-10-03 PROCEDURE — 83036 HEMOGLOBIN GLYCOSYLATED A1C: CPT | Mod: QW

## 2024-10-03 PROCEDURE — G0108 DIAB MANAGE TRN  PER INDIV: CPT

## 2024-10-04 LAB — HBA1C MFR BLD HPLC: 8.8

## 2024-10-21 ENCOUNTER — NON-APPOINTMENT (OUTPATIENT)
Age: 59
End: 2024-10-21

## 2024-10-21 ENCOUNTER — APPOINTMENT (OUTPATIENT)
Dept: CARDIOLOGY | Facility: CLINIC | Age: 59
End: 2024-10-21
Payer: MEDICAID

## 2024-10-21 VITALS
DIASTOLIC BLOOD PRESSURE: 93 MMHG | HEART RATE: 69 BPM | OXYGEN SATURATION: 99 % | BODY MASS INDEX: 24.48 KG/M2 | HEIGHT: 67 IN | WEIGHT: 156 LBS | SYSTOLIC BLOOD PRESSURE: 151 MMHG

## 2024-10-21 VITALS — DIASTOLIC BLOOD PRESSURE: 80 MMHG | SYSTOLIC BLOOD PRESSURE: 134 MMHG

## 2024-10-21 DIAGNOSIS — F10.10 ALCOHOL ABUSE, UNCOMPLICATED: ICD-10-CM

## 2024-10-21 DIAGNOSIS — I21.3 ST ELEVATION (STEMI) MYOCARDIAL INFARCTION OF UNSPECIFIED SITE: ICD-10-CM

## 2024-10-21 DIAGNOSIS — E78.5 HYPERLIPIDEMIA, UNSPECIFIED: ICD-10-CM

## 2024-10-21 DIAGNOSIS — I10 ESSENTIAL (PRIMARY) HYPERTENSION: ICD-10-CM

## 2024-10-21 DIAGNOSIS — E11.9 TYPE 2 DIABETES MELLITUS W/OUT COMPLICATIONS: ICD-10-CM

## 2024-10-21 DIAGNOSIS — R07.9 CHEST PAIN, UNSPECIFIED: ICD-10-CM

## 2024-10-21 PROCEDURE — G2211 COMPLEX E/M VISIT ADD ON: CPT | Mod: NC

## 2024-10-21 PROCEDURE — G0404: CPT

## 2024-10-21 PROCEDURE — 99213 OFFICE O/P EST LOW 20 MIN: CPT

## 2024-10-21 PROCEDURE — 93000 ELECTROCARDIOGRAM COMPLETE: CPT

## 2024-11-08 ENCOUNTER — RX RENEWAL (OUTPATIENT)
Age: 59
End: 2024-11-08

## 2024-12-20 ENCOUNTER — APPOINTMENT (OUTPATIENT)
Dept: INTERNAL MEDICINE | Facility: CLINIC | Age: 59
End: 2024-12-20
Payer: MEDICAID

## 2024-12-20 VITALS
HEART RATE: 69 BPM | OXYGEN SATURATION: 98 % | DIASTOLIC BLOOD PRESSURE: 86 MMHG | BODY MASS INDEX: 24.33 KG/M2 | HEIGHT: 67 IN | WEIGHT: 155 LBS | SYSTOLIC BLOOD PRESSURE: 124 MMHG

## 2024-12-20 VITALS — DIASTOLIC BLOOD PRESSURE: 80 MMHG | SYSTOLIC BLOOD PRESSURE: 130 MMHG

## 2024-12-20 DIAGNOSIS — I21.3 ST ELEVATION (STEMI) MYOCARDIAL INFARCTION OF UNSPECIFIED SITE: ICD-10-CM

## 2024-12-20 DIAGNOSIS — E11.9 TYPE 2 DIABETES MELLITUS W/OUT COMPLICATIONS: ICD-10-CM

## 2024-12-20 DIAGNOSIS — E78.5 HYPERLIPIDEMIA, UNSPECIFIED: ICD-10-CM

## 2024-12-20 DIAGNOSIS — Z12.11 ENCOUNTER FOR SCREENING FOR MALIGNANT NEOPLASM OF COLON: ICD-10-CM

## 2024-12-20 DIAGNOSIS — I10 ESSENTIAL (PRIMARY) HYPERTENSION: ICD-10-CM

## 2024-12-20 PROCEDURE — G2211 COMPLEX E/M VISIT ADD ON: CPT | Mod: NC

## 2024-12-20 PROCEDURE — 99214 OFFICE O/P EST MOD 30 MIN: CPT

## 2025-01-08 NOTE — DISCHARGE NOTE NURSING/CASE MANAGEMENT/SOCIAL WORK - NSDCPEFALRISK_GEN_ALL_CORE
For information on Fall & Injury Prevention, visit: [email or web address] OR  [email or web address] OR  https://www.cdc.gov/steadi/patient.html soft/tender...

## 2025-03-03 ENCOUNTER — APPOINTMENT (OUTPATIENT)
Dept: ENDOCRINOLOGY | Facility: CLINIC | Age: 60
End: 2025-03-03

## 2025-06-09 ENCOUNTER — APPOINTMENT (OUTPATIENT)
Dept: CARDIOLOGY | Facility: CLINIC | Age: 60
End: 2025-06-09

## 2025-06-10 ENCOUNTER — APPOINTMENT (OUTPATIENT)
Dept: INTERNAL MEDICINE | Facility: CLINIC | Age: 60
End: 2025-06-10

## (undated) DEVICE — ELCTR GROUNDING PAD ADULT COVIDIEN

## (undated) DEVICE — ELCTR BOVIE PENCIL BLADE 10FT

## (undated) DEVICE — SUT ETHILON 4-0 18" FS-2

## (undated) DEVICE — POSITIONER STRAP ARMBOARD VELCRO TS-30

## (undated) DEVICE — CANISTER DISPOSABLE THIN WALL 3000CC

## (undated) DEVICE — TOURNIQUET CUFF 18" DUAL PORT SINGLE BLADDER W PLC  (BLACK)

## (undated) DEVICE — PACK HAND TRAY

## (undated) DEVICE — SOL IRR POUR H2O 1500ML

## (undated) DEVICE — TOURNIQUET CUFF 24" DUAL PORT SINGLE BLADDER W PLC (BLACK)

## (undated) DEVICE — FOLEY HOLDER STATLOCK 2 WAY ADULT

## (undated) DEVICE — VENODYNE/SCD SLEEVE CALF MEDIUM

## (undated) DEVICE — DRAPE 3/4 SHEET 52X76"

## (undated) DEVICE — SOL IRR POUR NS 0.9% 1500ML

## (undated) DEVICE — GLV 7.5 PROTEXIS (WHITE)

## (undated) DEVICE — LABELS BLANK W PEN

## (undated) DEVICE — WARMING BLANKET FULL ADULT

## (undated) DEVICE — SLING SHOULDER IMMOBILIZER CLINIC MEDIUM